# Patient Record
Sex: FEMALE | Race: WHITE
[De-identification: names, ages, dates, MRNs, and addresses within clinical notes are randomized per-mention and may not be internally consistent; named-entity substitution may affect disease eponyms.]

---

## 2018-05-26 ENCOUNTER — HOSPITAL ENCOUNTER (OUTPATIENT)
Dept: HOSPITAL 75 - ER | Age: 53
Discharge: HOME | End: 2018-05-26
Attending: SURGERY
Payer: SELF-PAY

## 2018-05-26 VITALS — WEIGHT: 120 LBS | HEIGHT: 61 IN | BODY MASS INDEX: 22.66 KG/M2

## 2018-05-26 VITALS — SYSTOLIC BLOOD PRESSURE: 111 MMHG | DIASTOLIC BLOOD PRESSURE: 74 MMHG

## 2018-05-26 VITALS — DIASTOLIC BLOOD PRESSURE: 74 MMHG | SYSTOLIC BLOOD PRESSURE: 111 MMHG

## 2018-05-26 DIAGNOSIS — K81.0: Primary | ICD-10-CM

## 2018-05-26 DIAGNOSIS — E87.2: ICD-10-CM

## 2018-05-26 DIAGNOSIS — E11.65: ICD-10-CM

## 2018-05-26 DIAGNOSIS — R19.7: ICD-10-CM

## 2018-05-26 DIAGNOSIS — Z79.899: ICD-10-CM

## 2018-05-26 DIAGNOSIS — Z79.84: ICD-10-CM

## 2018-05-26 LAB
ALBUMIN SERPL-MCNC: 4.7 GM/DL (ref 3.2–4.5)
ALP SERPL-CCNC: 88 U/L (ref 40–136)
ALT SERPL-CCNC: 33 U/L (ref 0–55)
APTT PPP: YELLOW S
BACTERIA #/AREA URNS HPF: (no result) /HPF
BARBITURATES UR QL: NEGATIVE
BASOPHILS # BLD AUTO: 0 10^3/UL (ref 0–0.1)
BASOPHILS NFR BLD AUTO: 0 % (ref 0–10)
BASOPHILS NFR BLD MANUAL: 0 %
BENZODIAZ UR QL SCN: POSITIVE
BILIRUB SERPL-MCNC: 1.2 MG/DL (ref 0.1–1)
BILIRUB UR QL STRIP: NEGATIVE
BUN/CREAT SERPL: 20
BUN/CREAT SERPL: 20
CALCIUM SERPL-MCNC: 8.2 MG/DL (ref 8.5–10.1)
CALCIUM SERPL-MCNC: 9.8 MG/DL (ref 8.5–10.1)
CHLORIDE SERPL-SCNC: 102 MMOL/L (ref 98–107)
CHLORIDE SERPL-SCNC: 107 MMOL/L (ref 98–107)
CO2 SERPL-SCNC: 18 MMOL/L (ref 21–32)
CO2 SERPL-SCNC: 18 MMOL/L (ref 21–32)
COCAINE UR QL: NEGATIVE
CREAT SERPL-MCNC: 0.71 MG/DL (ref 0.6–1.3)
CREAT SERPL-MCNC: 0.82 MG/DL (ref 0.6–1.3)
EOSINOPHIL # BLD AUTO: 0 10^3/UL (ref 0–0.3)
EOSINOPHIL NFR BLD AUTO: 0 % (ref 0–10)
EOSINOPHIL NFR BLD MANUAL: 0 %
ERYTHROCYTE [DISTWIDTH] IN BLOOD BY AUTOMATED COUNT: 13.4 % (ref 10–14.5)
FIBRINOGEN PPP-MCNC: CLEAR MG/DL
GFR SERPLBLD BASED ON 1.73 SQ M-ARVRAT: > 60 ML/MIN
GFR SERPLBLD BASED ON 1.73 SQ M-ARVRAT: > 60 ML/MIN
GLUCOSE SERPL-MCNC: 275 MG/DL (ref 70–105)
GLUCOSE SERPL-MCNC: 325 MG/DL (ref 70–105)
GLUCOSE UR STRIP-MCNC: (no result) MG/DL
HCT VFR BLD CALC: 40 % (ref 35–52)
HGB BLD-MCNC: 14.2 G/DL (ref 11.5–16)
KETONES UR QL STRIP: (no result)
LEUKOCYTE ESTERASE UR QL STRIP: NEGATIVE
LIPASE SERPL-CCNC: 61 U/L (ref 8–78)
LYMPHOCYTES # BLD AUTO: 0.4 X 10^3 (ref 1–4)
LYMPHOCYTES NFR BLD AUTO: 7 % (ref 12–44)
MAGNESIUM SERPL-MCNC: 1.9 MG/DL (ref 1.8–2.4)
MANUAL DIFFERENTIAL PERFORMED BLD QL: YES
MCH RBC QN AUTO: 29 PG (ref 25–34)
MCHC RBC AUTO-ENTMCNC: 35 G/DL (ref 32–36)
MCV RBC AUTO: 82 FL (ref 80–99)
METHADONE UR QL SCN: NEGATIVE
METHAMPHETAMINE SCREEN URINE S: NEGATIVE
MONOCYTES # BLD AUTO: 0.2 X 10^3 (ref 0–1)
MONOCYTES NFR BLD AUTO: 3 % (ref 0–12)
MONOCYTES NFR BLD: 2 %
NEUTROPHILS # BLD AUTO: 4.6 X 10^3 (ref 1.8–7.8)
NEUTROPHILS NFR BLD AUTO: 90 % (ref 42–75)
NEUTS BAND NFR BLD MANUAL: 80 %
NEUTS BAND NFR BLD: 12 %
NITRITE UR QL STRIP: NEGATIVE
OPIATES UR QL SCN: NEGATIVE
OXYCODONE UR QL: NEGATIVE
PH UR STRIP: 6 [PH] (ref 5–9)
PLATELET # BLD: 131 10^3/UL (ref 130–400)
PMV BLD AUTO: 10.1 FL (ref 7.4–10.4)
POTASSIUM SERPL-SCNC: 3.7 MMOL/L (ref 3.6–5)
POTASSIUM SERPL-SCNC: 4 MMOL/L (ref 3.6–5)
PROPOXYPH UR QL: NEGATIVE
PROT SERPL-MCNC: 8.6 GM/DL (ref 6.4–8.2)
PROT UR QL STRIP: (no result)
RBC # BLD AUTO: 4.93 10^6/UL (ref 4.35–5.85)
RBC #/AREA URNS HPF: (no result) /HPF
RBC MORPH BLD: NORMAL
SODIUM SERPL-SCNC: 136 MMOL/L (ref 135–145)
SODIUM SERPL-SCNC: 138 MMOL/L (ref 135–145)
SP GR UR STRIP: 1.01 (ref 1.02–1.02)
SQUAMOUS #/AREA URNS HPF: (no result) /HPF
TRICYCLICS UR QL SCN: NEGATIVE
TRIGL SERPL-MCNC: 315 MG/DL (ref ?–150)
UROBILINOGEN UR-MCNC: NORMAL MG/DL
VARIANT LYMPHS NFR BLD MANUAL: 6 %
WBC # BLD AUTO: 5.1 10^3/UL (ref 4.3–11)
WBC #/AREA URNS HPF: (no result) /HPF

## 2018-05-26 PROCEDURE — 96375 TX/PRO/DX INJ NEW DRUG ADDON: CPT

## 2018-05-26 PROCEDURE — 86850 RBC ANTIBODY SCREEN: CPT

## 2018-05-26 PROCEDURE — 85007 BL SMEAR W/DIFF WBC COUNT: CPT

## 2018-05-26 PROCEDURE — 87081 CULTURE SCREEN ONLY: CPT

## 2018-05-26 PROCEDURE — 80048 BASIC METABOLIC PNL TOTAL CA: CPT

## 2018-05-26 PROCEDURE — 84478 ASSAY OF TRIGLYCERIDES: CPT

## 2018-05-26 PROCEDURE — 83690 ASSAY OF LIPASE: CPT

## 2018-05-26 PROCEDURE — 85027 COMPLETE CBC AUTOMATED: CPT

## 2018-05-26 PROCEDURE — 96361 HYDRATE IV INFUSION ADD-ON: CPT

## 2018-05-26 PROCEDURE — 86920 COMPATIBILITY TEST SPIN: CPT

## 2018-05-26 PROCEDURE — 84703 CHORIONIC GONADOTROPIN ASSAY: CPT

## 2018-05-26 PROCEDURE — 88304 TISSUE EXAM BY PATHOLOGIST: CPT

## 2018-05-26 PROCEDURE — 36415 COLL VENOUS BLD VENIPUNCTURE: CPT

## 2018-05-26 PROCEDURE — 83735 ASSAY OF MAGNESIUM: CPT

## 2018-05-26 PROCEDURE — 82962 GLUCOSE BLOOD TEST: CPT

## 2018-05-26 PROCEDURE — 76705 ECHO EXAM OF ABDOMEN: CPT

## 2018-05-26 PROCEDURE — 80306 DRUG TEST PRSMV INSTRMNT: CPT

## 2018-05-26 PROCEDURE — 74019 RADEX ABDOMEN 2 VIEWS: CPT

## 2018-05-26 PROCEDURE — 86901 BLOOD TYPING SEROLOGIC RH(D): CPT

## 2018-05-26 PROCEDURE — 86900 BLOOD TYPING SEROLOGIC ABO: CPT

## 2018-05-26 PROCEDURE — 96376 TX/PRO/DX INJ SAME DRUG ADON: CPT

## 2018-05-26 PROCEDURE — 96374 THER/PROPH/DIAG INJ IV PUSH: CPT

## 2018-05-26 PROCEDURE — 86141 C-REACTIVE PROTEIN HS: CPT

## 2018-05-26 PROCEDURE — 80053 COMPREHEN METABOLIC PANEL: CPT

## 2018-05-26 PROCEDURE — 81000 URINALYSIS NONAUTO W/SCOPE: CPT

## 2018-05-26 RX ADMIN — SODIUM CHLORIDE SCH MLS/HR: 900 INJECTION, SOLUTION INTRAVENOUS at 05:39

## 2018-05-26 RX ADMIN — SODIUM CHLORIDE SCH MLS/HR: 900 INJECTION, SOLUTION INTRAVENOUS at 04:38

## 2018-05-26 RX ADMIN — MORPHINE SULFATE PRN MG: 10 INJECTION, SOLUTION INTRAMUSCULAR; INTRAVENOUS at 12:40

## 2018-05-26 RX ADMIN — SODIUM CHLORIDE, SODIUM LACTATE, POTASSIUM CHLORIDE, AND CALCIUM CHLORIDE PRN MLS/HR: 600; 310; 30; 20 INJECTION, SOLUTION INTRAVENOUS at 11:35

## 2018-05-26 RX ADMIN — SODIUM CHLORIDE, SODIUM LACTATE, POTASSIUM CHLORIDE, AND CALCIUM CHLORIDE PRN MLS/HR: 600; 310; 30; 20 INJECTION, SOLUTION INTRAVENOUS at 10:43

## 2018-05-26 RX ADMIN — MORPHINE SULFATE PRN MG: 10 INJECTION, SOLUTION INTRAMUSCULAR; INTRAVENOUS at 12:45

## 2018-05-26 NOTE — PROGRESS NOTE-POST OPERATIVE
Post-Operative Progess Note


Surgeon (s)/Assistant (s)


Surgeon


REYNOLD MCNALLY DO


Assistant:  none





Pre-Operative Diagnosis


Acute Rodríguez without stones, Hyperglycemia, Intractable nausea and vomiting





Post-Operative Diagnosis





Same





Procedure & Operative Findings


Date of Procedure


5/26/18


Procedure Performed/Findings


Lap rodríguez with IOC


Anesthesia Type


GET





Estimated Blood Loss


Estimated blood loss (mL):  150ml





Specimens/Packing


Specimens Removed


GB and contents











REYNOLD MCNALLY DO May 26, 2018 12:13

## 2018-05-26 NOTE — DIAGNOSTIC IMAGING REPORT
PROCEDURE: US Gallbladder.



TECHNIQUE: Multiple real-time grayscale images were obtained over

the right upper quadrant in various projections.



INDICATION: Abdominal pain, pancreatitis.



COMPARISON: There are no prior studies available for comparison.



FINDINGS:  

There is no evidence for cholelithiasis but the gallbladder wall

is markedly thickened and irregular. The wall measures

approximately 10 MM (normal 3 mm or less).  



There may be a trace amount of pericholecystic fluid present, as

well. These findings do suggest acute cholecystitis. If further

imaging is desired, then a nuclear medicine hepatobiliary scan

would be recommended.



The common bile duct was not visualized due to bowel gas. The

pancreas was also obscured by bowel gas.



The liver does not appear enlarged. There is no focal mass

involving the liver and the biliary tree is not abnormally

distended. 



The right kidney is unremarkable. 



The aorta was not well visualized. 



IMPRESSION:

1. The gallbladder wall is thickened and has a lobulated

appearance. There is also a trace amount of pericholecystic fluid

present. There is no evidence for cholelithiasis but the

appearance of the gallbladder does suggest acute cholecystitis.

Recommendations as above.



2. There is no acute abnormality of the right upper quadrant

noted otherwise.



Dictated by: 



  Dictated on workstation # [id number]

## 2018-05-26 NOTE — CONSULTATION
History of Present Illness


History of Present Illness


Patient Consulted On(lawanda/time)


18


 09:32


Time Seen by Provider:  09:06


History of Present Illness


Surgery asked to consult regarding Acute Cholecystitis.





HPI per ED:  The patient presents to the ER by private conveyance with a chief 

complaint she is having epigastric abdominal pain consistent with her history 

of pancreatitis. Her epigastric pain radiates to her back. It's sharp, 

constant. She has poorly controlled diabetes. She does not take her blood 

sugars anymore because stresses are out. She had an A1c about for 5 months ago 

of 10. She has a history of hypertriglyceridemia above 1200 several years ago 

when she had a pancreatitis attack. She does not take fish oil her statins due 

to adverse effects. She denies alcohol intake. She does not use insulin for her 

diabetes just Amaryl, metformin. She says she's been taking her medications as 

prescribed. She denies any trauma, dysuria, chills but she thinks she might of 

been having some fever the last several days. The pain started last night about 

2130 after eating dinner. She says the last time she had pancreatitis she had 

to be put in the hospital for 8 days on Dilaudid.








When I saw pt she still was having nausea, minimal abdominal pain (maybe 2-3 

out of 10) with no real radiation of the pain.  Pt does not remember why she 

had pancreatitis in , denies gallstones or alcohol intake.  Nothing is 

helping the vomiting.





Allergies and Home Medications


Allergies


Coded Allergies:  


     sulfamethoxazole (Verified  Allergy, Intermediate, HIVES, 16)


     trimethoprim (Verified  Allergy, Intermediate, HIVES, 16)





Home Medications


Prednisone 10 Mg Tab, 10 MG PO BID


   Prescribed by: JOSE ALFARO on 16 0149





Patient Home Medication List


Home Medication List Reviewed:  Yes





Past Medical-Social-Family Hx


Patient Social History


Alcohol Use:  Denies Use


Recreational Drug Use:  No


Smoking Status:  Never a Smoker


2nd Hand Smoke Exposure:  No


Recent Foreign Travel:  No


Contact w/Someone Who Travel:  No


Recent Infectious Disease Expo:  No


Recent Hopitalizations:  No





Seasonal Allergies


Seasonal Allergies:  No





Surgeries


History of Surgeries:  Yes


Surgeries:  Appendectomy





Respiratory


History of Respiratory Disorde:  No





Cardiovascular


History of Cardiac Disorders:  No





Neurological


History of Neurological Disord:  No





Reproductive System


Pregnant:  No


Hx Reproductive Disorders:  No





Genitourinary


History of Genitourinary Disor:  No





Gastrointestinal


History of Gastrointestinal Di:  Yes


Gastrointestinal Disorders:  Pancreatitis





Musculoskeletal


History of Musculoskeletal Dis:  No





Endocrine


History of Endocrine Disorders:  Yes


Endocrine Disorders:  Diabetes, Non-Insulin dep





HEENT


History of HEENT Disorders:  No





Cancer


History of Cancer:  No





Psychosocial


History of Psychiatric Problem:  Yes


Behavioral Health Disorders:  Anxiety





Integumentary


History of Skin or Integumenta:  No





Blood Transfusions


History of Blood Disorders:  No





Family Medical History


Significant Family History:  Cancer (Grandfather  of stomach CA), Diabetes (

Mother, Father and Siblings), Hypertension (Parents and sister)





Review of Systems-General


Constitutional:  No chills, No diaphoresis; dizziness, malaise, weakness


EENTM:  No blurred vision, No double vision, No mouth pain, No mouth swelling, 

No nose congestion, No throat swelling


Respiratory:  No cough, No dyspnea on exertion, No hemoptysis


Cardiovascular:  No chest pain, No edema, No palpitations


Gastrointestinal:  abdominal pain; No jaundice; nausea, vomiting


Genitourinary:  No dysuria, No frequency, No hematuria


Musculoskeletal:  No back pain, No joint pain, No joint swelling, No muscle 

stiffness


Skin:  No change in color, No change in hair/nails, No rash


Psychiatric/Neurological:  Anxiety, Depressed; Denies Headache, Denies Seizure, 

Denies Tingling


Other


pt denies any abnormal bleeding or bruising.





Physical Exam-General Problems


Physical Exam


Vital Signs





Vital Signs - First Documented








 18





 04:20


 


Temp 97.7


 


Pulse 117


 


Resp 20


 


B/P (MAP) 149/100 (116)


 


Pulse Ox 98


 


O2 Delivery Room Air





Capillary Refill : Less Than 3 Seconds


General Appearance:  WD/WN, moderate distress


Eyes:  Bilateral Eye PERRL, Bilateral Eye EOMI


HEENT:  pharynx normal; No scleral icterus (R), No scleral icterus (L), No pale 

conjunctivae (R), No pale conjunctivae (L)


Neck:  non-tender, full range of motion, supple, normal inspection


Respiratory:  chest non-tender, lungs clear, normal breath sounds, no 

respiratory distress, no accessory muscle use


Cardiovascular:  regular rate, rhythm, no edema, systolic murmur


Gastrointestinal:  soft, no organomegaly, no pulsatile mass, tenderness (

epigastric and RUQ)


Rectal:  deferred


Back:  no CVA tenderness, no vertebral tenderness


Extremities:  normal range of motion, non-tender, normal inspection, no pedal 

edema, no calf tenderness


Neurologic/Psychiatric:  CNs II-XII nml as tested, no motor/sensory deficits, 

alert, normal mood/affect, oriented x 3


Skin:  normal color, warm/dry


Lymphatic:  no adenopathy (neck, axilla or groin)





Data Review


Labs


Laboratory Tests


18 04:30: 


White Blood Count 5.1, Red Blood Count 4.93, Hemoglobin 14.2, Hematocrit 40, 

Mean Corpuscular Volume 82, Mean Corpuscular Hemoglobin 29, Mean Corpuscular 

Hemoglobin Concent 35, Red Cell Distribution Width 13.4, Platelet Count 131, 

Mean Platelet Volume 10.1, Neutrophils (%) (Auto) 90H, Lymphocytes (%) (Auto) 7L

, Monocytes (%) (Auto) 3, Eosinophils (%) (Auto) 0, Basophils (%) (Auto) 0, 

Neutrophils # (Auto) 4.6, Lymphocytes # (Auto) 0.4L, Monocytes # (Auto) 0.2, 

Eosinophils # (Auto) 0.0, Basophils # (Auto) 0.0, Neutrophils % (Manual) 80, 

Lymphocytes % (Manual) 6, Monocytes % (Manual) 2, Eosinophils % (Manual) 0, 

Basophils % (Manual) 0, Band Neutrophils 12, Blood Morphology Comment NORMAL, 

Sodium Level 136, Potassium Level 4.0, Chloride Level 102, Carbon Dioxide Level 

18L, Anion Gap 16H, Blood Urea Nitrogen 16, Creatinine 0.82, Estimat Glomerular 

Filtration Rate > 60, BUN/Creatinine Ratio 20, Glucose Level 325H, Calcium 

Level 9.8, Magnesium Level 1.9, Total Bilirubin 1.2H, Aspartate Amino Transf (

AST/SGOT) 25, Alanine Aminotransferase (ALT/SGPT) 33, Alkaline Phosphatase 88, C

-Reactive Protein High Sensitivity 1.45H, Total Protein 8.6H, Albumin 4.7H, 

Triglycerides Level 315H, Lipase 61


18 04:34: Glucometer 308H


18 05:10: 


Urine Color YELLOW, Urine Clarity CLEAR, Urine pH 6, Urine Specific Gravity 

1.010L, Urine Protein 1+H, Urine Glucose (UA) 4+H, Urine Ketones 3+H, Urine 

Nitrite NEGATIVE, Urine Bilirubin NEGATIVE, Urine Urobilinogen NORMAL, Urine 

Leukocyte Esterase NEGATIVE, Urine RBC (Auto) NEGATIVE, Urine RBC RARE, Urine 

WBC NONE, Urine Squamous Epithelial Cells 2-5, Urine Crystals NONE, Urine 

Bacteria TRACE, Urine Casts NONE, Urine Mucus NEGATIVE, Urine Culture Indicated 

NO, Urine Opiates Screen NEGATIVE, Urine Oxycodone Screen NEGATIVE, Urine 

Methadone Screen NEGATIVE, Urine Propoxyphene Screen NEGATIVE, Urine 

Barbiturates Screen NEGATIVE, Ur Tricyclic Antidepressants Screen NEGATIVE, 

Urine Phencyclidine Screen NEGATIVE, Urine Amphetamines Screen NEGATIVE, Urine 

Methamphetamines Screen NEGATIVE, Urine Benzodiazepines Screen POSITIVEH, Urine 

Cocaine Screen NEGATIVE, Urine Cannabinoids Screen POSITIVEH


18 06:27: Glucometer 276H


18 06:38: 


Sodium Level 138, Potassium Level 3.7, Chloride Level 107, Carbon Dioxide Level 

18L, Anion Gap 13, Blood Urea Nitrogen 14, Creatinine 0.71, Estimat Glomerular 

Filtration Rate > 60, BUN/Creatinine Ratio 20, Glucose Level 275H, Calcium 

Level 8.2L





Assessment/Plan


Intractable Nausea and Vomiting


Acute Cholecystitis without Cholelithiasis


Hyperglycemia


DM II





Pt will be taken to the OR for laparoscopic cholecystectomy, possible 

cholangiogram, possible open.  She has been given IVF and her glucose is 

trending down.  US was 


read as acute cholecystitis; wall 1cm thick with pericholecystic fluid.  Pt WBC 

in normal.  Discussed risks and complications not limited to pain, bleeding, 

infection, scar


and damage to bowel or bile ducts with need for further procedure.  I also 

discussed the fact that she may not have resolution of her symptoms; however, 

the gallbladder


appears to be the most likely cause of her problems.  She does not appear to 

have pancreatitis at this time, although could develop after surgery.  All 

questions answered


to her and her 's satisfaction.











REYNOLD MCNALLY DO May 26, 2018 09:33

## 2018-05-26 NOTE — OPERATIVE REPORT
DATE OF SERVICE:  05/26/2018



PREOPERATIVE DIAGNOSES:

1.  Acute cholecystitis without stones.

2.  Hyperglycemia.

3.  Intractable nausea and vomiting.



POSTOPERATIVE DIAGNOSES:

1.  Acute cholecystitis without stones.

2.  Hyperglycemia.

3.  Intractable nausea and vomiting.



PROCEDURE:

Laparoscopic cholecystectomy, intraoperative cholangiogram.



SURGEON:

Adarsh Smith DO.



FIRST ASSIST:

None.



ANESTHESIA:

General endotracheal tube.



SPECIMEN:

Gallbladder and contents.



BLOOD LOSS:

Approximately 150 mL.



FLUIDS:

Per anesthesia.



POSTOPERATIVE CONDITION:

Stable.



INDICATION FOR PROCEDURE:

The patient is a 52-year-old female who came in with intractable nausea and

vomiting, found to have acute cholecystitis.  No stones seen diagnosed with

ultrasound, thickened gallbladder wall and pericholecystic fluid.



FINDINGS:

The patient had some pericholecystic fluid and edema, but there was some

thickened fat around the gallbladder and is very vascular had a lot of large

veins coming from the liver into the gallbladder and there was an intrahepatic

gallbladder.



PROCEDURE NOTE:

After informed consent was obtained, the patient was brought to the operating

room, placed on the operating table in supine position.  She was sterilely

prepped and draped in normal fashion.  Local lidocaine was used to infiltrate

the skin above the umbilicus.  Made incision with #11 blade, carried down to

skin into subcutaneous tissue, then deepened down to subcutaneous tissue by

electrocautery down to the fascia.  Fascia was then incised with electrocautery

and bluntly entered the abdomen, swept a finger around, placed 0 Vicryl

figure-of-eight suture, then placed 11 mm trocar port under direct

visualization.  Created pneumoperitoneum and placed 3 more ports in normal

fashion using local lidocaine, 11 blade for stab incision and the VersaStep

system, all done under direct visualization, one subxiphoid and 2 in the right

upper quadrant.  The patient then placed slightly reverse Trendelenburg and

rotated left.  Able to visualize the gallbladder looked thickened and slightly

edematous.  There is also very intrahepatic and there was lot of vasculature to

it.  Able to grasp the gallbladder at the fundus and taken in superior direction

and then pushed away the duodenum which was slightly adhered down to the distal

portion of the gallbladder with some blunt dissection also use a little bit of a

Bovie electrocautery.  Once this was pushed away then able to grasp down

Marvel's pouch and pulled in the inferolateral direction and start dissecting

out cystic duct and cystic artery.  I was able to get around the cystic duct and

placed 1 clip distally and then placed one distally two proximally on the cystic

artery.  Cut the cystic duct FDC through Metzenbaum scissors and shot a

cholangiogram.  Good spillage of dye down the cystic duct into the common bile

duct and down into the small intestine, appeared to be and possibly some stones

during this cholangiogram, but then they kind of went away, so I believe this

may have just been some air bubbles.  The contrast also went up into common

hepatic and then right and left hepatics did not delineate the pancreatic duct. 

Removed the cholangiogram catheter, placed 2 clips proximally on the cystic duct

then cut the cystic duct and cystic artery with Metzenbaum scissors.  Started

removing the gallbladder from bed of liver with L-hook cautery it was very

intrahepatic and there was lot of big vasculature veins to it, kept bleeding

because of vessels feeding the gallbladder even from the top.  I ran into a

couple large veins that bled a little bit controlled these with clips as well as

Bovie electrocautery.  It was very thickened tissue fat surrounding the

gallbladder.  This was very difficult to get down.  Finally, able to get the

gallbladder completely off the bed of liver placed a bag in the abdomen, placed

the gallbladder in the bag and then removed this through a supraumbilical

incision.  Placed the port back in the abdomen, copiously irrigated with normal

saline, suctioned this out.  Hemostasis was obtained in the bed of liver with

the Bovie electrocautery.  There was no bleeding at the end of the case. 

Pictures taken to confirm this suctioned out all the fluid looked around, no

other obvious pathology placed the patient supine and suctioned out the fluid

and then the pneumoperitoneum, removed all ports under direct visualization and

then closed the supraumbilical incision, closing the fascia with 0 Vicryl suture

previously placed.  Copiously irrigated all incisions with normal saline,

closing 3 small 5 mm incision with a single interrupted 4-0 undyed Monocryl

subcuticular stitch, closed supraumbilical incision with 3 interrupted 4-0

undyed Monocryl subcuticular stitches.  Area was cleaned and dried and Dermabond

placed as well as Band-Aids.  The patient then transferred to recovery room in

stable condition.  Sponge, instrument and needle count correct at the end of the

case.





Job ID: [id number]

DocumentID: [id number]

Dictated Date:  05/26/2018 15:21:37

Transcription Date: 05/26/2018 17:36:30

Dictated By: ADARSH SMITH DO

## 2018-05-26 NOTE — ANESTHESIA-GENERAL POST-OP
General


Patient Condition


Mental Status/LOC:  Same as Preop


Cardiovascular:  Satisfactory


Nausea/Vomiting:  Absent


Respiratory:  Satisfactory


Pain:  Controlled


Complications:  Absent





Post Op Complications


Complications


None





Follow Up Care/Instructions


Patient Instructions


None needed.





Anesthesia/Patient Condition


Patient Condition


Patient is doing well, no complaints, stable vital signs, no apparent adverse 

anesthesia problems.   


No complications reported per nursing.











FRIEDA RAMAN CRNA May 26, 2018 12:32

## 2018-05-26 NOTE — DIAGNOSTIC IMAGING REPORT
EXAM: FLUOROSCOPY



INDICATION: GB REMOVAL IN OR



COMPARISON: Gallbladder ultrasound 05/26/2018.



FINDINGS: 74 images were acquired during removal of the

gallbladder and intraoperative cholangiogram. Common bile duct is

normal in caliber with no filling defects identified. Contrast

reflux into the intrahepatic biliary ducts which appear

unremarkable.



IMPRESSION: Normal intraoperative cholangiogram after

cholecystectomy.



Fluoroscopy time 13.2 seconds. 7.74 mGy.



Dictated by: 



  Dictated on workstation # [id number]

## 2018-05-26 NOTE — DISCHARGE INST-SURGICAL
Discharge Inst-Surgical


Depart Medication/Instructions


New, Converted or Re-Newed RX:  RX Given to Pt/Family


Patient Instructions


Follow up Appt:


Make appointment for 1-2 weeks.





Instructions:


No lifting greater than 10 pounds.


No strenuous activity. 


May shower in 24 hours, no tub bath or soaking.


Use incentive spirometer at home as directed.


No Smoking





Skin/Wound Care:


You need to leave the Dermabond on over incision it will fall off on its own. 





Symptoms to Report:


Appetite Changes, Extremity Discoloration, Numbness/Tingling, Swelling Increased

, Bleeding Excessive, Eyesight Changes, Pain Increased, Urine Color Change, 

Constipation(Persistent), Fever over 101 degree F, Pain/Pressure in chest, 

Urinating Difficulty, Cough Up/Vomit Blood, Heart Beat Irreg/Pounding, Pain/

Pressure in jaw, Vaginal Bleeding Increase, Cramps in feet or legs, 

Lightheadedness, Pain/Pressure in shoulder, Diarrhea(Persistent), Memory 

Changes Suddenly, Questions/Concerns, Weight gain consecutive days, Dizziness/

Fainting, Nausea/Vomiting, Shortness of Breath, Weight gain over 2 pounds.





If eyes or skin turn yellow notify physician.








If questions or concerns contact your physician 


Or seek help at emergency department.





Activity


Activity Instructions:  Avoid Pulling & Pushing, Avoid Stress to Incision


Driving Instructions:  No Driving/Refer to 





Diet


Discharge Diet:  Avoid Fatty Foods, Low Fat/Low Cholesterol


Diet After 24 Hours:  Clear Liquid if Nauseous


If Any Problems/Questions/Issu:  Contact Your Physician, Go to Emergency Room, 

Go to Quick Care





Skin/Wound Care


Infection Signs and Symptoms:  Increased Redness, Foul Odor of Wound, Increased 

Drainage, Skin Itchy or Has a Rash, Increased Swelling, Temperature Above 101  F


Wound Care Comment:  


heating pad to neck and shoulder tonight for pain


Bathing Instructions:  Shower


Stitches/Staples/Dermabond Dis:  Dermabond


Ice Pack:  Ice On and Off Site











REYNOLD MCNALLY DO May 26, 2018 12:16

## 2019-01-30 ENCOUNTER — HOSPITAL ENCOUNTER (EMERGENCY)
Dept: HOSPITAL 75 - ER | Age: 54
Discharge: HOME | End: 2019-01-30
Payer: SELF-PAY

## 2019-01-30 VITALS — WEIGHT: 135 LBS | HEIGHT: 62 IN | BODY MASS INDEX: 24.84 KG/M2

## 2019-01-30 VITALS — DIASTOLIC BLOOD PRESSURE: 83 MMHG | SYSTOLIC BLOOD PRESSURE: 118 MMHG

## 2019-01-30 DIAGNOSIS — Z87.19: ICD-10-CM

## 2019-01-30 DIAGNOSIS — Z88.2: ICD-10-CM

## 2019-01-30 DIAGNOSIS — Z79.84: ICD-10-CM

## 2019-01-30 DIAGNOSIS — Z82.49: ICD-10-CM

## 2019-01-30 DIAGNOSIS — E11.9: ICD-10-CM

## 2019-01-30 DIAGNOSIS — F12.10: ICD-10-CM

## 2019-01-30 DIAGNOSIS — F41.9: ICD-10-CM

## 2019-01-30 DIAGNOSIS — Z90.49: ICD-10-CM

## 2019-01-30 DIAGNOSIS — Z79.52: ICD-10-CM

## 2019-01-30 DIAGNOSIS — N39.0: Primary | ICD-10-CM

## 2019-01-30 DIAGNOSIS — Z88.8: ICD-10-CM

## 2019-01-30 LAB
ALBUMIN SERPL-MCNC: 5.1 GM/DL (ref 3.2–4.5)
ALP SERPL-CCNC: 100 U/L (ref 40–136)
ALT SERPL-CCNC: 31 U/L (ref 0–55)
APTT PPP: YELLOW S
BACTERIA #/AREA URNS HPF: (no result) /HPF
BARBITURATES UR QL: NEGATIVE
BASOPHILS # BLD AUTO: 0 10^3/UL (ref 0–0.1)
BASOPHILS NFR BLD AUTO: 0 % (ref 0–10)
BASOPHILS NFR BLD MANUAL: 1 %
BENZODIAZ UR QL SCN: NEGATIVE
BILIRUB SERPL-MCNC: 0.8 MG/DL (ref 0.1–1)
BILIRUB UR QL STRIP: NEGATIVE
BUN/CREAT SERPL: 24
CALCIUM SERPL-MCNC: 9.8 MG/DL (ref 8.5–10.1)
CHLORIDE SERPL-SCNC: 99 MMOL/L (ref 98–107)
CO2 SERPL-SCNC: 21 MMOL/L (ref 21–32)
COCAINE UR QL: NEGATIVE
CREAT SERPL-MCNC: 0.85 MG/DL (ref 0.6–1.3)
EOSINOPHIL # BLD AUTO: 0 10^3/UL (ref 0–0.3)
EOSINOPHIL NFR BLD AUTO: 0 % (ref 0–10)
EOSINOPHIL NFR BLD MANUAL: 0 %
ERYTHROCYTE [DISTWIDTH] IN BLOOD BY AUTOMATED COUNT: 13.7 % (ref 10–14.5)
FIBRINOGEN PPP-MCNC: CLEAR MG/DL
GFR SERPLBLD BASED ON 1.73 SQ M-ARVRAT: > 60 ML/MIN
GLUCOSE SERPL-MCNC: 226 MG/DL (ref 70–105)
GLUCOSE UR STRIP-MCNC: (no result) MG/DL
HCT VFR BLD CALC: 41 % (ref 35–52)
HGB BLD-MCNC: 13.9 G/DL (ref 11.5–16)
KETONES UR QL STRIP: (no result)
LEUKOCYTE ESTERASE UR QL STRIP: (no result)
LIPASE SERPL-CCNC: 38 U/L (ref 8–78)
LYMPHOCYTES # BLD AUTO: 0.2 X 10^3 (ref 1–4)
LYMPHOCYTES NFR BLD AUTO: 3 % (ref 12–44)
MANUAL DIFFERENTIAL PERFORMED BLD QL: YES
MCH RBC QN AUTO: 28 PG (ref 25–34)
MCHC RBC AUTO-ENTMCNC: 34 G/DL (ref 32–36)
MCV RBC AUTO: 83 FL (ref 80–99)
METHADONE UR QL SCN: NEGATIVE
METHAMPHETAMINE SCREEN URINE S: NEGATIVE
MONOCYTES # BLD AUTO: 0.2 X 10^3 (ref 0–1)
MONOCYTES NFR BLD AUTO: 3 % (ref 0–12)
MONOCYTES NFR BLD: 0 %
NEUTROPHILS # BLD AUTO: 6 X 10^3 (ref 1.8–7.8)
NEUTROPHILS NFR BLD AUTO: 94 % (ref 42–75)
NEUTS BAND NFR BLD MANUAL: 78 %
NEUTS BAND NFR BLD: 17 %
NITRITE UR QL STRIP: NEGATIVE
OPIATES UR QL SCN: NEGATIVE
OXYCODONE UR QL: NEGATIVE
PH UR STRIP: 5 [PH] (ref 5–9)
PLATELET # BLD: 115 10^3/UL (ref 130–400)
PMV BLD AUTO: 10.2 FL (ref 7.4–10.4)
POTASSIUM SERPL-SCNC: 3.6 MMOL/L (ref 3.6–5)
PROPOXYPH UR QL: NEGATIVE
PROT SERPL-MCNC: 9.2 GM/DL (ref 6.4–8.2)
PROT UR QL STRIP: (no result)
RBC #/AREA URNS HPF: (no result) /HPF
RBC MORPH BLD: NORMAL
SODIUM SERPL-SCNC: 135 MMOL/L (ref 135–145)
SP GR UR STRIP: 1.02 (ref 1.02–1.02)
TRICYCLICS UR QL SCN: NEGATIVE
UROBILINOGEN UR-MCNC: NORMAL MG/DL
VARIANT LYMPHS NFR BLD MANUAL: 4 %
WBC # BLD AUTO: 6.4 10^3/UL (ref 4.3–11)
WBC #/AREA URNS HPF: (no result) /HPF

## 2019-01-30 PROCEDURE — 36415 COLL VENOUS BLD VENIPUNCTURE: CPT

## 2019-01-30 PROCEDURE — 87088 URINE BACTERIA CULTURE: CPT

## 2019-01-30 PROCEDURE — 82010 KETONE BODYS QUAN: CPT

## 2019-01-30 PROCEDURE — 82962 GLUCOSE BLOOD TEST: CPT

## 2019-01-30 PROCEDURE — 81000 URINALYSIS NONAUTO W/SCOPE: CPT

## 2019-01-30 PROCEDURE — 80053 COMPREHEN METABOLIC PANEL: CPT

## 2019-01-30 PROCEDURE — 80306 DRUG TEST PRSMV INSTRMNT: CPT

## 2019-01-30 PROCEDURE — 74177 CT ABD & PELVIS W/CONTRAST: CPT

## 2019-01-30 PROCEDURE — 83690 ASSAY OF LIPASE: CPT

## 2019-01-30 PROCEDURE — 85007 BL SMEAR W/DIFF WBC COUNT: CPT

## 2019-01-30 PROCEDURE — 85027 COMPLETE CBC AUTOMATED: CPT

## 2019-01-30 PROCEDURE — 87077 CULTURE AEROBIC IDENTIFY: CPT

## 2019-01-30 NOTE — XMS REPORT
[facility]

 Created on: 2018



GalvezLibra levy

External Reference #: [id number]

: 1965

Sex: Female



Demographics







 Address  [address]  [address]

 

 Preferred Language  Unknown

 

 Marital Status  Unknown

 

 Islam Affiliation  Unknown

 

 Race  Unknown

 

 Ethnic Group  Unknown





Author







 Author  ANIKET MAYFIELD

 

 [facility]

 

 Address  [address] [place]  [address]



 

 Phone  [phone]







Care Team Providers







 Care Team Member Name  Role  Phone

 

 ANIKET MAYFIELD  Unavailable  [phone]







PROBLEMS







 Type  Condition  ICD9-CM Code  JBH94-OU Code  Onset Dates  Condition Status  
SNOMED Code

 

 Problem  Hypertriglyceridemia     E78.1     Active  988942948

 

 Problem  Status post cholecystectomy     Z90.49     Active  730383484

 

 Problem  Anxiety disorder, unspecified     F41.9     Active  804329731

 

 Problem  Acquired hypothyroidism     E03.9     Active  898129725

 

 Problem  Diabetes     E11.9     Active  [id number]







ALLERGIES







 Substance  Reaction  Event Type  Date  Status

 

 Sulfamethoxazole-Trimethoprim  anaphylaxis  Drug Allergy  10 Andrei, 2018  Active







ENCOUNTERS







 Encounter  Location  Date  Diagnosis

 

 [place] [id number] [place] [address] 41477-
9805     

 

 [facility]  [id number] [place] [address] 02279-
3117     

 

 [place] [id number] [place] [address] 11514-
1583    Ketoacidosis E87.2 ; Status post cholecystectomy Z90.49 ; 
Diabetes E11.9 ; Acquired hypothyroidism E03.9 ; Hypertriglyceridemia E78.1 and 
Vaginal yeast infection B37.3

 

 [facility]  [id number] [place] [address] 74275-
5215  15 May, 2018   

 

 [facility]  [id number] [place] [address] 94482-
1953     

 

 [facility]  [id number] [place] [address] 72453-
3220  20 Mar, 2018   

 

 [facility]  [id number] [place] [address] 07418-
7025     

 

 [place] [id number] [place] [address] 13419-
4581     

 

 [facility]  [id number] [place] [address] 18820-
8853  10 Andrei, 2018  Diabetes E11.9 and Drug-induced acute pancreatitis with 
uninfected necrosis K85.31

 

 [facility]  [id number] [place] [address] 65728-
0510  27 Dec, 2017   

 

 [facility]  [address] [address]-
0581     

 

 [facility]  [id number] [place] [address] [phone]
[phone]  10 Nov, 2017  Crushing injury of right foot, initial encounter S97.81XA

 

 [facility]  [id number] [place] [address] 64604-
2923  27 Sep, 2017   

 

 [facility]  [address] [address]-
5116  21 Sep, 2017   

 

 [facility]  [id number] [place] [address] [phone]
[phone]  19 Sep, 2017  Acute non-recurrent pansinusitis J01.40

 

 [facility]  [id number] [place] [address] 05275-
6834  19 Sep, 2017   

 

 [facility]  [id number] [place] [address] 77901-
7437  04 Aug, 2017   

 

 [facility]  [id number] [place] [address] 21892-
4947     

 

 [facility]  [id number] [place] [address] 86997-
6204  26 May, 2017   

 

 [facility]  [id number] [place] [address] 20854-
8469  08 May, 2017  Diabetes E11.9 ; Anxiety disorder, unspecified F41.9 and 
Acquired hypothyroidism E03.9

 

 [facility]  [id number] [place] [address] 52667-
3916  01 May, 2017   

 

 [facility]  [address], [place] [address]-
4328     

 

 [facility]  [address] [place] [address] 20629-
3176     

 

 [facility]  [address] [place] [address] 50383-
9436  06 Mar, 2017   

 

 [facility]  [id number] [place] [address] 62354-
7918     

 

 [facility]  [address] [place] [address] 01480-
1198     

 

 [facility]  [id number] [place] [address] 45387-
4902     

 

 [facility]  [id number] [place] [address] 35202-
4098     

 

 [facility]  [id number] [place] [address] 74559-
8254    Acute non-recurrent maxillary sinusitis J01.00

 

 [facility]  [address] [place] [address] 65422-
1625     

 

 [facility]  [address] [address]753-
6944     

 

 [facility]  [id number] [place] [address] 45707-
2243  12 Dec, 2016   

 

 [facility]  [address] [place] [address] 46500-
4473  15 Nov, 2016   

 

 [facility]  [id number] [place] [address] 356239-
4731  12 Oct, 2016  Diabetes E11.9

 

 [facility]  [address], [place] [address]-
0579  28 Sep, 2016  Pelvic pain R10.2 ; Leukocytosis, unspecified D72.829 ; 
Constipation, unspecified constipation type K59.00 and History of pancreatitis 
Z87.19

 

 [facility]  [address], [place] [address]-
2126  22 Sep, 2016   

 

 [facility]  [address] [place] [address] 34892-
4166  14 Sep, 2016  Diabetes E11.9

 

 [facility]  [address] [place] [address] 21603
2546  13 Sep, 2016   

 

 [facility]  [id number] [place] [address] 17684
2541  09 Sep, 2016  Vaginal yeast infection B37.3

 

 [facility]  [id number] [place] [address] 99035-
0556  08 Sep, 2016   

 

 [facility]  [id number] [place] [address] 65732-
0116  30 Aug, 2016  Diabetes E11.9

 

 [facility]  [id number] [place] [address] 24617-
6185  25 Aug, 2016  Anxiety disorder, unspecified F41.9

 

 [facility]  [address] [place] [address] 37662-
5897  17 Aug, 2016  Vaginal yeast infection B37.3

 

 [facility]  [address], [place] [address]-
7852  17 Aug, 2016   

 

 [facility]  [address], [place] [address]-
7608    Anxiety disorder, unspecified F41.9

 

 [facility]  [address], [place] [address]-
1385    Vaginal yeast infection B37.3

 

 [facility]  [address], [place] [address]-
7016    Anxiety disorder, unspecified F41.9

 

 [facility]  [address], [place] [address]-
4806    Anxiety disorder, unspecified F41.9

 

 [facility]  [address], [place] [address]-
1700  06 May, 2016  Anxiety disorder, unspecified F41.9

 

 [facility]  [address] [address]-
2845  04 May, 2016  Diabetes E11.9

 

 [facility]  [address] [address]-
1385    Anxiety disorder, unspecified F41.9

 

 [facility]  [address], [place] [address]-
1872     

 

 [facility]  [id number] [place] [address] 78154-
6843  25 Mar, 2016  Vaginal yeast infection B37.3

 

 [facility]  [address], [place] [address]-
7368  15 Mar, 2016   

 

 [facility]  [id number] [place] [address] 19701-
5546     

 

 [facility]  [address] [place] [address] 48754-
8702     

 

 [facility]  [address], [place] [address]-
6901     

 

 [facility]  [address], [place] [address]-
6411  15 Andrei, 2016   

 

 [facility]  [address], [place] [address]-
4950  22 Dec, 2015   

 

 [facility]  [address], [place] [address]-
2938    Diabetes E11.9 ; Acquired hypothyroidism E03.9 ; 
Hyperlipidemia, unspecified hyperlipidemia E78.5 and Anxiety F41.9

 

 [facility]  [address], [place] [address]-
6137     

 

 [facility]  [address] [place] [address] 30911-
7089  27 Oct, 2015   

 

 [facility]  [address], [place] [address]
2542  29 Sep, 2015   

 

 [facility]  [address], [place] [address]-
2546  01 Sep, 2015   

 

 [facility]  [address], [place] [address]-
2546  04 Aug, 2015   

 

 [facility]  [id number] [place] [address] 90556-
2546  15 Jul, 2015   

 

 [facility]  [address], [place] [address]-
2254    DUB (dysfunctional uterine bleeding) 626.8 and Pap test, as 
part of routine gynecological examination V76.2

 

 [facility]  [address], [place] [address]-
1456     

 

 [facility]  [address] [place] [address] 01604-
2546     

 

 [facility]  [address], [place] [address]-
3643     

 

 [facility]  [address], [place] [address]-
7006     

 

 [facility]  [address], [place] [address]-
2986  15 Eagle, 2015  Diabetes 250.00

 

 [facility]  [address], [place] [address]-
2546     

 

 [facility]  [id number] [address]
2543  19 May, 2015   

 

 [facility]  [address] [place] [address] 88913
2546  13 May, 2015  Diabetes 250.00

 

 [facility]  [id number], [place] [address]-
2546  12 May, 2015   

 

 [facility]  [address], [place] [address]-
1546  07 May, 2015   

 

 [facility] [place] [facility]  [address] [id number] [place] [address]-
9885  07 May, 2015   

 

 [facility] [place] [facility]  [address] [id number] [place] [address]-
9637  05 May, 2015   

 

 [facility] [place] [facility]  [address] [id number] [place] [address]-
2868  01 May, 2015   

 

 [facility] [place] [facility]  [address] [id number] [place] [address]-
1405     

 

 [facility] [place] [facility]  [address] [id number] [place] [address]-
1073     

 

 [facility] [place] [facility]  [address] [id number] [place] [address]-
8946     

 

 [facility] [place] [facility]  [address] [id number] [place] [address]-
8055  17 Mar, 2015   

 

 [facility] [place] [facility]  [address] [id number] [place] [address]-
8212  17 Mar, 2015   

 

 [facility] [place] [facility]  [address] [id number] [place] [address]-
9035  17 Mar, 2015   

 

 [facility] [place] [facility]  [address] [id number] [place] [address]-
6814  17 Mar, 2015   

 

 [facility] [place] [facility]  [address] [id number] [place] [address]-
8219  09 Mar, 2015   

 

 [facility] [place] [facility]  [address] [id number] [place] [address]-
8897  09 Mar, 2015   

 

 [facility] [place] [facility]  [address] [id number] [place] [address]-
1371  06 Mar, 2015   

 

 [facility] [place] [facility]  [address] [id number] [place] [address]-
8135  03 Mar, 2015   

 

 [facility] [place] [facility]  [address] [id number] [place] [address]-
0728  03 Mar, 2015   

 

 [facility] [place] [facility]  [address] [id number] [place] [address]-
8141  ,    

 

 [facility] [place] [facility]  [address] [id number] [place] [address]-
2485  ,    

 

 [facility] [place] [facility]  [address] [id number] [place] [address]-
2546  ,    

 

 [facility] [place] [facility]  [address] [id number] [place] [address]-
5976  ,    

 

 [facility] [place] [facility]  [address] [id number] [place] [address]-
0823  ,    

 

 [facility] [place] [facility]  [address] [id number] [place] [address]-
7543  ,    

 

 [facility] [place] [facility]  [address] [id number] [place] [address]-
3887  ,    

 

 [facility] [place] [facility]  [address] [id number] [place] [address]-
7031     

 

 [facility] [place] [facility]  [address] [id number] [place] [address]-
8493     

 

 [facility] [place] [facility]  [address] [id number] [place] [address]-
7064     

 

 [facility] [place] [facility]  [address] [id number] [place] [address]-
1885     

 

 [facility] [place] [facility]  [address] [id number] [place] [address]-
5393     

 

 [facility] [place] [facility]  [address] [id number] [place] [address]-
5562     

 

 [facility] [place] [facility]  [address] [id number] [place] [address]-
2022     

 

 [facility] [place] [facility]  [address] [id number] [place] [address]-
6821     

 

 [facility] [place] [facility]  [address] [id number] [address]-
8234     

 

 [facility] [place] [facility]  [address] [id number] [place] [address]-
7261     

 

 [facility] [place] [facility]  [address] [id number] [place]-
4199     

 

 [facility] [place] [facility]  [address] [id number] [place] [address]-
3327     

 

 [facility] [place] [facility]  [address] [id number] [place] [address]-
3886     

 

 [facility] [place] [facility]  [address] [id number] [place] [address]-
6849  23 Dec, 2014   

 

 [facility] [place] [facility]  [address] [id number] [place] [address]-
1795  23 Dec, 2014   

 

 [facility] [place] [facility]  [address] [id number] [place] [address]-
6253  22 Dec, 2014   

 

 [facility] [place] [facility]  [address] [id number] [place] [address]-
6640  22 Dec, 2014   

 

 [facility] [place] [facility]  [address] [id number] [place] [address]-
7658  17 Dec, 2014   

 

 [facility] [place] [facility]  [address] [id number] [place] [address]-
0372  17 Dec, 2014   

 

 [facility] [place] [facility]  [address] [id number] [place] [address]-
2926  15 Dec, 2014   

 

 [facility] [place] [facility]  [address] [id number] [place] [address]-
2087  15 Dec, 2014   

 

 [facility] [place] [facility]  [address] [id number] [place] [address]-
0196  12 Dec, 2014   

 

 [facility] [place] [facility]  [address] [id number] [place] [address]-
9538  08 Dec, 2014   

 

 [facility] [place] [facility]  [address] [id number] [place] [address]-
2173  08 Dec, 2014   

 

 [facility] [place] [facility]  [address] [id number] [place] [address]-
7454  08 Dec, 2014   

 

 [facility] [place] [facility]  [address] [id number] [place] [address]-
0221  08 Dec, 2014   

 

 [facility] [place] [facility]  [address] [id number] [place] [address]-
5735     

 

 [facility] [place] [facility]  [address] [id number] [place] [address]-
5743     

 

 [facility] [place] [facility]  [address] [id number] [place] [address]-
1390     

 

 [facility] [place] [facility]  [address] [id number] [place] [address]-
7178     

 

 [facility] [place] [facility]  [address] [id number] [place] [address]-
3867  10 Nov, 2014   

 

 [facility] [place] [facility]  [address] [id number] [place] [address]-
7749  10 Nov, 2014   

 

 [facility] [place] [facility]  [address] [id number] [place] [address]-
2988  07 Oct, 2014   

 

 [facility] [place] [facility]  [address] [id number] [place] [address]-
1825  07 Oct, 2014   

 

 [facility] [place] [facility]  [address] [id number] [place] [address]-
0288  01 Oct, 2014   

 

 [facility] [place] [facility]  [address] [id number] [place] [address]-
6181  01 Oct, 2014   

 

 [facility] [place] [facility]  [address] [id number] [place] [address]-
9663  24 Sep, 2014   

 

 [facility] [place] [facility]  [address] [id number] [place] [address]-
4629  24 Sep, 2014   

 

 [facility] [place] [facility]  [address] [id number] [place] [address]-
6506  23 Sep, 2014   

 

 [facility] [place] [facility]  [address] [id number] [place] [address]-
8507  23 Sep, 2014   

 

 [facility] [place] [facility]  [address] [id number] [place] [address]-
5918  22 Sep, 2014   

 

 [facility] [place] [facility]  [address] [id number] [place] [address]-
3947  22 Sep, 2014   

 

 [facility] [place] [facility]  [address] [id number] [place] [address]-
8043  19 Aug, 2014   

 

 [facility] [place] [facility]  [address] [id number] [place] [address]-
7001  19 Aug, 2014   

 

 [facility] [place] [facility]  [address] [id number] [place] [address]-
0348     

 

 [facility] [place] [facility]  [address] [id number] [place] [address]-
9513     

 

 [facility] [place] [facility]  [address] [id number] [place] [address]-
6287  10 Eagle, 2014   

 

 [facility] [place] [facility]  [address] [id number] [place] [address]-
9821     

 

 [facility] [place] [facility]  [address] [id number] [place] [address]-
9765     

 

 [facility] [place] [facility]  [address] [id number] [place] [address]-
8548  07 May, 2014   

 

 [facility] [place] [facility]  [address] [id number] [place] [address]-
8282  07 May, 2014   

 

 [facility] [place] [facility]  [address] [id number] [place] [address]-
8027     

 

 [facility] [place] [facility]  [address] [id number] [place] [address]-
7242     

 

 [facility] [place] [facility]  [address] [id number] [place] [address]-
8504     

 

 [facility] [place] [facility]  [address] [id number] [place] [address]-
9986     

 

 [facility] [place] [facility]  [address] [id number] [place] [address]-
9036     

 

 [facility] [place] [facility]  [address] [id number] [place] [address]-
8803     

 

 [facility] [place] [facility]  [address] [id number] [place] [address]-
4319     

 

 [facility] [place] [facility]  [address] [id number] [place] [address]-
3596     

 

 [facility] [place] [facility]  [address] [id number] [place] [address]-
5485     

 

 [facility] [place] [facility]  [address] [id number] [place] [address]-
3405     

 

 [facility] [place] [facility]  [address] [id number] [place] [address]-
1928  17 Mar, 2014   

 

 [facility] [place] [facility]  [address] [id number] [place] [address]-
5697  17 Mar, 2014   

 

 [facility] [place] [facility]  [address] [id number] [place] [address]-
3724     

 

 [facility] [place] [facility]  [address] [id number] [place] [address]-
7381     

 

 [facility] [place] [facility]  [address] [id number] [place] [address]-
4859     

 

 [facility] [place] [facility]  [address] [id number] [place] [address]-
1204     

 

 [facility] [place] [facility]  [address] [id number] [place] [address]-
1287  25 Oct, 2013   

 

 [facility] [place] [facility]  [address] [id number] [place] [address]-
0323  25 Oct, 2013   

 

 [facility] [place] [facility]  [address] [id number] [place] [address]-
9964  23 Sep, 2013   

 

 [facility] [place] [facility]  [address] [id number] [place] [address]-
6749  06 Aug, 2013   

 

 [facility] [place] [facility]  [address] [id number] [place] [address]-
7563  05 Aug, 2013   

 

 [facility] [place] [facility]  [address] [id number] [place] [address]-
3582     

 

 [facility] [place] [facility]  [address] [id number] [place] [address]-
3806     

 

 [facility] [place] [facility]  [address] [id number] [place] [address]-
8944     

 

 [facility]  [address] [id number] [place] [address]-
8337  28 Dec, 2012   

 

 [facility] [place] [facility]  [address] [id number] [place] [address]-
9829  19 Dec, 2012   

 

 [facility]  [address] [id number] [place] [address]-
1670  19 Dec, 2012   

 

 [facility]  [address] [id number] [place] [address]-
4065  13 Dec, 2012   

 

 [facility] [place] [facility]  [address] [id number] [place] [address]-
0158  13 Dec, 2012   

 

 [facility] [place] [facility]  [address] [id number] [place] [address]-
3439     

 

 [facility] [place] [facility]  [address] [id number] [place] [address]-
3406     

 

 [facility] [place] [facility]  [address] [id number] [place] [address]-
6424  19 Oct, 2012   

 

 [facility] [place] [facility]  [address] [id number] [place] [address]-
7456  18 Oct, 2012   

 

 [facility] [place] [facility]  [address] [id number] [place] [address]-
1091  17 Oct, 2012   

 

 [facility] [place] [facility]  [address] [id number] [place] [address]-
9423  17 Oct, 2012   

 

 [facility] [place] [facility]  [address] [id number] [place] [address]-
3891  16 Oct, 2012   

 

 [facility] [place] [facility]  [address] [id number] [place] [address]-
1669  16 Oct, 2012   

 

 [facility] [place] [facility]  [address] [id number] [place] [address]-
1785  15 Oct, 2012   

 

 [facility] [place] [facility]  [address] [id number] [place] [address]-
7933  27 Sep, 2012   

 

 [facility] [place] [facility]  [address] [id number] [place] [address]-
2829  26 Sep, 2012   

 

 [facility] [place] [facility]  [address] [id number] [place] [address]-
4400     

 

 [facility] [place] [facility]  [address] [id number] [place] [address]-
5834     

 

 [facility] [place] [facility]  [address] [id number] [place] [address]-
2223     

 

 [facility] [place] [facility]  [address] [id number] [place] [address]-
0306     

 

 [facility] [place] [facility]  [address] [id number] [place] [address]-
9162     

 

 [facility] [place] [facility]  [address] [id number] [place] [address]-
0568     

 

 [facility] [place] [facility]  [address] [id number][place] [address]-
0709     

 

 [facility] [place] [facility]  [address] [id number], [place] [address]-
6741     

 

 [facility] [place] [facility]  [address] [id number] [place] [address]-
7114  01 Mar, 2012   

 

 [facility] [place] [facility]  [address] [id number] [place] [address]-
9827     

 

 [facility] [place] [facility]  [address] [id number] [place] [address]-
0383     

 

 [facility] [place] [facility]  [address] [id number] [place] [address]-
8906  11 2012   

 

 [facility] [facility]  [address] [id number] [place] [address]
8384  10 Andrei, 2012   

 

 [facility] [place] [facility]  [address] [id number] [place] [address]-
5396     

 

 [facility]  [address] [id number] [place] [address]-
3245  20 Dec, 2011   

 

 [facility] [place] [facility]  [address] [id number] [place] [address]-
3279  20 Dec, 2011   

 

 [facility]  [address] [id number] [place] [address]-
5506  13 Dec, 2011   

 

 [facility]  [address] [id number] [place] [address]-
6628  08 Dec, 2011   

 

 [facility]  [address] [id number] [place] [address]-
3816  08 Dec, 2011   

 

 [facility]  [address] [id number] [place] [address]-
9708  06 Dec, 2011   

 

 [facility]  [address] [id number] [place] [address]-
2251  10 Oct, 2011   

 

 [facility]  [address] [id number] [place] [address]-
2303  10 Oct, 2011   

 

 [facility]  [address] [id number] [place] [address]-
5670     

 

 [facility]  [address] [id number] [place] [address]-
6283  20 Dec, 2010   

 

 [facility] [place] [facility]  [address] [id number] [place] [address]-
6386  20 Dec, 2010   

 

 [facility] [place] [facility]  [address] [id number] [place] [address]-
2546     

 

 [facility] [place] [facility]  [address] [id number] [place] [address]-
1034     

 

 [facility]  [address] [facility] [id number], [place] [address]-
2538  15 Oct, 2009   

 

 [facility]  [address] [facility] [id number], [place] [address]-
3365  15 Oct, 2009   

 

 [facility]  [address] [facility] [id number], [place] [address]-
3539     







IMMUNIZATIONS

No Known Immunizations



SOCIAL HISTORY

Never Assessed



REASON FOR VISIT

DM, pt is wanting to talk about carbon monoxide. Gopi



PLAN OF CARE







 Activity  Details









  









 Follow Up  3 Months Reason:DM and fasting labs







VITAL SIGNS







 Height  62 in  2018-01-10

 

 Weight  144.6 lbs  2018-01-10

 

 Temperature  98.8 degrees Fahrenheit  2018-01-10

 

 Heart Rate  92 bpm  2018-01-10

 

 Respiratory Rate  20   2018-01-10

 

 BMI  26.44 kg/m2  2018-01-10

 

 Blood pressure systolic  128 mmHg  2018-01-10

 

 Blood pressure diastolic  76 mmHg  2018-01-10







MEDICATIONS







 Medication  Instructions  Dosage  Frequency  Start Date  End Date  Duration  
Status

 

 Ibuprofen 800 MG  Orally Once a day  1 tablet  24h           Not-Taking

 

 Dae Contour Test -  ANF47-M38.9 2 times a day- 3 times weekly.  test blood 
sugar             Not-Taking

 

 Glucocard Expression Monitor w/Device     as directed     08 May, 2017        
Active

 

 Potassium 99 MG  Orally Once a day  1 tablet  24h           Active

 

 Amaryl 2 MG     TAKE TWO TABLETS BY MOUTH TWICE DAILY           90 days  Active

 

 MetFORMIN HCl  mg     TAKE TWO TABLETS BY MOUTH TWICE DAILY WITH MEALS.  
MUST BE TEVA BRAND           90 days  Active

 

 Cinnamon 500 MG  Orally 2 times a day  2 capsules  12h           Active

 

 Diflucan 150 MG  Orally one time.  May repeat in 3 days if symptoms persist.  
1 tablet           4 days  Not-Taking

 

 Xanax 1 MG  Orally Once a day  1 tablet  24h       28 days  Active







RESULTS







 Name  Result  Date  Reference Range

 

 A1C (IN HOUSE)     2018-01-10   

 

 A1C IN HOUSE  10.3     4.3 - 5.6 %

 

 Previous A1c  9.6      

 

 Lot   [id number]      

 

 Exp date  10/2019      

 

 MICROALBUMIN, URINE (IN HOUSE)     2018-01-10   

 

 MICROALBUMIN  Abnormal      

 

 Lot #  [id number]      

 

 Exp date  2018      

 

 Clarity  clear      

 

 Color  yellow      

 

 ALB  10      

 

 CRE  50      

 

 A:C (IN HOUSE)        

 

 Control  +      

 

 Control         

 

 Lot #         

 

 Exp date         







PROCEDURES







 Procedure  Date Ordered  Result  Body Site

 

 MICROALBUMIN, SEMIQUANT  Andrei 10, 2018      

 

 GLYCATED HEMOGLOBIN TEST  Andrei 10, 2018      







INSTRUCTIONS





MEDICATIONS ADMINISTERED

No Known Medications



MEDICAL (GENERAL) HISTORY







 Type  Description  Date

 

 Medical History  hypothyroidism   

 

 Medical History  type II diabetes   

 

 Medical History  back pain   

 

 Medical History  hyperlipidemia   

 

 Medical History  anxiety   

 

 Medical History  mood disorder   

 

 Medical History  heart murmur   

 

 Medical History  chronic bronchitis   

 

 Medical History  [place]   

 

 Surgical History  appendectomy   

 

 Surgical History  gallbladder removal   

 

 Hospitalization History  childbirth   

 

 Hospitalization History  pancreatitis  

 

 Hospitalization History  appendectomy   

 

 Hospitalization History  Anaphylaxis to Bactrim  2016

## 2019-01-30 NOTE — XMS REPORT
[facility]

 Created on: 2017



Libra Galvez

External Reference #: [id number]

: 1965

Sex: Female



Demographics







 Address  [address]  [address]

 

 Preferred Language  Unknown

 

 Marital Status  Unknown

 

 Restoration Affiliation  Unknown

 

 Race  Unknown

 

 Ethnic Group  Unknown





Author







 Author  BULMARO OVALLE

 

 [facility]

 

 Address  [address] [place]  [address]



 

 Phone  [phone]







Care Team Providers







 Care Team Member Name  Role  Phone

 

 BULMARO OVALLE  Unavailable  [phone]







PROBLEMS







 Type  Condition  ICD9-CM Code  QYH85-BF Code  Onset Dates  Condition Status  
SNOMED Code

 

 Problem  Acquired hypothyroidism     E03.9     Active  993993576

 

 Problem  Diabetes     E11.9     Active  635275453

 

 Problem  Anxiety disorder, unspecified     F41.9     Active  [id number]







ALLERGIES

No Information



SOCIAL HISTORY

Never Assessed



PLAN OF CARE





VITAL SIGNS





MEDICATIONS







 Medication  Instructions  Dosage  Frequency  Start Date  End Date  Duration  
Status

 

 Diflucan 150 MG     1 tablet     23 Feb, 2017  5 Mar, 2017  10 day(s)  Active







RESULTS

No Results



PROCEDURES

No Known procedures



IMMUNIZATIONS

No Known Immunizations



MEDICAL (GENERAL) HISTORY







 Type  Description  Date

 

 Medical History  hypothyroidism   

 

 Medical History  type II diabetes   

 

 Medical History  back pain   

 

 Medical History  hyperlipidemia   

 

 Medical History  anxiety   

 

 Medical History  mood disorder   

 

 Medical History  heart murmur   

 

 Medical History  chronic bronchitis   

 

 Medical History  [place]   

 

 Surgical History  appendectomy   

 

 Hospitalization History  childbirth   

 

 Hospitalization History  pancreatitis  2005

 

 Hospitalization History  appendectomy   

 

 Hospitalization History  Anaphylaxis to Bactrim  2016

## 2019-01-30 NOTE — XMS REPORT
[facility]

 Created on: 2016



Libra Galvez

External Reference #: [id number]

: 1965

Sex: Female



Demographics







 Address  [address]  [address]

 

 Home Phone  [phone]

 

 Preferred Language  Unknown

 

 Marital Status  Unknown

 

 Taoist Affiliation  Unknown

 

 Race  White

 

 Ethnic Group  Not  or 





Author







 Author  ANIKET MAYFIELD

 

 Organization  [employer]

 

 Address  Unknown

 

 Phone  Unavailable







Care Team Providers







 Care Team Member Name  Role  Phone

 

 ANIKET MAYFIELD  CP  Unavailable



                                                                



Allergies

          No Known Allergies                                                   
                                     



Problems

          





 Problem Type  Condition  Code  Onset Dates  Condition Status

 

 Problem  Pure hyperglyceridemia  272.1     Active

 

 Problem  Dysuria  788.1     Active

 

 Problem  Other bursitis disorders  727.3     Active

 

 Problem  Diabetes  250.00     Active

 

 Problem  Unspecified episodic mood disorder  296.90     Active

 

 Problem  Anxiety state, unspecified  300.00     Active

 

 Problem  Lumbago  724.2     Active

 

 Problem  Other chronic nonalcoholic liver disease  571.8     Active



                                                                               
                                                                               



Medications

          





 Medication  Code System  Code  Instructions  Start Date  End Date  Status  
Dosage

 

 Amaryl  NDC  [address]-37  2 MG Orally 2 times a day PT CAN ONLY USE TIVIA 
MANUFACTURE           1 tablets in the morning and 1 in the evening



                                                                              



Results

          No Known Results                                                     
               



Summary Purpose

          [employer] Submission

## 2019-01-30 NOTE — XMS REPORT
[facility]

 Created on: 2017



Libra Galvez

External Reference #: [id number]

: 1965

Sex: Female



Demographics







 Address  [address]  [address]

 

 Preferred Language  Unknown

 

 Marital Status  Unknown

 

 Yazidi Affiliation  Unknown

 

 Race  Unknown

 

 Ethnic Group  Unknown





Author







 Author  ANIKET MAYFIELD

 

 [facility]

 

 Address  [address] [place]  [address]



 

 Phone  [phone]







Care Team Providers







 Care Team Member Name  Role  Phone

 

 ANIKET MAYFIELD  Unavailable  [phone]







PROBLEMS







 Type  Condition  ICD9-CM Code  PUD39-BA Code  Onset Dates  Condition Status  
SNOMED Code

 

 Problem  Anxiety disorder, unspecified     F41.9     Active  [id number]







ALLERGIES

Unknown Allergies



SOCIAL HISTORY

No smoking Hx information available



PLAN OF CARE





VITAL SIGNS





MEDICATIONS

Unknown Medications



RESULTS

No Results



PROCEDURES

No Known procedures



IMMUNIZATIONS

No Known Immunizations

## 2019-01-30 NOTE — XMS REPORT
[facility]

 Created on: 2016



Libra Galvez

External Reference #: [id number]

: 1965

Sex: Female



Demographics







 Address  [address]  [address]

 

 Home Phone  [phone]

 

 Preferred Language  Unknown

 

 Marital Status  Unknown

 

 Church Affiliation  Unknown

 

 Race  White

 

 Ethnic Group  Not  or 





Author







 Author  ANIKET MAYFIELD

 

 [facility]  [employer]

 

 Address  Unknown

 

 Phone  Unavailable







Care Team Providers







 Care Team Member Name  Role  Phone

 

 ANIKET MAYFIELD  CP  Unavailable



                                                                



Allergies

          No Known Allergies                                                   
                                     



Problems

          





 Problem Type  Condition  Code  Onset Dates  Condition Status

 

 Problem  Pure hyperglyceridemia  272.1     Active

 

 Problem  Dysuria  788.1     Active

 

 Problem  Other bursitis disorders  727.3     Active

 

 Problem  Diabetes  250.00     Active

 

 Problem  Unspecified episodic mood disorder  296.90     Active

 

 Problem  Anxiety state, unspecified  300.00     Active

 

 Problem  Lumbago  724.2     Active

 

 Problem  Other chronic nonalcoholic liver disease  571.8     Active



                                                                               
                                                                               



Medications

          No Known Medications                                                 
                             



Results

          No Known Results                                                     
               



Summary Purpose

          [employer] Submission

## 2019-01-30 NOTE — XMS REPORT
[facility]

 Created on: 2018



Galvez Libra

External Reference #: [id number]

: 1965

Sex: Female



Demographics







 Address  [address] [place] [address]  [address]

 

 Preferred Language  Unknown

 

 Marital Status  Unknown

 

 Scientologist Affiliation  Unknown

 

 Race  Unknown

 

 Ethnic Group  Unknown





Author







 Author  ANIKET MAYFIELD

 

 [facility]

 

 Address  [address] [place]  [address]



 

 Phone  [phone]







Care Team Providers







 Care Team Member Name  Role  Phone

 

 ANIKET MAYFIELD  Unavailable  [phone]







PROBLEMS







 Type  Condition  ICD9-CM Code  QRC39-JY Code  Onset Dates  Condition Status  
SNOMED Code

 

 Problem  Anxiety disorder, unspecified     F41.9     Active  238869234

 

 Problem  Violation of controlled substance agreement     Z91.14     Active  
[id number]

 

 Problem  Intestinal malabsorption, unspecified     K90.9     Active  66390082

 

 Problem  Acquired hypothyroidism     E03.9     Active  113847799

 

 Problem  Diabetes     E11.9     Active  523854005

 

 Problem  Hypertriglyceridemia     E78.1     Active  463102527

 

 Problem  Status post cholecystectomy     Z90.49     Active  518906892







ALLERGIES

No Information



ENCOUNTERS







 Encounter  Location  Date  Diagnosis

 

 [facility]  [address] [address]-
[address]  01 Oct, 2018   

 

 [facility]  [address] [address]-
5708  29 Aug, 2018  Yeast infection B37.9

 

 [place] [address] [address]-
7009  21 Aug, 2018   

 

 [facility]  [address] [address]-
3350    Intestinal malabsorption, unspecified K90.9 ; Diarrhea, 
unspecified R19.7 ; Diabetes E11.9 and Marijuana smoker F12.90

 

 [facility]  [address] 
[id number]    Dental examination Z01.20

 

 [facility]  [id number] [place] [address] 
[id number]  10 Jul, 2018  Dental examination Z01.20 and Dental caries K02.9

 

 [facility]  [address] [address]-
4799    Benzodiazepine use agreement exists Z02.89 ; Therapeutic 
drug monitoring Z51.81 and Violation of controlled substance agreement Z91.14

 

 [place] [address] [address]-
1170  05 2018   

 

 [place] [address] [address]-
7309    Ketoacidosis E87.2 ; Status post cholecystectomy Z90.49 ; 
Diabetes E11.9 ; Acquired hypothyroidism E03.9 ; Hypertriglyceridemia E78.1 and 
Vaginal yeast infection B37.3

 

 [place] [address] [address]-
6269  15 May, 2018   

 

 [place] [address] [address]-
4161     

 

 [place] [address] [address]-
3229  20 Mar, 2018   

 

 [facility]  [address] [address]-
8286     

 

 [place] [address] [address]-
4802     

 

 [place] [address] [address]-
7106  10 Andrei, 2018  Diabetes E11.9 and Drug-induced acute pancreatitis with 
uninfected necrosis K85.31

 

 [place] [address] [address]-
5355  27 Dec, 2017   

 

 [facility] [place]  [address] [address]-
[address]     

 

 [facility]  [address] [address]
-[address]  10 Nov, 2017  Crushing injury of right foot, initial encounter S97.81XA

 

 [place] [address] [address]-
2461  27 Sep, 2017   

 

 [facility]  [address], [place] [address]-
9669  21 Sep, 2017   

 

 [facility]  [address], [place] [address]  19 Sep, 2017  Acute non-recurrent pansinusitis J01.40

 

 [facility]  [address], [place] [address]-
6896  19 Sep, 2017   

 

 [facility]  [id number] [place] [address]-
9630  04 Aug, 2017   

 

 [facility]  [id number] [place] [address]-
2234     

 

 [facility] [place]  [id number] [place] [address]-
8414  26 May, 2017   

 

 [facility]  [id number] [place] [address]-
0917  08 May, 2017  Diabetes E11.9 ; Anxiety disorder, unspecified F41.9 and 
Acquired hypothyroidism E03.9

 

 [facility]  [address], [place] [address]-
1954  01 May, 2017   

 

 [facility]  [id number] [place] [address]-
3252     

 

 [facility]  [address], [place] [address]-
7948     

 

 [facility] [place]  [id number] [address]-
2642  06 Mar, 2017   

 

 [facility] [place]  [address], [place] [address]-
8023     

 

 [facility] [place]  [id number] [place] [address]-
5706     

 

 [facility]  [address]65[address], [place] [address]-
1421     

 

 [facility] [place]  [id number] [address]-
7828     

 

 [facility]  [id number] [place] [address]-
9242    Acute non-recurrent maxillary sinusitis J01.00

 

 [facility]  [address] [place] [address]-
1747     

 

 [facility]  [id number] [place] [address]-
1833     

 

 [facility]  [id number] [place] [address]-
3563  12 Dec, 2016   

 

 [facility] [place]  [address] [id number] [place] [address]-
6777  15 Nov, 2016   

 

 [facility]  [id number] [place] [address]-
7174  12 Oct, 2016  Diabetes E11.9

 

 [facility]  [id number] [place] [address]-
0624  28 Sep, 2016  Pelvic pain R10.2 ; Leukocytosis, unspecified D72.829 ; 
Constipation, unspecified constipation type K59.00 and History of pancreatitis 
Z87.19

 

 [facility]  [address], [place] [address]-
9141  22 Sep, 2016   

 

 [facility]  [id number] [place] [address]-
4928  14 Sep, 2016  Diabetes E11.9

 

 [facility]  [address], [place] [address]-
7943  13 Sep, 2016   

 

 [facility]  [id number] [place] [address]-
8749  09 Sep, 2016  Vaginal yeast infection B37.3

 

 [facility]  [address] [place] [address]38-
9694  08 Sep, 2016   

 

 [facility]  [id number] [place] [address]-
8323  30 Aug, 2016  Diabetes E11.9

 

 [facility]  [address], [place] [address]-
8963  25 Aug, 2016  Anxiety disorder, unspecified F41.9

 

 [facility]  [address] [place] [address] 624568-
7096  17 Aug, 2016  Vaginal yeast infection B37.3

 

 [facility]  [address] [place] [address]993-
7582  17 Aug, 2016   

 

 [facility]  [address] [place] [address]-
1021    Anxiety disorder, unspecified F41.9

 

 [facility]  [address] [id number] [place] [address]-
6543    Vaginal yeast infection B37.3

 

 [facility]  [address] [place] [address]90-
4524    Anxiety disorder, unspecified F41.9

 

 [facility]  [id number] [place] [address] 14625-
9451    Anxiety disorder, unspecified F41.9

 

 [facility]  [address] [place] [address]-
3124  06 May, 2016  Anxiety disorder, unspecified F41.9

 

 [facility]  [address], [place] [address]-
9575  04 May, 2016  Diabetes E11.9

 

 [facility]  [address] [place] [address]-
3848    Anxiety disorder, unspecified F41.9

 

 [facility]  [address] [place] [address]63-
9266     

 

 [facility]  [address] [place] [address] 042949-
1870  25 Mar, 2016  Vaginal yeast infection B37.3

 

 [facility]  [address], [place] [address]-
2876  15 Mar, 2016   

 

 [facility]  [address], [place] [address]-
9432     

 

 [facility]  [id number] [place] [address] 720245-
3358     

 

 [facility]  [id number] [place] [address] 8[address]-
3834     

 

 [facility]  [id number] [place] [address] 67[address]-
4124  15 Andrei, 2016   

 

 [facility]  [id number] [place] [address] 336458-
8835  22 Dec, 2015   

 

 [facility]  [id number] [place] [address] 785576-
6162    Diabetes E11.9 ; Acquired hypothyroidism E03.9 ; 
Hyperlipidemia, unspecified hyperlipidemia E78.5 and Anxiety F41.9

 

 [facility]  [id number] [place] [address]-
6291     

 

 [facility]  [id number] [place] [address] 149[address]-
7093  27 Oct, 2015   

 

 [facility]  [id number] [place] [address] 22339089-
4423  29 Sep, 2015   

 

 [facility]  [id number] [place] [address] 33444-
3218  01 Sep, 2015   

 

 [facility]  [id number] [place] [address] 77046-
5125  04 Aug, 2015   

 

 [facility]  [address] [place] [address] 9[address]31-
7098  15 Jul, 2015   

 

 [facility]  [id number] [place] [address]-
3509    DUB (dysfunctional uterine bleeding) 626.8 and Pap test, as 
part of routine gynecological examination V76.2

 

 [facility]  [id number] [place] [address]61-
8643     

 

 [facility]  [address] [id number] [place] [address]-
1417     

 

 [facility]  [address] [id number] [place] [address]-
3113     

 

 [facility]  [address] [id number] [place] [address]-
6165     

 

 [facility]  [address] [id number] [place] [address]-
1423  15 Eagle, 2015  Diabetes 250.00

 

 [facility]  [address] [id number] [place] [address]-
8121     

 

 [facility]  [address] [id number] [place] [address]-
6566  19 May, 2015   

 

 [facility]  [address] [id number] [place] [address]-
0505  13 May, 2015  Diabetes 250.00

 

 [facility]  [address] [id number] [place] [address]-
6299  12 May, 2015   

 

 [facility]  [address] [id number] [place] [address]-
6538  07 May, 2015   

 

 [facility]  [address] [id number] [place] [address]-
9857  07 May, 2015   

 

 [facility]  [address] [id number] [place] [address]-
6766  05 May, 2015   

 

 [facility]  [address] [id number] [address]-
8456  01 May, 2015   

 

 [facility]  [address] [id number] [place] [address]-
1646     

 

 [facility]  [address] [id number] [place] [address]-
8852     

 

 [facility]  [address] [id number] [place] [address]-
8004     

 

 [facility]  [address] [id number] [place] [address]-
1301  17 Mar,    

 

 [facility] [place] [facility]  [address] [id number] [place] [address]-
8990  17 Mar,    

 

 [facility] [place] [facility]  [address] [id number] [place] [address]-
4666  17 Mar,    

 

 [facility] [place] [facility]  [address] [id number] [place] [address]-
9396  17 Mar,    

 

 [facility] [place] [facility]  [address] [id number] [place] [address]-
4930  09 Mar,    

 

 [facility] [place] [facility]  [address] [id number] [place] [address]-
2847  09 Mar,    

 

 [facility] [place] [facility]  [address] [id number] [place] [address]-
0886  06 Mar,    

 

 [facility] [place] [facility]  [address] [id number] [place] [address]-
2953  03 Mar,    

 

 [facility] [place] [facility]  [address] [id number] [place] [address]-
4137  03 Mar,    

 

 [facility] [place] [facility]  [address] [id number] [place] [address]-
1621  ,    

 

 [facility] [place] [facility]  [address] [facility] [id number] [place] [address]-
8762  ,    

 

 [facility] [place] [facility]  [address] [id number] [place] [address]-
6076  ,    

 

 [facility] [place] [facility]  [address] [facility] [id number] [place] [address]-
2546  ,    

 

 [facility] [place] [facility]  [address] [id number] [place] [address]-
5006  ,    

 

 [facility] [place] [facility]  [address] [facility] [id number] [place] [address]-
2546  ,    

 

 [facility] [place] [facility]  [address] [facility] [id number] [place] [address]-
2546     

 

 [facility] [place] [facility]  [address] [id number] [place] [address]-
5998     

 

 [facility] [place] [facility]  [address] [id number] [place] [address]-
4806     

 

 [facility] [place] [facility]  [address] [id number] [place] [address]-
0014     

 

 [facility] [place] [facility]  [address] [id number] [place] [address]-
6687     

 

 [facility] [place] [facility]  [address] [id number] [place] [address]-
1882     

 

 [facility] [place] [facility]  [address] [id number] [place] [address]-
5382     

 

 [facility] [place] [facility]  [address] [id number] [place] [address]-
5707     

 

 [facility] [place] [facility]  [address] [id number] [place] [address]-
0907     

 

 [facility] [place] [facility]  [address] [id number] [place] [address]-
8423     

 

 [facility] [place] [facility]  [address] [id number] [place] [address]-
5523     

 

 [facility] [place] [facility]  [address] [id number] [address]-
7810     

 

 [facility] [place] [facility]  [address] [id number], [place] [address]-
0720     

 

 [facility] [place] [facility]  [address] [id number] [place] [address]-
6916     

 

 [facility] [place] [facility]  [address] [id number] [place] [address]-
0076  23 Dec, 2014   

 

 [facility] [place] [facility]  [address] [id number] [place] [address]-
7450  23 Dec, 2014   

 

 [facility] [place] [facility]  [address] [id number] [place] [address]-
6360  22 Dec, 2014   

 

 [facility] [place] [facility]  [address] [id number] [place] [address]-
1609  22 Dec, 2014   

 

 [facility] [place] [facility]  [address] [id number] [place]
9936  17 Dec, 2014   

 

 [facility] [place] [facility]  [address] [id number] [place]
1626  17 Dec, 2014   

 

 [facility] [place] [facility]  [address] [id number] [place] [address]-
4681  15 Dec, 2014   

 

 [facility] [place] [facility]  [address] [id number] [place] [address]-
4226  15 Dec, 2014   

 

 [facility] [place] [facility]  [address] [id number] [place] [address]-
0133  12 Dec, 2014   

 

 [facility] [place] [facility]  [address] [id number] [place] [address]-
7779  08 Dec, 2014   

 

 [facility] [place] [facility]  [address] [id number] [place] [address]-
6449  08 Dec, 2014   

 

 [facility] [place] [facility]  [address] [id number] [place] [address]-
9226  08 Dec, 2014   

 

 [facility] [place] [facility]  [address] [facility] [id number] [place] [address]-
7596  08 Dec, 2014   

 

 [facility] [place] [facility]  [address] [id number] [place] [address]-
6293     

 

 [facility] [place] [facility]  [address] [id number] [place] [address]-
7587     

 

 [facility] [place] [facility]  [address] [id number] [place] [address]-
3509     

 

 [facility] [place] [facility]  [address] [id number] [place] [address]-
9963  13 2014   

 

 [facility] [place] [facility]  [address] [id number] [place]
5732  10 Nov, 2014   

 

 [facility] [place] [facility]  [address] [id number] [place] [address]-
3448  10 Nov, 2014   

 

 [facility] [place] [facility]  [address] [id number] [place] [address]-
6013  07 Oct, 2014   

 

 [facility] [place] [facility]  [address] [id number] [place]-
6379  07 Oct, 2014   

 

 [facility] [place] [facility]  [address] [id number] [place] [address]-
9605  01 Oct, 2014   

 

 [facility] [place] [facility]  [address] [id number] [place] [address]-
0503  01 Oct, 2014   

 

 [facility] [place] [facility]  [address] [id number] [place] [address]-
8872  24 Sep, 2014   

 

 [facility] [place] [facility]  [address] [id number] [place] [address]-
4687  24 Sep, 2014   

 

 [facility] [place] [facility]  [address] [id number] [place] [address]-
9852  23 Sep, 2014   

 

 [facility] [place] [facility]  [address] [id number] [place] [address]-
9572  23 Sep, 2014   

 

 [facility] [place] [facility]  [address] [id number] [place] [address]-
3870  22 Sep, 2014   

 

 [facility] [place] [facility]  [address] [id number] [place] [address]-
3084  22 Sep, 2014   

 

 [facility] [place] [facility]  [address] [id number] [place] [address]-
9191  19 Aug, 2014   

 

 [facility] [place] [facility]  [address] [id number] [place] [address]-
3003  19 Aug, 2014   

 

 [facility] [place] [facility]  [address] [id number] [place] [address]-
6004     

 

 [facility] [place] [facility]  [address] [id number] [place] [address]-
1337     

 

 [facility] [place] [facility]  [address] [id number] [place] [address]-
6898  10 Eagle, 2014   

 

 [facility] [place] [facility]  [address] [id number] [place] [address]-
0828     

 

 [facility] [place] [facility]  [address] [id number] [place]-
2455     

 

 [facility] [place] [facility]  [address] [id number] [place] [address]-
9561  07 May, 2014   

 

 [facility] [place] [facility]  [address] [id number] [place] [address]-
6934  07 May, 2014   

 

 [facility] [place] [facility]  [address] [id number] [place] [address]-
3076     

 

 [facility] [place] [facility]  [address] [id number] [place] [address]-
3091     

 

 [facility] [place] [facility]  [address] [id number] [place] [address]-
8557     

 

 [facility] [place] [facility]  [address] [id number] [place] [address]-
0081     

 

 [facility] [place] [facility]  [address] [id number] [place] [address]-
5683     

 

 [facility] [place] [facility]  [address] [id number] [place] [address]-
5608     

 

 [facility] [place] [facility]  [address] [id number] [place] [address]-
9013     

 

 [facility] [place] [facility]  [address] [id number] [place] [address]-
6839     

 

 [facility] [place] [facility]  [address] [id number] [place] [address]-
4395     

 

 [facility] [place] [facility]  [address] [id number] [place] [address]-
2048     

 

 [facility] [place] [facility]  [address] [id number] [place] [address]-
1461  17 Mar, 2014   

 

 [facility] [place] [facility]  [address] [id number] [place] [address]-
2546  17 Mar, 2014   

 

 [facility]  [address] [id number] [place] [address]-
6206     

 

 [facility] [place] [facility]  [address] [id number] [place] [address]-
2546     

 

 [facility]  [address] [id number] [place] [address]-
2546     

 

 [facility] [place] [facility]  [address] [id number] [place] [address]-
2546     

 

 [facility] [place] [facility]  [address] [id number] [place] [address]-
7546  25 Oct, 2013   

 

 [facility] [place] [facility]  [address] [id number] [place] [address]-
5256  25 Oct, 2013   

 

 [facility] [place] [facility]  [address] [id number] [place] [address]-
2546  23 Sep, 2013   

 

 [facility]  [address] [id number] [place] [address]-
2546  06 Aug, 2013   

 

 [facility] [place] [facility]  [address] [id number] [place] [address]-
2546  05 Aug, 2013   

 

 [facility]  [address] [id number] [place] [address]-
2546     

 

 [facility] [place] [facility]  [address] [id number] [place] [address]-
2546     

 

 [facility] [place] [facility]  [address] [id number] [place] [address]-
2546     

 

 [facility] [place] [facility]  [address] [id number] [place] [address]-
2546  28 Dec, 2012   

 

 [facility] [place] [facility]  [address] [id number] [place] [address]-
2546  19 Dec, 2012   

 

 [facility] [place] [facility]  [address] [id number] [place] [address]-
3335  19 Dec, 2012   

 

 [facility] [place] [facility]  [address] [id number] [place] [address]-
4012  13 Dec, 2012   

 

 [facility] [place] [facility]  [address] [id number] [place] [address]-
6689  13 Dec, 2012   

 

 [facility] [place] [facility]  [address] [id number] [place] [address]-
67[address]     

 

 [facility] [place] [facility]  [address] [id number] [place] [address]-
4200     

 

 [facility] [place] [facility]  [address] [id number] [place] [address]-
3222  19 Oct, 2012   

 

 [facility] [place] [facility]  [address] [id number] [place] [address]-
5152  18 Oct, 2012   

 

 [facility] [place] [facility]  [address] [id number] [place] [address]-
5752  17 Oct, 2012   

 

 [facility] [place] [facility]  [address] [id number] [place] [address]-
5956  17 Oct, 2012   

 

 [facility] [place] [facility]  [address] [id number] [place] [address]-
8540  16 Oct, 2012   

 

 [facility] [place] [facility]  [address] [facility] [id number][place] [address]-
4097  16 Oct, 2012   

 

 [facility] [place] [facility]  [address] [id number], [place] [address]-
8560  15 Oct, 2012   

 

 [facility] [place] [facility]  [address] [id number], [place] [address]-
4309  27 Sep, 2012   

 

 [facility] [place] [facility]  [address] [id number] [place] [address]
2960  26 Sep, 2012   

 

 [facility] [place] [facility]  [address] [id number][place] [address]-
7042     

 

 [facility] [place] [facility]  [address] [facility] [id number] [place] [address]-
2017     

 

 [facility] [place] [facility]  [address] [id number] [place] [address]-
0267     

 

 [facility] [place] [facility]  [address] [id number] [place] [address]-
3252     

 

 [facility] [place] [facility]  [address] [id number] [place] [address]-
1956     

 

 [facility] [place] [facility]  [address] [id number] [place] [address]-
8066     

 

 [facility] [place] [facility]  [address] [id number] [place] [address]-
7505     

 

 [facility] [place] [facility]  [address] [id number] [place] [address]-
7021     

 

 [facility] [place] [facility]  [address] [address] [id number] [place] [address]-
6956  01 Mar, 2012   

 

 [facility] [place] [facility]  [address] [id number] [place] [address]-
1187     

 

 [facility] [place] [facility]  [address] [id number] [place] [address]-
7967     

 

 [facility] [place] [facility]  [address] [id number] [place] [address]-
0482     

 

 [facility] [place] [facility]  [address] [id number] [place] [address]-
3531  10 Andrei, 2012   

 

 [facility] [place] [facility]  [address] [id number] [place] [address]-
6097     

 

 [facility] [place] [facility]  [address] [id number] [place] [address]-
7132  20 Dec, 2011   

 

 [facility] [place] [facility]  [address] [id number] [place] [address]-
1222  20 Dec, 2011   

 

 [facility] [place] [facility]  [address] [id number] [place] [address]-
5656  13 Dec, 2011   

 

 [facility] [place] [facility]  [address] [id number] [place] [address]-
7474  08 Dec, 2011   

 

 [facility]  [id number], [place] [address]-
5989  08 Dec, 2011   

 

 [facility]  [id number], [place] [address]-
6006  06 Dec, 2011   

 

 [facility]  [id number], [place] [address]-
8941  10 Oct, 2011   

 

 [facility]  [id number] [place] [address]-
1923  10 Oct, 2011   

 

 [facility]  [id number], [place] [address]-
9740     

 

 [facility]  [id number] [place] [address]-
5501  20 Dec, 2010   

 

 [facility]  [id number], [place] [address]-
4528  20 Dec, 2010   

 

 [facility]  [id number] [place] [address]-
8556     

 

 [facility]  [id number] [address]-
7456     

 

 [facility]  [id number], [place] [address]-
3320  15 Oct, 2009   

 

 [facility]  [id number], [place] [address]-
7711  15 Oct, 2009   

 

 [facility]  [id number], [place] [address]-
9934     







IMMUNIZATIONS

No Known Immunizations



SOCIAL HISTORY

Never Assessed



REASON FOR VISIT

Rx Request



PLAN OF CARE





VITAL SIGNS





MEDICATIONS







 Medication  Instructions  Dosage  Frequency  Start Date  End Date  Duration  
Status

 

 Diflucan 100 mg  Orally on day #1 and then 1 tab daily for 9 more days  2 
tablet     30 Aug, 2018  9 Sep, 2018  10 day(s)  Active







RESULTS

No Results



PROCEDURES

No Known procedures



INSTRUCTIONS





MEDICATIONS ADMINISTERED

No Known Medications



MEDICAL (GENERAL) HISTORY







 Type  Description  Date

 

 Medical History  hypothyroidism   

 

 Medical History  type II diabetes   

 

 Medical History  back pain   

 

 Medical History  hyperlipidemia   

 

 Medical History  anxiety   

 

 Medical History  mood disorder   

 

 Medical History  heart murmur   

 

 Medical History  chronic bronchitis   

 

 Medical History  [place]   

 

 Surgical History  appendectomy   

 

 Surgical History  gallbladder removal   

 

 Hospitalization History  childbirth   

 

 Hospitalization History  pancreatitis  2005

 

 Hospitalization History  appendectomy   

 

 Hospitalization History  Anaphylaxis to Bactrim  2016

## 2019-01-30 NOTE — XMS REPORT
[facility]

 Created on: 2017



Libra Galvez

External Reference #: [id number]

: 1965

Sex: Female



Demographics







 Address  [address]  [address]

 

 Preferred Language  Unknown

 

 Marital Status  Unknown

 

 [employer] Affiliation  Unknown

 

 Race  Unknown

 

 Ethnic Group  Unknown





Author







 Author  ANIKET MAYFIELD

 

 Organization  [facility]

 

 Address  [address] [place]  [address]



 

 Phone  [phone]







Care Team Providers







 Care Team Member Name  Role  Phone

 

 ANIKET MAYFIELD  Unavailable  [phone]







PROBLEMS







 Type  Condition  ICD9-CM Code  YUO43-IY Code  Onset Dates  Condition Status  
SNOMED Code

 

 Problem  Acquired hypothyroidism     E03.9     Active  [id number]

 

 Problem  Diabetes     E11.9     Active  [id number]

 

 Problem  Anxiety disorder, unspecified     F41.9     Active  [id number]







ALLERGIES

No Information



SOCIAL HISTORY

Never Assessed



PLAN OF CARE





VITAL SIGNS





MEDICATIONS







 Medication  Instructions  Dosage  Frequency  Start Date  End Date  Duration  
Status

 

 Diflucan 150 MG  Orally one time  1 tablet     23 Feb, 2017  3 Mar, 2017  03 
days  Active







RESULTS

No Results



PROCEDURES

No Known procedures



IMMUNIZATIONS

No Known Immunizations



MEDICAL (GENERAL) HISTORY







 Type  Description  Date

 

 Medical History  hypothyroidism   

 

 Medical History  type II diabetes   

 

 Medical History  back pain   

 

 Medical History  hyperlipidemia   

 

 Medical History  anxiety   

 

 Medical History  mood disorder   

 

 Medical History  heart murmur   

 

 Medical History  chronic bronchitis   

 

 Medical History  [place]   

 

 Surgical History  appendectomy   

 

 Hospitalization History  childbirth   

 

 Hospitalization History  pancreatitis  2005

 

 Hospitalization History  appendectomy   

 

 Hospitalization History  Anaphylaxis to Bactrim

## 2019-01-30 NOTE — XMS REPORT
[facility]

 Created on: 2018



Libra Galvez

External Reference #: [id number]

: 1965

Sex: Female



Demographics







 Address  [address]  [address]

 

 Preferred Language  Unknown

 

 Marital Status  Unknown

 

 Rastafarian Affiliation  Unknown

 

 Race  Unknown

 

 Ethnic Group  Unknown





Author







 Author  NATASHA GARCIA

 

 [facility] WALK IN [facility]

 

 Address  [address] [place]  [address]



 

 Phone  [phone]







Care Team Providers







 Care Team Member Name  Role  Phone

 

 NATASHA GARCIA  Unavailable  [phone]







PROBLEMS







 Type  Condition  ICD9-CM Code  GLO11-RD Code  Onset Dates  Condition Status  
SNOMED Code

 

 Problem  Acquired hypothyroidism     E03.9     Active  378863626

 

 Problem  Diabetes     E11.9     Active  236130881

 

 Problem  Anxiety disorder, unspecified     F41.9     Active  247509204







ALLERGIES







 Substance  Reaction  Event Type  Date  Status

 

 Sulfamethoxazole-Trimethoprim  anaphylaxis  Drug Allergy  19 Sep, 2017  Active







ENCOUNTERS







 Encounter  Location  Date  Diagnosis

 

 [facility]  [id number] [place] [address] 76367-
3390     

 

 [facility]  [id number] [place] [address] 40484-
8973  20 Mar, 2018   

 

 [facility]  [id number] [place] [address] 71652-
3918     

 

 [facility]  [id number] [place] [address] 94154-
0215     

 

 [facility]  [address] [place] [address] 40559-
1134  10 Andrei, 2018  Diabetes E11.9 and Drug-induced acute pancreatitis with 
uninfected necrosis K85.31

 

 [facility]  [id number] [place] [address] 25675-
6955  27 Dec, 2017   

 

 [facility]  [id number] [place] [address] 19516-
5118     

 

 [facility] WALK IN [facility]  [address] [place] [address] 24659
-0226  10 2017  Crushing injury of right foot, initial encounter S97.81XA

 

 [facility]  [address] [address]-
7996  27 Sep, 2017   

 

 [facility]  [address] [address]-
6403  21 Sep, 2017   

 

 [facility]  [address], [place] [address]
-2754  19 Sep, 2017  Acute non-recurrent pansinusitis J01.40

 

 [facility]  [id number] [place] [address] 68719-
5549  19 Sep, 2017   

 

 [facility]  [address] [place] [address] 95282-
3623  04 Aug, 2017   

 

 [facility]  [id number] [place] [address] 49039-
8731     

 

 [facility]  [id number] [place] [address] 30115-
7203  26 May, 2017   

 

 [facility]  [id number] [place] [address] 65709-
9788  08 May, 2017  Diabetes E11.9 ; Anxiety disorder, unspecified F41.9 and 
Acquired hypothyroidism E03.9

 

 [facility]  [address] [address]-
9402  01 May, 2017   

 

 [facility]  [address] [address]-
8797     

 

 [facility]  [address] [address]-
2654     

 

 [facility]  [address] [address]-
7287  06 Mar, 2017   

 

 [facility]  [id number] [place] [address] 97276-
9579     

 

 [facility]  [address], [place] [address]-
0281     

 

 [facility]  [id number] [place] [address] 01146-
4246     

 

 [facility]  [address], [place] [address]-
5666     

 

 [facility]  [id number] [place] [address] 08468-
5760    Acute non-recurrent maxillary sinusitis J01.00

 

 [facility]  [id number] [place] [address] 55168-
4418     

 

 [facility]  [id number] [place] [address] 70229-
9234     

 

 [facility]  [id number] [place] [address] 30959-
6539  12 Dec, 2016   

 

 [facility]  [id number] [place] [address] 98034-
3507  15 Nov, 2016   

 

 [facility]  [id number] [place] [address] 17547-
3523  12 Oct, 2016  Diabetes E11.9

 

 [facility]  [id number] [place] [address] 50351-
6277  28 Sep, 2016  Pelvic pain R10.2 ; Leukocytosis, unspecified D72.829 ; 
Constipation, unspecified constipation type K59.00 and History of pancreatitis 
Z87.19

 

 [facility]  [address], [place] [address]-
1312  22 Sep, 2016   

 

 [facility]  [id number] [place] [address] 17029-
9607  14 Sep, 2016  Diabetes E11.9

 

 [facility]  [address] [address]-
7081  13 Sep, 2016   

 

 [facility]  [id number] [place] [address] 39968-
6612  09 Sep, 2016  Vaginal yeast infection B37.3

 

 [facility]  [address] [address]-
1358  08 Sep, 2016   

 

 [facility]  [address] [address]-
9964  30 Aug, 2016  Diabetes E11.9

 

 [facility]  [address] [address]-
9932  25 Aug, 2016  Anxiety disorder, unspecified F41.9

 

 [facility]  [id number] [address]-
9517  17 Aug, 2016  Vaginal yeast infection B37.3

 

 [facility]  [address] [address]-
2377  17 Aug, 2016   

 

 [facility]  [id number] [address]-
4452    Anxiety disorder, unspecified F41.9

 

 [facility]  [address], [place] [address]-
6222    Vaginal yeast infection B37.3

 

 [facility]  [address] [place] [address] 00574-
9057    Anxiety disorder, unspecified F41.9

 

 [facility]  [address] [address]-
6283    Anxiety disorder, unspecified F41.9

 

 [facility]  [address] [address]-
3708  06 May, 2016  Anxiety disorder, unspecified F41.9

 

 [facility]  [address] [address]-
5705  04 May, 2016  Diabetes E11.9

 

 [facility]  [id number] [address]-
8532    Anxiety disorder, unspecified F41.9

 

 [facility]  [address] [address]-
1413     

 

 [facility]  [id number] [address]-
3184  25 Mar, 2016  Vaginal yeast infection B37.3

 

 [facility]  [address] [address]-
9618  15 Mar, 2016   

 

 [facility]  [id number] [place] [address] 94474-
2979     

 

 [facility]  [id number] [place] [address] 79831-
5798     

 

 [facility]  [id number] [place] [address] 14071-
3962     

 

 [facility]  [id number] [place] [address] 24137-
3949  15 Andrei, 2016   

 

 [facility]  [id number] [place] [address] 83396-
6385  22 Dec, 2015   

 

 [facility]  [id number] [place] [address] 82437-
3074    Diabetes E11.9 ; Acquired hypothyroidism E03.9 ; 
Hyperlipidemia, unspecified hyperlipidemia E78.5 and Anxiety F41.9

 

 [facility]  [id number] [place] [address] 89544-
5442     

 

 [facility]  [id number] [place] [address] 90700-
7652  27 Oct, 2015   

 

 [facility]  [id number] [place] [address] 21236-
1589  29 Sep, 2015   

 

 [facility]  [id number] [place] [address] 37016-
8003  01 Sep, 2015   

 

 [facility]  [id number] [place] [address] 18774-
3083  04 Aug, 2015   

 

 [facility]  [id number] [place] [address] 34019-
3466  15 Jul, 2015   

 

 [facility]  [id number] [place] [address] 404001-
5966    DUB (dysfunctional uterine bleeding) 626.8 and Pap test, as 
part of routine gynecological examination V76.2

 

 [facility]  [address] [address] [address]-
6277     

 

 [facility]  [address] [id number] [place] [address]-
2191     

 

 [facility]  [address] [id number] [place] [address]-
3996     

 

 [facility]  [address] [id number] [place] [address]-
2726     

 

 [facility]  [address] [id number] [place] [address]-
8571  15 Eagle, 2015  Diabetes 250.00

 

 [facility]  [address] [id number] [place] [address]-
2776     

 

 [facility]  [address] [facility] [id number] [place] [address]-
2972  19 May, 2015   

 

 [facility]  [address] [facility] [id number] [place] [address]-
7736  13 May, 2015  Diabetes 250.00

 

 [facility]  [address] [id number] [place] [address]-
7104  12 May, 2015   

 

 [facility]  [address] [facility] [id number] [place] [address]-
2688  07 May, 2015   

 

 [facility]  [address] [facility] [id number] [place] [address]-
8786  07 May, 2015   

 

 [facility]  [address] [facility] [id number] [place] [address]-
0346  05 May, 2015   

 

 [facility] beds [facility]  [address] [id number] [place] [address]-
8104  01 May, 2015   

 

 [facility] 234 beds at the [facility]  [address] [id number] [place] [address]-
8896     

 

 [facility] 234 beds at the [facility]  [address] [id number] [place] [address]-
4896  14 2015   

 

 [facility] 234 beds at the [facility]  [address] [id number] [place] [address]-
5993     

 

 [facility] [place] [facility]  [address] [id number] [place] [address]-
5809  17 Mar, 2015   

 

 [facility] [place] [facility]  [address] [id number] [place] [address]-
9176  17 Mar, 2015   

 

 [facility] [place] [facility]  [address] [facility] [id number] [place] [address]-
6907  17 Mar, 2015   

 

 [facility] [place] [facility]  [address] [id number] [place] [address]-
2368  17 Mar, 2015   

 

 [facility] [place] [facility]  [address] [id number] [place] [address]-
5088  09 Mar, 2015   

 

 [facility] [place] [facility]  [address] [id number] [place] [address]-
2196  09 Mar, 2015   

 

 [facility] [place] [facility]  [address] [facility] [id number] [place] [address]-
0504  06 Mar, 2015   

 

 [facility] [place] [facility]  [address] [id number] [place] [address]-
6907  03 Mar, 2015   

 

 [facility] [place] [facility]  [address] [facility] [id number] [place] [address]-
1688  03 Mar, 2015   

 

 [facility] [place] [facility]  [address] [facility] [id number] [place] [address]-
3235  ,    

 

 [facility] [place] [facility]  [address] [facility] [id number] [address]-
9984  ,    

 

 [facility] [place] [facility]  [address] [id number] [address]-
0498  ,    

 

 [facility] [place] [facility]  [address] [id number] [place] [address]-
0271  ,    

 

 [facility] [place] [facility]  [address] [facility] [id number] [place] [address]-
8581  ,    

 

 [facility] [place] [facility]  [address] [facility] [id number] [place] [address]-
2014  ,    

 

 [facility] [place] [facility]  [address] [id number] [place] [address]-
3257     

 

 [facility] [place] [facility]  [address] [id number] [place] [address]-
0801     

 

 [facility] [place] [facility]  [address] [id number] [place] [address]-
1908     

 

 [facility] [place] [facility]  [address] [id number] [place] [address]-
0245     

 

 [facility] [place] [facility]  [address] [id number] [place] [address]-
7348     

 

 [facility] [place] [facility]  [address] [id number] [place] [address]-
0911     

 

 [facility] [place] [facility]  [address] [id number] [place] [address]-
6002     

 

 [facility] [place] [facility]  [address] [id number] [place] [address]-
4385     

 

 [facility] [place] [facility]  [address] [id number] [place] [address]-
0422     

 

 [facility] [place] [facility]  [address] [id number] [place] [address]-
4205     

 

 [facility] [place] [facility]  [address] [id number] [place] [address]-
5199     

 

 [facility] [place] [facility]  [address] [id number] [place] [address]-
1461     

 

 [facility] [place] [facility]  [address] [id number] [place] [address]-
3411     

 

 [facility] [place] [facility]  [address] [id number] [place] [address]-
3873     

 

 [facility] [place] [facility]  [address] [id number] [place] [address]-
0406  23 Dec, 2014   

 

 [facility] [place] [facility]  [address] [id number] [place] [address]-
0681  23 Dec, 2014   

 

 [facility] [place] [facility]  [address] [id number] [place]
3295  22 Dec, 2014   

 

 [facility] [place] [facility]  [address] [id number] [place] [address]-
5596  22 Dec, 2014   

 

 [facility] [place] [facility]  [address] [id number] [place] [address]-
7626  17 Dec, 2014   

 

 [facility] [place] [facility]  [address] [id number] [place] [address]-
4925  17 Dec, 2014   

 

 [facility] [place] [facility]  [address] [id number] [place] [address]-
4644  15 Dec, 2014   

 

 [facility] [place] [facility]  [address] [id number] [place] [address]-
2007  15 Dec, 2014   

 

 [facility] [place] [facility]  [address] [id number] [place] [address]-
1701  12 Dec, 2014   

 

 [facility] [place] [facility]  [address] [id number] [place] [address]-
7787  08 Dec, 2014   

 

 [facility] [place] [facility]  [address] [id number] [place] [address]-
1100  08 Dec, 2014   

 

 [facility] [place] [facility]  [address] [id number] [place] [address]-
7382  08 Dec, 2014   

 

 [facility] [place] [facility]  [address] [id number] [place] [address]-
1974  08 Dec, 2014   

 

 [facility] [place] [facility]  [address] [id number][place] [address]-
3689     

 

 [facility] [place] [facility]  [address] [id number] [place] [address]-
9845     

 

 [facility] [place] [facility]  [address] [id number] [place] [address]-
4718     

 

 [facility] [place] [facility]  [address] [id number] [place] [address]-
8693     

 

 [facility] [place] [facility]  [address] [id number] [place] [address]-
3265  10 Nov, 2014   

 

 [facility] [place] [facility]  [address] [id number] [place] [address]-
6718  10 Nov, 2014   

 

 [facility] [place] [facility]  [address] [id number] [place] [address]-
8819  07 Oct, 2014   

 

 [facility] [place] [facility]  [address] [id number] [place] [address]-
2034  07 Oct, 2014   

 

 [facility] [place] [facility]  [address] [id number] [place] [address]-
1356  01 Oct, 2014   

 

 [facility] [place] [facility]  [address] [id number] [place] [address]-
3992  01 Oct, 2014   

 

 [facility] [place] [facility]  [address] [id number] [place] [address]-
6176  24 Sep, 2014   

 

 [facility] [place] [facility]  [address] [id number] [place] [address]-
9925  24 Sep, 2014   

 

 [facility] [place] [facility]  [address] [id number] [place] [address]-
0848  23 Sep, 2014   

 

 [facility] [place] [facility]  [address] [id number] [place] [address]-
0000  23 Sep, 2014   

 

 [facility] [place] [facility]  [address] [id number] [place] [address]-
0574  22 Sep, 2014   

 

 [facility] [place] [facility]  [address] [id number] [place] [address]-
3002  22 Sep, 2014   

 

 [facility] [place] [facility]  [address] [id number] [place] [address]-
8991  19 Aug, 2014   

 

 [facility] [place] [facility]  [address] [id number] [place] [address]-
9158  19 Aug, 2014   

 

 [facility] [place] [facility]  [address] [id number] [place] [address]-
2356     

 

 [facility] [place] [facility]  [address] [id number] [place] [address]-
4360     

 

 [facility] [place] [facility]  [address] [id number] [place] [address]-
0725  10 Eagle, 2014   

 

 [facility] [place] [facility]  [address] [id number] [place] [address]-
2419     

 

 [facility] [place] [facility]  [address] [id number] [place] [address]-
0477     

 

 [facility] [place] [facility]  [address] [id number] [place] [address]-
3923  07 May, 2014   

 

 [facility] [place] [facility]  [address] [id number] [place] [address]-
1343  07 May, 2014   

 

 [facility] [place] [facility]  [address] [id number] [place] [address]-
8662     

 

 [facility] [place] [facility]  [address] [id number] [place] [address]-
9230     

 

 [facility] [place] [facility]  [address] [id number] [place] [address]-
3644     

 

 [facility] [place] [facility]  [address] [id number] [place] [address]-
0150     

 

 [facility] [place] [facility]  [address] [id number] [place] [address]-
7012     

 

 [facility] [place] [facility]  [address] [id number] [place] [address]-
7747     

 

 [facility] [place] [facility]  [address] [id number] [place] [address]-
9101     

 

 [facility] [place] [facility]  [address] [id number] [place] [address]-
8647     

 

 [facility] [place] [facility]  [address] [id number] [place] [address]-
8338     

 

 [facility] [place] [facility]  [address] [id number] [place] [address]-
5765     

 

 [facility] [place] [facility]  [address] [id number] [place] [address]-
0420  17 Mar, 2014   

 

 [facility]  [address] [id number] [place] [address]-
6847  17 Mar, 2014   

 

 [facility] [place] [facility]  [address] [id number] [place] [address]-
1731     

 

 [facility] [place] [facility]  [address] [id number] [place] [address]-
1569     

 

 [facility] [place] [facility]  [address] [id number] [place] [address]-
4756     

 

 [facility] [place] [facility]  [address] [id number] [place] [address]-
1615     

 

 [facility] [place] [facility]  [address] [id number] [place]
9436  25 Oct, 2013   

 

 [facility] [place] [facility]  [address] [id number] [place] [address]-
9901  25 Oct, 2013   

 

 [facility] [place] [facility]  [address] [id number] [place] [address]-
0071  23 Sep, 2013   

 

 [facility] [place] [facility]  [address] [facility] [id number] [place] [address]-
8969  06 Aug, 2013   

 

 [facility] [place] [facility]  [address] [facility] [id number] [place] [address]-
7797  05 Aug, 2013   

 

 [facility] [place] [facility]  [address] [id number] [place] [address]-
4199     

 

 [facility] [place] [facility]  [address] [id number] [place] [address]
2547     

 

 [facility] [place] [facility]  [address] [id number] [place] [address]-
0816     

 

 [facility] [place] [facility]  [address] [id number] [place] [address]-
7079  28 Dec, 2012   

 

 [facility] [place] [facility]  [address] [id number] [place] [address]-
8695  19 Dec, 2012   

 

 [facility] [place] [facility]  [address] [id number] [place] [address]-
1432  19 Dec, 2012   

 

 [facility] [place] [facility]  [address] [id number] [place] [address]-
2300  13 Dec, 2012   

 

 [facility] [place] [facility]  [address] [id number] [place] [address]-
2755  13 Dec, 2012   

 

 [facility] [place] [facility]  [address] [id number] [place] [address]-
8415     

 

 [facility] [place] [facility]  [address] [id number] [place] [address]-
5198     

 

 [facility] [place] [facility]  [address] [id number] [place] [address]
3134  19 Oct, 2012   

 

 [facility] [place] [facility]  [address] [id number] [place] [address]-
0252  18 Oct, 2012   

 

 [facility] [place] [facility]  [address] [id number] [place] [address]-
2300  17 Oct, 2012   

 

 [facility] [place] [facility]  [address] [id number] [place] [address]-
1358  17 Oct, 2012   

 

 [facility] [place] [facility]  [address] [id number] [place] [address]-
4187  16 Oct, 2012   

 

 [facility] [place] [facility]  [address] [id number] [place] [address]
2132  16 Oct, 2012   

 

 [facility] [place] [facility]  [address] [id number] [place] [address]-
9492  15 Oct, 2012   

 

 [facility] [place] [facility]  [address] [id number] [place] [address]
4050  27 Sep, 2012   

 

 [facility] [place] [facility]  [address] [id number] [place] [address]-
2836  26 Sep, 2012   

 

 [facility] [place] [facility]  [address] [id number] [place] [address]-
2546     

 

 [facility] [place] [facility]  [address] [id number] [place] [address]-
6188     

 

 [facility]  [address] [id number] [place] [address]-
2829     

 

 [facility] [place] [facility]  [address] [id number] [place] [address]-
1306     

 

 [facility] [place] [facility]  [address] [facility] [id number] [place] [address]-
6116     

 

 [facility] [place] [facility]  [address] [id number] [place] [address]-
8455     

 

 [facility] [place] [facility]  [address] [id number] [place] [address]-
4238     

 

 [facility] [place] [facility]  [address] [id number] [place] [address]-
3046     

 

 [facility] [place] [facility]  [address] [facility] [id number] [place] [address]-
3206  01 Mar, 2012   

 

 [facility] [place] [facility]  [address] [id number] [place] [address]-
5865     

 

 [facility] [place] [facility]  [address] [id number] [place] [address]-
7773     

 

 [facility] [place] [facility]  [address] [id number] [place] [address]-
6470     

 

 [facility] [place] [facility]  [address] [id number] [address]-
1998  10 Andrei, 2012   

 

 [facility] [place] [facility]  [address] [id number][place] [address]-
1804     

 

 [facility] [place] [facility]  [address] [id number] [place] [address]-
0501  20 Dec, 2011   

 

 [facility] [place] [facility]  [address] [id number] [place] [address]-
9111  20 Dec, 2011   

 

 [facility] [place] [facility]  [address] [facility] [id number] [place] [address]-
5759  13 Dec, 2011   

 

 [facility] [place] [facility]  [id number] [address]
9410  08 Dec, 2011   

 

 [facility]  [id number], [place] [address]
1804  08 Dec, 2011   

 

 [facility]  [id number], [place] [address]
8536  06 Dec, 2011   

 

 [facility] [facility]  [id number] [address]
3182  10 Oct, 2011   

 

 [facility]  [id number], [place] [address]-
7138  10 Oct, 2011   

 

 [facility]  [id number] [place] [id number]
[id number]     

 

 [facility]  [id number], [place] [address]-
8169  20 Dec, 2010   

 

 [facility]  [id number] [address]
2901  20 Dec, 2010   

 

 [facility]  [id number] [address]-
[address]     

 

 [facility]  [id number] [address]-
2708     

 

 [facility]  [id number] [address]-
9708  15 Oct, 2009   

 

 [facility]  [id number] [address]-
8851  15 Oct, 2009   

 

 [facility]  [id number] [address]-
9786     







IMMUNIZATIONS

No Known Immunizations



SOCIAL HISTORY

Never Assessed



REASON FOR VISIT

Sinus Drainage and cough started Sat [facility]



PLAN OF CARE







 Activity  Details









  









 Follow Up  prn Reason:







VITAL SIGNS







 Height  62 in  2017

 

 Weight  154.6 lbs  2017

 

 Temperature  97.9 degrees Fahrenheit  2017

 

 Heart Rate  84 bpm  2017

 

 Respiratory Rate  20   2017

 

 BMI  28.27 kg/m2  2017

 

 Blood pressure systolic  110 mmHg  2017

 

 Blood pressure diastolic  74 mmHg  2017







MEDICATIONS







 Medication  Instructions  Dosage  Frequency  Start Date  End Date  Duration  
Status

 

 Dae Contour Test -  DBA43-E15.9 2 times a day- 3 times weekly.  test blood 
sugar             Active

 

 Glucocard Expression Monitor w/Device     as directed     08 May, 2017        
Active

 

 Cinnamon 500 MG  Orally 2 times a day  2 capsules  12h           Active

 

 Amaryl 2 MG     TAKE TWO TABLETS BY MOUTH TWICE DAILY           90  Active

 

 Ibuprofen 800 MG  Orally Once a day  1 tablet  24h           Active

 

 MetFORMIN HCl  MG     TAKE TWO TABLETS BY MOUTH TWICE DAILY WITH MEALS.  
MUST BE [employer] BRAND           30  Active

 

 Potassium 99 MG  Orally Once a day  1 tablet  24h           Active

 

 Xanax 1 MG  Orally Once a day  1 tablet  24h       28 days  Active

 

 Augmentin 875-125 MG  Orally every 12 hrs  1 tablet  12h  19 Sep, 2017  29 Sep
, 2017  10 day(s)  Active







RESULTS

No Results



PROCEDURES

No Known procedures



INSTRUCTIONS





MEDICATIONS ADMINISTERED

No Known Medications



MEDICAL (GENERAL) HISTORY







 Type  Description  Date

 

 Medical History  hypothyroidism   

 

 Medical History  type II diabetes   

 

 Medical History  back pain   

 

 Medical History  hyperlipidemia   

 

 Medical History  anxiety   

 

 Medical History  mood disorder   

 

 Medical History  heart murmur   

 

 Medical History  chronic bronchitis   

 

 Medical History  [place]   

 

 Surgical History  appendectomy   

 

 Hospitalization History  childbirth   

 

 Hospitalization History  pancreatitis  

 

 Hospitalization History  appendectomy   

 

 Hospitalization History  Anaphylaxis to Bactrim

## 2019-01-30 NOTE — XMS REPORT
[facility]

 Created on: 2018



GalvezLibra levy

External Reference #: [id number]

: 1965

Sex: Female



Demographics







 Address  [address]  [address]

 

 Preferred Language  Unknown

 

 Marital Status  Unknown

 

 Anabaptism Affiliation  Unknown

 

 Race  Unknown

 

 Ethnic Group  Unknown





Author







 Author  ANIKET MAYFIELD

 

 [facility]

 

 Address  [address] [place]  [address]



 

 Phone  [phone]







Care Team Providers







 Care Team Member Name  Role  Phone

 

 ANIKET MAYFIELD  Unavailable  [phone]







PROBLEMS







 Type  Condition  ICD9-CM Code  RRB62-NF Code  Onset Dates  Condition Status  
SNOMED Code

 

 Problem  Hypertriglyceridemia     E78.1     Active  697006721

 

 Problem  Status post cholecystectomy     Z90.49     Active  494099221

 

 Problem  Anxiety disorder, unspecified     F41.9     Active  883555121

 

 Problem  Acquired hypothyroidism     E03.9     Active  626991017

 

 Problem  Diabetes     E11.9     Active  659288735







ALLERGIES

No Information



ENCOUNTERS







 Encounter  Location  Date  Diagnosis

 

 [place] [id number] [address]-
5951     

 

 [facility]  [id number] [place] [address] 11306-
6643     

 

 [place] [address] [address]-
1495    Ketoacidosis E87.2 ; Status post cholecystectomy Z90.49 ; 
Diabetes E11.9 ; Acquired hypothyroidism E03.9 ; Hypertriglyceridemia E78.1 and 
Vaginal yeast infection B37.3

 

 [place] [id number] [place] [address] 35896-
4739  15 May, 2018   

 

 [place] [id number] [place] [address] 02284-
8884     

 

 [facility]  [address] [address]-
5314  20 Mar, 2018   

 

 [facility]  [id number] [place] [address] 45643-
6717     

 

 [place] [address] [address]-
3753     

 

 [facility]  [id number] [place] [address] 56204-
8283  10 Andrei, 2018  Diabetes E11.9 and Drug-induced acute pancreatitis with 
uninfected necrosis K85.31

 

 [facility]  [id number] [place] [address] 21525-
2895  27 Dec, 2017   

 

 [facility]  [id number] [place] [address] 57332-
8708     

 

 [facility]  [id number] [place] [address] [phone]
-[phone]  10 Nov, 2017  Crushing injury of right foot, initial encounter S97.81XA

 

 [facility]  [id number] [place] [address] 57774-
7039  27 Sep, 2017   

 

 [facility]  [id number] [place] [address] 31364-
1062  21 Sep, 2017   

 

 [facility]  [id number] [place] [address] [phone]
-[phone]  19 Sep, 2017  Acute non-recurrent pansinusitis J01.40

 

 [facility]  [id number] [place] [address] 31975-
7322  19 Sep, 2017   

 

 [facility]  [id number] [place] [address] 38641-
8880  04 Aug, 2017   

 

 [facility]  [id number] [place] [address] 58705-
1510     

 

 [facility]  [id number] [place] [address] 53166-
7579  26 May, 2017   

 

 [facility]  [address] [address]-
1158  08 May, 2017  Diabetes E11.9 ; Anxiety disorder, unspecified F41.9 and 
Acquired hypothyroidism E03.9

 

 [facility]  [id number] [place] [address] 87783-
0693  01 May, 2017   

 

 [facility]  [address], [place] [address]-
4494     

 

 [facility]  [id number] [place] [address] 74238-
4738     

 

 [facility]  [id number] [place] [address] 48294-
2170  06 Mar, 2017   

 

 [facility]  [id number] [place] [address] 32578-
0646     

 

 [facility]  [id number] [place] [address] 10090-
5443     

 

 [facility]  [id number] [place] [address] 83664-
6877     

 

 [facility]  [id number] [place] [address] 60856-
9754     

 

 [facility]  [id number] [place] [address] 94569-
7904    Acute non-recurrent maxillary sinusitis J01.00

 

 [facility]  [address] [place] [address] 57126-
1590     

 

 [facility]  [address]65100[place], [place] [address]-
4014     

 

 [facility]  [address]65100[place], [place] [address]-
5383  12 Dec, 2016   

 

 [facility]  [id number] [place] [address] 37407-
9822  15 Nov, 2016   

 

 [facility]  [address] [place] [address] 53945-
6525  12 Oct, 2016  Diabetes E11.9

 

 [facility]  [address] [place] [address] 38620-
3631  28 Sep, 2016  Pelvic pain R10.2 ; Leukocytosis, unspecified D72.829 ; 
Constipation, unspecified constipation type K59.00 and History of pancreatitis 
Z87.19

 

 [facility]  [address] [facility] [id number] [address]-
6907  22 Sep, 2016   

 

 [facility]  [address] [facility] [id number] [address]-
9016  14 Sep, 2016  Diabetes E11.9

 

 [facility]  [address] [facility] [id number] [address]
2546  13 Sep, 2016   

 

 [facility]  [address] [facility] [id number][place] [address]
2545  09 Sep, 2016  Vaginal yeast infection B37.3

 

 [facility]  [address] [facility] [id number] [place] [address]-
6046  08 Sep, 2016   

 

 [facility]  [id number] [address]-
7655  30 Aug, 2016  Diabetes E11.9

 

 [facility]  [id number], [place] [address]-
1756  25 Aug, 2016  Anxiety disorder, unspecified F41.9

 

 [facility]  [address] [facility] [id number], [place] [address]-
7491  17 Aug, 2016  Vaginal yeast infection B37.3

 

 [facility]  [address] [facility] [id number][place] [address]-
5402  17 Aug, 2016   

 

 [facility]  [id number] [address]-
7431    Anxiety disorder, unspecified F41.9

 

 [facility]  [id number] [address]-
5516    Vaginal yeast infection B37.3

 

 [facility]  [address] [facility] [id number], [place] [address]-
7248    Anxiety disorder, unspecified F41.9

 

 [facility]  [address] [facility] [id number][place] [address]-
5547    Anxiety disorder, unspecified F41.9

 

 [facility]  [id number] [place] [address] 34702-
3879  06 May, 2016  Anxiety disorder, unspecified F41.9

 

 [facility]  [address] [place] [address] 84978-
9054  04 May, 2016  Diabetes E11.9

 

 [facility]  [id number] [place] [address] 92099-
4991    Anxiety disorder, unspecified F41.9

 

 [facility]  [id number] [place] [address] 24547-
0586     

 

 [facility]  [id number] [place] [address] 35870-
9483  25 Mar, 2016  Vaginal yeast infection B37.3

 

 [facility]  [id number] [place] [address] 46684-
4038  15 Mar, 2016   

 

 [facility]  [id number] [place] [address] 04037-
4860     

 

 [facility]  [id number] [place] [address] 02413-
8753     

 

 [facility]  [id number] [place] [address] 5[address]24-
1965     

 

 [facility]  [id number] [place] [address] 26973-
4294  15 Andrei, 2016   

 

 [facility]  [address] [place] [address] 72679-
1699  22 Dec, 2015   

 

 [facility]  [id number] [place] [address] 31405-
7266    Diabetes E11.9 ; Acquired hypothyroidism E03.9 ; 
Hyperlipidemia, unspecified hyperlipidemia E78.5 and Anxiety F41.9

 

 [facility]  [address] [place] [address] 79214-
7215     

 

 [facility]  [address] [place] [address] 84318-
4993  27 Oct, 2015   

 

 [facility]  [id number] [place] [address] [place] [address]-
2546  29 Sep, 2015   

 

 [facility]  [address]65100[place] [address]-
2546  01 Sep, 2015   

 

 [facility]  [address]00565100[place], [place] [address]-
2546  04 Aug, 2015   

 

 [facility]  [id number] [place] [address] 51479-
2546  15 Jul, 2015   

 

 [facility]  [id number] [place] [address] 30847-
2546    DUB (dysfunctional uterine bleeding) 626.8 and Pap test, as 
part of routine gynecological examination V76.2

 

 [facility]  [address] [place] [address] 29702-
8486     

 

 [facility]  [id number] [place] [address] 01050-
3586     

 

 [facility]  [address] [place] [address] 87585-
7926     

 

 [facility]  [address]65100[place], [place] [address]-
1436     

 

 [facility]  [id number] [address]-
5506  15 Eagle, 2015  Diabetes 250.00

 

 [facility]  [address]65100[place], [place] [address]
2546     

 

 [facility]  [address]65100[place], [place] [address]-
2546  19 May, 2015   

 

 [facility]  [address]00565100[place], [place] [address]
2546  13 May, 2015  Diabetes 250.00

 

 [facility]  [address]65100[place], [place] [address]
2546  12 May, 2015   

 

 [facility]  [address]65100[place], [place] [address]-
2546  07 May, 2015   

 

 [facility] [place] [facility]  [address] [id number] [place] [address]
2546  07 May, 2015   

 

 [facility] [place] [facility]  [address] [id number] [place] [address]-
9217  05 May, 2015   

 

 [facility] [place] [facility]  [address] [id number] [place] [address]-
2546  01 May, 2015   

 

 [facility] [place] [facility]  [address] [id number] [place] [address]-
3602     

 

 [facility] [place] [facility]  [address] [id number] [place] [address]-
6539     

 

 [facility] [place] [facility]  [address] [id number] [place] [address]-
8770     

 

 [facility] [place] [facility]  [address] [id number] [place] [address]-
8232  17 Mar, 2015   

 

 [facility] [place] [facility]  [address] [id number] [place] [address]-
9898  17 Mar, 2015   

 

 [facility] [place] [facility]  [address] [id number] [place] [address]-
0197  17 Mar, 2015   

 

 [facility] [place] [facility]  [address] [id number] [place] [address]-
7748  17 Mar, 2015   

 

 [facility] [place] [facility]  [address] [id number] [place] [address]-
2696  09 Mar, 2015   

 

 [facility] [place] [facility]  [address] [id number] [place] [address]-
7416  09 Mar, 2015   

 

 [facility] [place] [facility]  [address] [id number] [place] [address]-
254  06 Mar, 2015   

 

 [facility] [place] [facility]  [address] [id number] [place] [address]-
2546  03 Mar, 2015   

 

 [facility] [place] [facility]  [address] [id number] [place] [address]-
2546  03 Mar, 2015   

 

 [facility] [place] [facility]  [address] [id number] [place] [address]-
0244  ,    

 

 [facility] [place] [facility]  [address] [id number] [place] [address]-
2297  ,    

 

 [facility] [place] [facility]  [address] [id number] [place] [address]-
2836  ,    

 

 [facility] [place] [facility]  [address] [facility] [id number] [place] [address]-
8106  ,    

 

 [facility] [place] [facility]  [address] [id number] [place] [address]-
7472  ,    

 

 [facility] [place] [facility]  [address] [id number] [place] [address]-
7865  ,    

 

 [facility] [place] [facility]  [address] [facility] [id number] [place] [address]-
1009     

 

 [facility] [place] [facility]  [address] [facility] [id number] [place] [address]-
1454     

 

 [facility] [place] [facility]  [address] [facility] [id number] [place] [address]-
6909     

 

 [facility] [place] [facility]  [address] [facility] [id number] [place] [address]-
3382     

 

 [facility] [place] [facility]  [address] [facility] [id number] [place] [address]-
2806     

 

 [facility] [place] [facility]  [address] [facility] [id number] [place] [address]-
8931     

 

 [facility] [place] [facility]  [address] [facility] [id number][place] [address]-
9695     

 

 [facility] [place] [facility]  [address] [id number][place] [address]-
5816     

 

 [facility] [place] [facility]  [address] [facility] [id number][place] [address]-
9170     

 

 [facility] [place] [facility]  [address] [facility] [id number][place] [address]-
5024     

 

 [facility] [place] [facility]  [address] [id number] [place] [address]-
5876     

 

 [facility] [place] [facility]  [address] [id number] [place] [address]-
9751     

 

 [facility] [place] [facility]  [address] [id number] [place] [address]-
5623     

 

 [facility] [place] [facility]  [address] [id number] [place] [address]-
7789     

 

 [facility] [place] [facility]  [address] [id number] [place] [address]-
3430  23 Dec, 2014   

 

 [facility] [place] [facility]  [address] [id number] [place] [address]-
5632  23 Dec, 2014   

 

 [facility] [place] [facility]  [address] [id number] [place] [address]-
6107  22 Dec, 2014   

 

 [facility] [place] [facility]  [address] [id number] [place] [address]-
7167  22 Dec, 2014   

 

 [facility] [place] [facility]  [address] [id number] [place] [address]-
8229  17 Dec, 2014   

 

 [facility] [place] [facility]  [address] [id number] [place] [address]-
8645  17 Dec, 2014   

 

 [facility] [place] [facility]  [address] [id number] [place] [address]-
1032  15 Dec, 2014   

 

 [facility] [place] [facility]  [address] [id number] [place] [address]-
0847  15 Dec, 2014   

 

 [facility] [place] [facility]  [address] [id number] [place] [address]-
4116  12 Dec, 2014   

 

 [facility] [place] [facility]  [address] [id number] [place] [address]-
0729  08 Dec, 2014   

 

 [facility] [place] [facility]  [address] [id number] [place] [address]-
0109  08 Dec, 2014   

 

 [facility] [place] [facility]  [address] [id number] [place] [address]-
2972  08 Dec, 2014   

 

 [facility] [place] [facility]  [address] [id number] [place] [address]-
2117  08 Dec, 2014   

 

 [facility] [place] [facility]  [address] [id number] [place] [address]-
2229     

 

 [facility] [place] [facility]  [address] [facility] [id number] [place] [address]-
6475     

 

 [facility] [place] [facility]  [address] [id number] [place] [address]-
0711     

 

 [facility] [place] [facility]  [address] [id number] [place] [address]-
1019     

 

 [facility] [place] [facility]  [address] [id number] [place] [address]-
1886  10 Nov, 2014   

 

 [facility] [place] [facility]  [address] [id number] [place] [address]-
9720  10 Nov, 2014   

 

 [facility] [place] [facility]  [address] [id number][place] [address]-
8651  07 Oct, 2014   

 

 [facility] [place] [facility]  [address] [id number][place] [address]-
0818  07 Oct, 2014   

 

 [facility] [place] [facility]  [address] [id number] [address]-
4921  01 Oct, 2014   

 

 [facility] [place] [facility]  [address] [id number][place] [address]-
1149  01 Oct, 2014   

 

 [facility] [place] [facility]  [address] [id number], [place] [address]-
6436  24 Sep, 2014   

 

 [facility] [place] [facility]  [address] [id number] [place] [address]-
2645  24 Sep, 2014   

 

 [facility] [place] [facility]  [address] [id number][place] [address]-
8772  23 Sep, 2014   

 

 [facility] [place] [facility]  [address] [id number][place] [address]-
9519  23 Sep, 2014   

 

 [facility] [place] [facility]  [address] [id number] [place] [address]-
4462  22 Sep, 2014   

 

 [facility] [place] [facility]  [address] [id number] [place] [address]-
0982  22 Sep, 2014   

 

 [facility] [place] [facility]  [address] [id number] [place] [address]-
2098  19 Aug, 2014   

 

 [facility] [place] [facility]  [address] [id number] [place] [address]-
3934  19 Aug, 2014   

 

 [facility] [place] [facility]  [address] [id number] [place] [address]-
9332     

 

 [facility] [place] [facility]  [address] [id number] [place] [address]-
8078     

 

 [facility] [place] [facility]  [address] [id number] [place] [address]-
5108  10 Eagle, 2014   

 

 [facility] [place] [facility]  [address] [id number] [place] [address]-
4800     

 

 [facility] [place] [facility]  [address] [id number] [place] [address]-
3111     

 

 [facility] [place] [facility]  [address] [id number] [place] [address]-
9253  07 May, 2014   

 

 [facility] [place] [facility]  [address] [id number] [place] [address]-
1543  07 May, 2014   

 

 [facility] [place] [facility]  [address] [id number] [place] [address]-
9297     

 

 [facility] [place] [facility]  [address] [id number] [place] [address]-
3027     

 

 [facility] [place] [facility]  [address] [id number] [place] [address]-
5651     

 

 [facility] [place] [facility]  [address] [id number] [place] [address]-
3049     

 

 [facility] [place] [facility]  [address] [id number] [place] [address]-
0594     

 

 [facility] [place] [facility]  [address] [id number] [place] [address]-
9911     

 

 [facility] [place] [facility]  [address] [id number] [place] [address]-
9949     

 

 [facility] [place] [facility]  [address] [id number] [place] [address]-
9796     

 

 [facility] [place] [facility]  [address] [id number] [place] [address]-
1862     

 

 [facility] [place] [facility]  [address] [id number] [place] [address]-
1175     

 

 [facility] [place] [facility]  [address] [id number] [place] [address]-
7806  17 Mar, 2014   

 

 [facility] [place] [facility]  [address] [id number] [place] [address]-
7976  17 Mar, 2014   

 

 [facility] [place] [facility]  [address] [id number] [place] [address]-
1589     

 

 [facility] [place] [facility]  [address] [id number] [place] [address]-
2197     

 

 [facility] [place] [facility]  [address] [id number] [place] [address]-
6654     

 

 [facility] [place] [facility]  [address] [id number] [place] [address]-
8381     

 

 [facility] [place] [facility]  [address] [id number][place] [address]-
5253  25 Oct, 2013   

 

 [facility] [place] [facility]  [address] [id number] [place] [address]-
7750  25 Oct, 2013   

 

 [facility] [place] [facility]  [address] [id number] [place] [address]-
7966  23 Sep, 2013   

 

 [facility] [place] [facility]  [address] [id number] [place] [address]-
0140  06 Aug, 2013   

 

 [facility] [place] [facility]  [address] [id number][place] [address]-
6956  05 Aug, 2013   

 

 [facility] [place] [facility]  [address] [id number] [place] [address]-
4733     

 

 [facility] [place] [facility]  [address] [id number] [place] [address]-
2435     

 

 [facility] [place] [facility]  [address] [facility] [id number] [place] [address]-
6893     

 

 [facility] [place] [facility]  [address] [id number] [place] [address]-
9597  28 Dec, 2012   

 

 [facility] [place] [facility]  [address] [id number] [place] [address], [place] [address]-
3530  19 Dec, 2012   

 

 [facility] [place] [facility]  [address] [id number] [place] [address]-
3454  19 Dec, 2012   

 

 [facility]  [address] [facility] [id number] [place] [address], [place] [address]-
8546  13 Dec, 2012   

 

 [facility] [place] [facility]  [address] [facility] [id number] [place] [address]-
2891  13 Dec, 2012   

 

 [facility] [place] [facility]  [address] [facility] [id number] [place] [address]-
4148     

 

 [facility] [place] [facility]  [address] [facility] [id number] [place] [address]-
1451     

 

 [facility] [place] [facility]  [address] [facility] [id number], [place] [address]-
3945  19 Oct, 2012   

 

 [facility] [place] [facility]  [address] [id number][place] [address]-
2612  18 Oct, 2012   

 

 [facility] [place] [facility]  [address] [id number] [place] [address]-
3759  17 Oct, 2012   

 

 [facility] [place] [facility]  [address] [facility] [id number][place] [address]-
4526  17 Oct, 2012   

 

 [facility] [place] [facility]  [address] [facility] [id number], [place] [address]-
4085  16 Oct, 2012   

 

 [facility] [place] [facility]  [address] [id number] [place] [address]-
1560  16 Oct, 2012   

 

 [facility] [place] [facility]  [address] [id number] [place] [address]-
7230  15 Oct, 2012   

 

 [facility] [place] [facility]  [address] [id number] [place] [address]-
4356  27 Sep, 2012   

 

 [facility] [place] [facility]  [address] [id number] [place] [address]-
2406  26 Sep, 2012   

 

 [facility] [place] [facility]  [address] [id number] [place] [address]-
9729     

 

 [facility] [place] [facility]  [address] [id number] [place] [address]-
2337     

 

 [facility] [place] [facility]  [address] [id number] [place] [address]-
7230     

 

 [facility] [place] [facility]  [address] [id number] [place] [address]-
8683     

 

 [facility] [place] [facility]  [address] [id number] [place] [address]-
1936     

 

 [facility] [place] [facility]  [address] [id number] [place] [address]-
0478     

 

 [facility] [place] [facility]  [address] [id number] [place] [address]-
7994     

 

 [facility] [place] [facility]  [address] [id number] [place] [address]-
8114     

 

 [facility] [place] [facility]  [address] [id number] [place] [address]-
0977  01 Mar, 2012   

 

 [facility] [place] [facility]  [address] [id number] [place] [address]-
2726     

 

 [facility] [place] [facility]  [address] [id number] [place] [address]-
8676     

 

 [facility] [place] [facility]  [address] [id number] [place] [address]-
7116     

 

 [facility]  [address] [id number] [place] [address]-
3548  10 Andrei, 2012   

 

 [facility] [place] [facility]  [address] [id number] [place] [address]-
7406     

 

 [facility] [place] [facility]  [address] [id number] [place] [address]-
5105  20 Dec, 2011   

 

 [facility] [place] [facility]  [address] [id number] [place] [address]-
8967  20 Dec, 2011   

 

 [facility] [place] [facility]  [address] [id number] [place] [address]-
1673  13 Dec, 2011   

 

 [facility] [place] [facility]  [address] [id number] [place] [address]-
0026  08 Dec, 2011   

 

 [facility] [place] [facility]  [address] [id number] [place] [address]-
7656  08 Dec, 2011   

 

 [facility] [place] [facility]  [address] [id number] [place] [address]-
9617  06 Dec, 2011   

 

 [facility] [place] [facility]  [address] [id number] [place] [address]-
7305  10 Oct, 2011   

 

 [facility] [place] [facility]  [address] [id number] [place] [address]-
8687  10 Oct, 2011   

 

 [facility] [place] [facility]  [address] [id number][place] [address]-
5193     

 

 [facility] [place] [facility]  [address] [id number][place] [address]-
4208  20 Dec, 2010   

 

 [facility] [place] [facility]  [address] [id number] [place] [address]-
7256  20 Dec, 2010   

 

 [facility] [place] [facility]  [address] [id number] [place] [address]-
9846     

 

 [facility] [place] [facility]  [address] [id number] [place] [address]-
2546     

 

 [facility] [place] [facility]  [address] [facility] [id number] [place] [address]-
9009  15 Oct, 2009   

 

 [facility]  [address] [facility] [id number] [place] [address]-
7100  15 Oct, 2009   

 

 [facility]  [address] [facility] [id number] [place] [address]-
9238     







IMMUNIZATIONS

No Known Immunizations



SOCIAL HISTORY

Never Assessed



REASON FOR VISIT

Controlled Med Refill 18



PLAN OF CARE





VITAL SIGNS





MEDICATIONS







 Medication  Instructions  Dosage  Frequency  Start Date  End Date  Duration  
Status

 

 Xanax 1 MG  Orally Once a day  1 tablet  24h       28 days  Active







RESULTS

No Results



PROCEDURES

No Known procedures



INSTRUCTIONS





MEDICATIONS ADMINISTERED

No Known Medications



MEDICAL (GENERAL) HISTORY







 Type  Description  Date

 

 Medical History  hypothyroidism   

 

 Medical History  type II diabetes   

 

 Medical History  back pain   

 

 Medical History  hyperlipidemia   

 

 Medical History  anxiety   

 

 Medical History  mood disorder   

 

 Medical History  heart murmur   

 

 Medical History  chronic bronchitis   

 

 Medical History  [place]   

 

 Surgical History  appendectomy   

 

 Surgical History  gallbladder removal   

 

 Hospitalization History  childbirth   

 

 Hospitalization History  pancreatitis  

 

 Hospitalization History  appendectomy   

 

 Hospitalization History  Anaphylaxis to Bactrim

## 2019-01-30 NOTE — XMS REPORT
[facility]

 Created on: 2015



Libra Galvez

External Reference #: [id number]

: 1965

Sex: Female



Demographics







 Address  [address]  [address]

 

 Home Phone  [phone]

 

 Preferred Language  Unknown

 

 Marital Status  Unknown

 

 Restorationist Affiliation  Unknown

 

 Race  White

 

 Ethnic Group  Not  or 





Author







 Author  ANIKET MAYFIELD

 

 Organization  [employer]

 

 Address  Unknown

 

 Phone  Unavailable







Care Team Providers







 Care Team Member Name  Role  Phone

 

 ANIKET MAYFIELD  CP  Unavailable



                                                                



Allergies

          No Known Allergies                                                   
                                     



Problems

          





 Problem Type  Condition  Code  Onset Dates  Condition Status

 

 Problem  Pure hyperglyceridemia  272.1     Active

 

 Problem  Dysuria  788.1     Active

 

 Problem  Other bursitis disorders  727.3     Active

 

 Problem  Diabetes  250.00     Active

 

 Problem  Unspecified episodic mood disorder  296.90     Active

 

 Problem  Anxiety state, unspecified  300.00     Active

 

 Problem  Lumbago  724.2     Active

 

 Problem  Other chronic nonalcoholic liver disease  571.8     Active



                                                                               
                                                                               



Medications

          





 Medication  Code System  Code  Instructions  Start Date  End Date  Status  
Dosage

 

 Xanax  NDC  [address]-47  1 MG Orally Once a day  May 12, 2015        1 tablet



                                                                              



Results

          No Known Results                                                     
               



Summary Purpose

          [employer] Submission

## 2019-01-30 NOTE — DIAGNOSTIC IMAGING REPORT
PROCEDURE: CT abdomen and pelvis with contrast.



TECHNIQUE: Multiple contiguous axial images were obtained through

the abdomen and pelvis after administration of intravenous

contrast. 



INDICATION:  Nausea and vomiting. Upper abdominal pain. 



COMPARISON: Right upper quadrant ultrasound 05/26/2018.



FINDINGS: Lung bases are clear. Cholecystectomy. Reported

appendectomy. Indeterminate enhancing lesion in the posterior

right hepatic lobe near the dome measuring up 1.4 x 1.3 cm. There

is a similar lesion in the inferior right hepatic lobe measuring

0.9 x 1.0 cm. The pancreas, spleen, adrenals, kidneys, collecting

systems and unopacified bladder are negative. Low-attenuation

region in the left ovary measures up 1.7 cm and may represent

ovarian cyst. There are inflammatory changes in the mesentery and

retroperitoneum extending from the duodenum into the right colic

gutter about the cecum. No fluid collections. No free

intraperitoneal air. No lymphadenopathy. Mild fluid-filled

distention of most of the small bowel. No evidence of bowel

obstruction. No bowel wall thickening.



IMPRESSION:



1. Mild fluid-filled distention of most of the small bowel. No

evidence of bowel obstruction. There are also mild inflammatory

changes in the mesentery extending from region of the duodenum to

the right colic gutter. No fluid collections or evidence of bowel

obstruction. Findings most likely represent a nonspecific

enteritis versus mesenteric panniculitis.



2. There are two indeterminate enhancing lesions in the right

hepatic lobe. The appearance is most compatible with benign

hemangiomas but is indeterminate on this single phase exam.

Additionally, these lesions were not specifically identified on

the prior ultrasound. These could be better evaluated with

nonemergent dedicated multiphase CT.



Dictated by: 



  Dictated on workstation # [id number]

## 2019-01-30 NOTE — XMS REPORT
[facility]

 Created on: 2018



GalvezLibra levy

External Reference #: [id number]

: 1965

Sex: Female



Demographics







 Address  [address]  [address]

 

 Preferred Language  Unknown

 

 Marital Status  Unknown

 

 Restorationist Affiliation  Unknown

 

 Race  Unknown

 

 Ethnic Group  Unknown





Author







 Author  ANIKET MAYFIELD

 

 Organization  [facility]

 

 Address  [address] [place]  [address]



 

 Phone  [phone]







Care Team Providers







 Care Team Member Name  Role  Phone

 

 ANIKET MAYFIELD  Unavailable  [phone]







PROBLEMS







 Type  Condition  ICD9-CM Code  FAJ94-XC Code  Onset Dates  Condition Status  
SNOMED Code

 

 Problem  Violation of controlled substance agreement     Z91.14     Active  
[id number]

 

 Problem  Status post cholecystectomy     Z90.49     Active  891834372

 

 Problem  Diabetes     E11.9     Active  010309005

 

 Problem  Anxiety disorder, unspecified     F41.9     Active  471917401

 

 Problem  Hypertriglyceridemia     E78.1     Active  907905302

 

 Problem  Acquired hypothyroidism     E03.9     Active  723190489







ALLERGIES

No Information



ENCOUNTERS







 Encounter  Location  Date  Diagnosis

 

 [place] [address] [address]-
8548     

 

 [facility]  [address] [place] [address] 
[id number]    Dental examination Z01.20

 

 [facility]  [address] 
[id number]  10 Jul, 2018  Dental examination Z01.20 and Dental caries K02.9

 

 [place] [address] [address]-
9056    Benzodiazepine use agreement exists Z02.89 ; Therapeutic 
drug monitoring Z51.81 and Violation of controlled substance agreement Z91.14

 

 [place] [address] [place] [id number] [place] [address] 30053-
1669     

 

 [place] [address] [address]-
5344    Ketoacidosis E87.2 ; Status post cholecystectomy Z90.49 ; 
Diabetes E11.9 ; Acquired hypothyroidism E03.9 ; Hypertriglyceridemia E78.1 and 
Vaginal yeast infection B37.3

 

 [facility]  [address] [place] [id number] [place] [address]-
1859  15 May, 2018   

 

 [facility]  [id number] [place] [address] 99150-
0376     

 

 [facility]  [id number] [place] [address] 98975-
3875  20 Mar, 2018   

 

 [facility]  [address] [address]-
8322     

 

 [facility]  [id number] [place] [address] 48081-
6967     

 

 [facility]  [address] [place] [id number] [place] [address] 45304-
7484  10 Andrei, 2018  Diabetes E11.9 and Drug-induced acute pancreatitis with 
uninfected necrosis K85.31

 

 [place] [address] [address]-
3072  27 Dec, 2017   

 

 [facility]  [id number] [place] [address] 19215-
6417     

 

 [facility]  [id number] [place] [address] 65748
-7460  10 Nov, 2017  Crushing injury of right foot, initial encounter S97.81XA

 

 [facility]  [id number] [place] [address] 75245-
2126  27 Sep, 2017   

 

 [facility]  [id number] [place] [address] 13200-
9035  21 Sep, 2017   

 

 [facility]  [id number] [place] [address] [phone]
-[phone]  19 Sep, 2017  Acute non-recurrent pansinusitis J01.40

 

 [facility]  [id number] [place] [address] 81132-
1998  19 Sep, 2017   

 

 [facility]  [id number] [place] [address] 73087-
9878  04 Aug, 2017   

 

 [facility]  [address] [address]-
5995     

 

 [facility]  [id number] [place] [address] 60246-
2153  26 May, 2017   

 

 [facility]  [id number] [place] [address] 77378-
3915  08 May, 2017  Diabetes E11.9 ; Anxiety disorder, unspecified F41.9 and 
Acquired hypothyroidism E03.9

 

 [facility]  [id number] [address]-
8765  01 May, 2017   

 

 [facility]  [id number] [place] [address] 37788-
2360     

 

 [facility]  [id number] [place] [address] 81236-
7918     

 

 [facility]  [id number] [place] [address] 80995-
3332  06 Mar, 2017   

 

 [facility]  [address] [place] [address] 13970-
3532     

 

 [facility]  [address] [place] [address] 86008-
0926     

 

 [facility]  [address] [address]-
5771     

 

 [facility]  [id number] [place] [address] 63920-
3641     

 

 [facility]  [id number] [address]-
8318    Acute non-recurrent maxillary sinusitis J01.00

 

 [facility]  [id number] [place] [address] 36460-
0284     

 

 [facility]  [address] [address]-
5607     

 

 [facility]  [id number] [place] [address] 21477-
1742  12 Dec, 2016   

 

 [facility]  [address] [address]-
5769  15 Nov, 2016   

 

 [facility]  [address] [place] [address] 16984-
2563  12 Oct, 2016  Diabetes E11.9

 

 [facility]  [address] [address]-
1682  28 Sep, 2016  Pelvic pain R10.2 ; Leukocytosis, unspecified D72.829 ; 
Constipation, unspecified constipation type K59.00 and History of pancreatitis 
Z87.19

 

 [facility]  [address] [place] [address] 95846-
1971  22 Sep, 2016   

 

 [facility]  [address] [place] [address] 68745-
8940  14 Sep, 2016  Diabetes E11.9

 

 [facility]  [id number] [place] [address] 95891-
9894  13 Sep, 2016   

 

 [facility]  [address] [place] [address] 94449-
3791  09 Sep, 2016  Vaginal yeast infection B37.3

 

 [facility]  [address] [place] [address] 49899-
1497  08 Sep, 2016   

 

 [facility]  [address] [address]-
2915  30 Aug, 2016  Diabetes E11.9

 

 [facility]  [address] [address]-
8033  25 Aug, 2016  Anxiety disorder, unspecified F41.9

 

 [facility]  [address] [address]-
1450  17 Aug, 2016  Vaginal yeast infection B37.3

 

 [facility]  [address] [address]-
3662  17 Aug, 2016   

 

 [facility]  [address] [place] [address] 59332-
7521    Anxiety disorder, unspecified F41.9

 

 [facility]  [address] [address]-
9627    Vaginal yeast infection B37.3

 

 [facility]  [address] [place] [address] 856426-
1525    Anxiety disorder, unspecified F41.9

 

 [facility]  [address] [place] [address] 472505-
8165    Anxiety disorder, unspecified F41.9

 

 [facility]  [address] [place] [address] 93860-
5778  06 May, 2016  Anxiety disorder, unspecified F41.9

 

 [facility]  [id number] [place] [address] 14218-
4186  04 May, 2016  Diabetes E11.9

 

 [facility]  [address] [place] [address] 55819-
9178    Anxiety disorder, unspecified F41.9

 

 [facility]  [address] [place] [address] 54072-
2800     

 

 [facility]  [id number] [place] [address] 98662-
0885  25 Mar, 2016  Vaginal yeast infection B37.3

 

 [facility]  [address] [address]-
2203  15 Mar, 2016   

 

 [facility]  [address] [place] [address] 91725-
6001     

 

 [facility]  [address], [place] [address]-
3628     

 

 [facility]  [id number] [place] [address] 95058-
1298     

 

 [facility]  [address]-
0246  15 Andrei, 2016   

 

 [facility]  [id number] [place] [address] 94240-
8377  22 Dec, 2015   

 

 [facility]  [id number] [place] [address] 77844-
4821    Diabetes E11.9 ; Acquired hypothyroidism E03.9 ; 
Hyperlipidemia, unspecified hyperlipidemia E78.5 and Anxiety F41.9

 

 [facility]  [id number] [place] [address] 645314-
7616     

 

 [facility]  [address] [address]-
5754  27 Oct, 2015   

 

 [facility]  [id number] [place] [address] 247281-
2926  29 Sep, 2015   

 

 [facility]  [address] 773975-
2457  01 Sep, 2015   

 

 [facility]  [id number] [place] [address] 57868-
6295  04 Aug, 2015   

 

 [facility]  [id number] [place] [address] 38906-
6979  15 Jul, 2015   

 

 [facility]  [id number] [place] [address] 77744-
9429    DUB (dysfunctional uterine bleeding) 626.8 and Pap test, as 
part of routine gynecological examination V76.2

 

 [facility]  [address] [place] [address] 56516-
0105     

 

 [facility]  [id number] [place] [address] 69087-
6403     

 

 [facility]  [id number] [place] [address] 617758-
5715     

 

 [facility]  [address] 850596-
1784     

 

 [facility]  [address] [place] [address] 73118-
6211  15 Eagle, 2015  Diabetes 250.00

 

 [facility]  [address] [address]-
3867     

 

 [facility]  [address] [id number] [place] [address]-
3721  19 May, 2015   

 

 [facility]  [address] [facility] [id number] [place] [address]-
9887  13 May, 2015  Diabetes 250.00

 

 [facility]  [address] [id number] [place] [address]-
9046  12 May, 2015   

 

 [facility]  [address] [id number] [place] [address]-
8238  07 May, 2015   

 

 [facility]  [address] [id number] [place] [address]-
7480  07 May, 2015   

 

 [facility]  [address] [id number] [place] [address]-
0921  05 May, 2015   

 

 [facility]  [address] [facility] [id number] [place] [address]-
5745  01 May, 2015   

 

 [facility]  [address] [id number] [place] [address]-
5390     

 

 [facility]  [address] [id number] [place] [address]-
7159     

 

 [facility]  [address] [facility] [id number] [place] [address]-
7962     

 

 [facility]  [address] [id number] [place] [address]-
6209  17 Mar, 2015   

 

 [facility]  [address] [id number] [place] [address]-
8736  17 Mar, 2015   

 

 [facility] [place] [facility]  [address] [id number] [place] [address]-
3198  17 Mar, 2015   

 

 [facility]  [address] [id number] [place] [address]-
6406  17 Mar, 2015   

 

 [facility]  [address] [id number] [place] [address]-
8717  09 Mar, 2015   

 

 [facility] [place] [facility]  [address] [id number] [place] [address]-
3557  09 Mar, 2015   

 

 [facility] [place] [facility]  [address] [id number] [place] [address]-
0481  06 Mar,    

 

 [facility] [place] [facility]  [address] [id number] [place] [address]-
5498  03 Mar, 2015   

 

 [facility] [place] [facility]  [address] [id number] [place] [address]-
3231  03 Mar, 2015   

 

 [facility] [place] [facility]  [address] [id number] [place] [address]-
9852  ,    

 

 [facility] [place] [facility]  [address] [id number] [place] [address]-
3056  ,    

 

 [facility] [place] [facility]  [address] [id number] [place] [address]-
5470  ,    

 

 [facility] [place] [facility]  [address] [id number] [place] [address]-
0914  ,    

 

 [facility] [place] [facility]  [address] [id number] [place] [address]-
8734     

 

 [facility] [place] [facility]  [address] [id number] [place] [address]-
6336  ,    

 

 [facility] [place] [facility]  [address] [id number] [place] [address]-
2582  ,    

 

 [facility] [place] [facility]  [address] [id number] [place] [address]-
1896     

 

 [facility] [place] [facility]  [address] [id number] [place] [address]-
9493     

 

 [facility] [place] [facility]  [address] [id number] [place] [address]-
1411     

 

 [facility] [place] [facility]  [address] [id number] [place] [address]-
3414     

 

 [facility] [place] [facility]  [address] [id number] [place] [address]-
4584     

 

 [facility] [place] [facility]  [address] [id number] [place] [address]-
6158     

 

 [facility] [place] [facility]  [address] [id number] [place] [address]-
3325     

 

 [facility] [place] [facility]  [address] [id number] [place] [address]-
9955     

 

 [facility] [place] [facility]  [address] [id number] [place] [address]-
8211     

 

 [facility] [place] [facility]  [address] [id number] [place] [address]-
8405     

 

 [facility] [place] [facility]  [address] [id number] [place] [address]-
6699     

 

 [facility] [place] [facility]  [address] [id number] [place] [address]-
9699     

 

 [facility] [place] [facility]  [address] [id number] [place] [address]-
3269     

 

 [facility] [place] [facility]  [address] [id number] [place] [address]-
3539  23 Dec, 2014   

 

 [facility] [place] [facility]  [address] [id number] [place] [address]-
6153  23 Dec, 2014   

 

 [facility] [place] [facility]  [address] [id number] [place] [address]-
4498  22 Dec, 2014   

 

 [facility] [place] [facility]  [address] [id number] [place] [address]-
1154  22 Dec, 2014   

 

 [facility] [place] [facility]  [address] [id number] [place] [address]-
0060  17 Dec, 2014   

 

 [facility] [place] [facility]  [address] [id number] [place]
6962  17 Dec, 2014   

 

 [facility] [place] [facility]  [address] [id number] [place] [address]-
5732  15 Dec, 2014   

 

 [facility] [place] [facility]  [address] [id number] [place] [address]-
1289  15 Dec, 2014   

 

 [facility] [place] [facility]  [address] [id number] [place] [address]-
0117  12 Dec, 2014   

 

 [facility] [place] [facility]  [address] [id number] [place]
2386  08 Dec, 2014   

 

 [facility] [place] [facility]  [address] [id number] [place] [address]-
5267  08 Dec, 2014   

 

 [facility] [place] [facility]  [address] [id number] [place] [address]-
9224  08 Dec, 2014   

 

 [facility] [place] [facility]  [address] [id number] [place] [address]-
0859  08 Dec, 2014   

 

 [facility] [place] [facility]  [address] [id number] [place] [address]-
1065     

 

 [facility] [place] [facility]  [address] [id number] [place] [address]-
7354     

 

 [facility] [place] [facility]  [address] [id number] [place] [address]-
7688     

 

 [facility] [place] [facility]  [address] [id number] [place] [address]-
2836     

 

 [facility] [place] [facility]  [address] [id number] [place] [address]-
4952  10 Nov, 2014   

 

 [facility] [place] [facility]  [address] [id number] [place] [address]-
2532  10 Nov, 2014   

 

 [facility] [place] [facility]  [address] [id number] [place] [address]-
2359  07 Oct, 2014   

 

 [facility] [place] [facility]  [address] [id number] [place] [address]-
9371  07 Oct, 2014   

 

 [facility] [place] [facility]  [address] [id number] [place] [address]-
2059  01 Oct, 2014   

 

 [facility] [place] [facility]  [address] [id number] [place] [address]-
8014  01 Oct, 2014   

 

 [facility] [place] [facility]  [address] [id number] [place] [address]-
6175  24 Sep, 2014   

 

 [facility] [place] [facility]  [address] [id number] [place] [address]-
5186  24 Sep, 2014   

 

 [facility] [place] [facility]  [address] [id number] [place] [address]-
4653  23 Sep, 2014   

 

 [facility] [place] [facility]  [address] [id number] [place] [address]-
9668  23 Sep, 2014   

 

 [facility] [place] [facility]  [address] [id number] [place] [address]-
3113  22 Sep, 2014   

 

 [facility] [place] [facility]  [address] [id number] [place] [address]-
0946  22 Sep, 2014   

 

 [facility] [place] [facility]  [address] [id number] [place] [address]-
2243  19 Aug, 2014   

 

 [facility] [place] [facility]  [address] [id number] [place] [address]-
1695  19 Aug, 2014   

 

 [facility] [place] [facility]  [address] [id number] [place] [address]-
7485     

 

 [facility] [place] [facility]  [address] [id number] [place] [address]-
9022     

 

 [facility] [place] [facility]  [address] [id number] [place] [address]-
8062  10 Eagle, 2014   

 

 [facility] [place] [facility]  [address] [id number] [place] [address]-
6233     

 

 [facility] [place] [facility]  [address] [id number] [place] [address]-
8646     

 

 [facility] [place] [facility]  [address] [id number] [place]
1990  07 May, 2014   

 

 [facility] [place] [facility]  [address] [id number] [place]
0019  07 May, 2014   

 

 [facility] [place] [facility]  [address] [id number] [place] [address]-
3932  16 2014   

 

 [facility]  [address] [id number] [place] [address]-
2432     

 

 [facility] [place] [facility]  [address] [id number] [place] [address]-
2522     

 

 [facility]  [address] [id number] [place] [address]-
1006     

 

 [facility] [place] [facility]  [address] [id number] [place] [address]-
7710     

 

 [facility]  [address] [id number] [place] [address]-
8955     

 

 [facility] [place] [facility]  [address] [id number] [place] [address]-
7455     

 

 [facility] [place] [facility]  [address] [id number] [place] [address]-
8870     

 

 [facility]  [address] [id number] [place] [address]-
9404     

 

 [facility] [place] [facility]  [address] [id number] [place] [address]-
2507     

 

 [facility]  [address] [id number] [place] [address]-
6150  17 Mar, 2014   

 

 [facility] [place] [facility]  [address] [id number] [place] [address]-
3615  17 Mar, 2014   

 

 [facility] [place] [facility]  [address] [id number] [place] [address]-
3818     

 

 [facility] [place] [facility]  [address] [id number] [place] [address]-
2499     

 

 [facility] [place] [facility]  [address] [id number] [place] [address]-
6496     

 

 [facility] [place] [facility]  [address] [id number] [place] [address]-
1439     

 

 [facility]  [address] [id number] [place] [address]-
3323  25 Oct, 2013   

 

 [facility] [place] [facility]  [address] [id number] [place] [address]-
1229  25 Oct, 2013   

 

 [facility] [place] [facility]  [address] [id number] [place] [address]-
8331  23 Sep, 2013   

 

 [facility] [place] [facility]  [address] [id number] [place] [address]-
6841  06 Aug, 2013   

 

 [facility] [place] [facility]  [address] [id number] [place] [address]-
9980  05 Aug, 2013   

 

 [facility] [place] [facility]  [address] [id number] [place] [address]-
7159     

 

 [facility] [place] [facility]  [address] [id number] [place] [address]-
4585     

 

 [facility] [place] [facility]  [address] [id number] [place] [address]-
6253     

 

 [facility] [place] [facility]  [address] [id number] [place] [address]-
4427  28 Dec, 2012   

 

 [facility] [place] [facility]  [address] [id number] [place] [address]-
0449  19 Dec, 2012   

 

 [facility] [place] [facility]  [address] [id number] [address]-
3492  19 Dec, 2012   

 

 [facility] [place] [facility]  [address] [id number][place] [address]-
0276  13 Dec, 2012   

 

 [facility] [place] [facility]  [address] [id number] [place] [address]-
0356  13 Dec, 2012   

 

 [facility] [place] [facility]  [address] [id number][place] [address]-
2904     

 

 [facility] [place] [facility]  [address] [id number] [place] [address]
5515     

 

 [facility] [place] [facility]  [address] [id number] [place] [address]-
7452  19 Oct, 2012   

 

 [facility] [place] [facility]  [address] [id number] [place] [address]-
0346  18 Oct, 2012   

 

 [facility] [place] [facility]  [address] [id number] [place]
6016  17 Oct, 2012   

 

 [facility] [place] [facility]  [address] [id number] [place] [address]-
8428  17 Oct, 2012   

 

 [facility] [place] [facility]  [address] [id number] [place] [address]-
9714  16 Oct, 2012   

 

 [facility] [place] [facility]  [address] [id number] [place] [address]
4194  16 Oct, 2012   

 

 [facility] [place] [facility]  [address] [id number] [place] [address]-
6582  15 Oct, 2012   

 

 [facility] [place] [facility]  [address] [id number] [place] [address]-
3601  27 Sep, 2012   

 

 [facility] [place] [facility]  [address] [id number] [place] [address]-
4841  26 Sep, 2012   

 

 [facility] [place] [facility]  [address] [id number] [place] [address]-
4562     

 

 [facility] [place] [facility]  [address] [facility] [id number] [place] [address]-
6649     

 

 [facility] [place] [facility]  [address] [id number] [place] [address]-
0440     

 

 [facility] [place] [facility]  [address] [id number] [place] [address]-
1284     

 

 [facility] [place] [facility]  [address] [id number] [place] [address]-
8261     

 

 [facility] [place] [facility]  [address] [id number] [place] [address]-
9837     

 

 [facility] [place] [facility]  [address] [id number] [place] [address]-
9288     

 

 [facility] [place] [facility]  [address] [id number] [place] [address]-
6615     

 

 [facility] [place] [facility]  [address] [id number] [place] [address]-
1976  01 Mar, 2012   

 

 [facility] [place] [facility]  [address] [id number] [place] [address]-
2546     

 

 [facility] [place] [facility]  [address] [id number] [place] [address]-
8166     

 

 [facility]  [address] [id number] [place] [address]-
9409     

 

 [facility] [place] [facility]  [address] [id number] [place] [address]-
2224  10 Andrei, 2012   

 

 [facility]  [address] [id number] [place] [address]-
2886     

 

 [facility]  [address] [id number] [place] [address]-
8747  20 Dec, 2011   

 

 [facility] [place] [facility]  [address] [id number] [place] [address]-
3208  20 Dec, 2011   

 

 [facility] [place] [facility]  [address] [id number] [place] [address]-
6585  13 Dec, 2011   

 

 [facility] [place] [facility]  [address] [id number] [place] [address]-
6570  08 Dec, 2011   

 

 [facility] [place] [facility]  [address] [id number] [place] [address]-
2246  08 Dec, 2011   

 

 [facility] [place] [facility]  [address] [id number] [place] [address]-
7161  06 Dec, 2011   

 

 [facility] [place] [facility]  [address] [id number] [place] [address]-
3352  10 Oct, 2011   

 

 [facility] [place] [facility]  [address] [id number] [place] [address]-
1298  10 Oct, 2011   

 

 [facility] [place] [facility]  [address] [id number] [address]-
0916     

 

 [facility]  [address] [id number] [address]-
2902  20 Dec, 2010   

 

 [facility]  [address] [id number] [address]-
6376  20 Dec, 2010   

 

 [facility]  [address] [id number] [address]-
3141     

 

 [facility]  [id number], [place] [address]-
1476     

 

 [facility]  [address] [id number] [address]-
2956  15 Oct, 2009   

 

 [facility]  [id number], [place] [address]-
3428  15 Oct, 2009   

 

 [facility]  [address] [id number], [place] [address]-
9525     







IMMUNIZATIONS

No Known Immunizations



SOCIAL HISTORY

Never Assessed



REASON FOR VISIT

Controlled Med Refill 18



PLAN OF CARE





VITAL SIGNS





MEDICATIONS







 Medication  Instructions  Dosage  Frequency  Start Date  End Date  Duration  
Status

 

 Xanax 1 MG  Orally Once a day  1 tablet  24h       28 days  Active







RESULTS

No Results



PROCEDURES

No Known procedures



INSTRUCTIONS





MEDICATIONS ADMINISTERED

No Known Medications



MEDICAL (GENERAL) HISTORY







 Type  Description  Date

 

 Medical History  hypothyroidism   

 

 Medical History  type II diabetes   

 

 Medical History  back pain   

 

 Medical History  hyperlipidemia   

 

 Medical History  anxiety   

 

 Medical History  mood disorder   

 

 Medical History  heart murmur   

 

 Medical History  chronic bronchitis   

 

 Medical History  [place]   

 

 Surgical History  appendectomy   

 

 Surgical History  gallbladder removal   

 

 Hospitalization History  childbirth   

 

 Hospitalization History  pancreatitis  2005

 

 Hospitalization History  appendectomy   

 

 Hospitalization History  Anaphylaxis to Bactrim  2016

## 2019-01-30 NOTE — XMS REPORT
[facility]

 Created on: 2018



Libra Galvez

External Reference #: [id number]

: 1965

Sex: Female



Demographics







 Address  [address] [place] [address]  [id number]

 

 Preferred Language  Unknown

 

 Marital Status  Unknown

 

 Congregation Affiliation  Unknown

 

 Race  Unknown

 

 Ethnic Group  Unknown





Author







 Author  ANIKET MAYFIELD

 

 Organization  [facility]

 

 Address  [address] [place]  [address]



 

 Phone  [phone]







Care Team Providers







 Care Team Member Name  Role  Phone

 

 ANIKET MAYFIELD  Unavailable  [phone]







PROBLEMS







 Type  Condition  ICD9-CM Code  QOQ24-OX Code  Onset Dates  Condition Status  
SNOMED Code

 

 Problem  Anxiety disorder, unspecified     F41.9     Active  962453375

 

 Problem  Violation of controlled substance agreement     Z91.14     Active  
[id number]

 

 Problem  Intestinal malabsorption, unspecified     K90.9     Active  00734886

 

 Problem  Acquired hypothyroidism     E03.9     Active  623832654

 

 Problem  Diabetes     E11.9     Active  615037005

 

 Problem  Hypertriglyceridemia     E78.1     Active  352460943

 

 Problem  Status post cholecystectomy     Z90.49     Active  517408138







ALLERGIES

No Information



ENCOUNTERS







 Encounter  Location  Date  Diagnosis

 

 [facility]  [id number] [place] [address]-
9454    Intestinal malabsorption, unspecified K90.9 ; Diarrhea, 
unspecified R19.7 ; Diabetes E11.9 and Marijuana smoker F12.90

 

 [facility]  [address] [place] [address] 
[id number]    Dental examination Z01.20

 

 [facility]  [id number] [place] [address] 
[id number]  10 Jul, 2018  Dental examination Z01.20 and Dental caries K02.9

 

 [facility]  [address] [place] [address] 22808-
0798    Benzodiazepine use agreement exists Z02.89 ; Therapeutic 
drug monitoring Z51.81 and Violation of controlled substance agreement Z91.14

 

 [facility]  [address] [place] [address] 61609-
3127     

 

 [facility]  [id number] [place] [address] 87293-
4589    Ketoacidosis E87.2 ; Status post cholecystectomy Z90.49 ; 
Diabetes E11.9 ; Acquired hypothyroidism E03.9 ; Hypertriglyceridemia E78.1 and 
Vaginal yeast infection B37.3

 

 [facility]  [id number] [place] [address] 69744-
6723  15 May, 2018   

 

 [address] [address]-
7699     

 

 [facility]  [address] [address]-
7172  20 Mar, 2018   

 

 [address] [address]-
4968     

 

 [address] [address] [address]-
6838     

 

 [address] [place] [address] [address]-
8048  10 Andrei, 2018  Diabetes E11.9 and Drug-induced acute pancreatitis with 
uninfected necrosis K85.31

 

 [place] [id number] [place] [address] 76425-
9067  27 Dec, 2017   

 

 [address] [address] [address]-
0743     

 

 [facility] [place] [id number] [place] [address] [phone]
-[phone]  10 Nov, 2017  Crushing injury of right foot, initial encounter S97.81XA

 

 [place] [id number] [place] [address] 15311-
6635  27 Sep, 2017   

 

 [place] [address] [address]-
3441  21 Sep, 2017   

 

 [facility]  [id number] [place] [address] [phone]
-[phone]  19 Sep, 2017  Acute non-recurrent pansinusitis J01.40

 

 [place] [address] [address]-
1621  19 Sep, 2017   

 

 [facility]  [id number] [place] [address] 79105-
8344  04 Aug, 2017   

 

 [facility]  [id number] [place] [address] 83441-
3425     

 

 [facility]  [id number] [place] [address] [phone]
2712  26 May, 2017   

 

 [facility]  [id number] [place] [address] [phone]
1982  08 May, 2017  Diabetes E11.9 ; Anxiety disorder, unspecified F41.9 and 
Acquired hypothyroidism E03.9

 

 [facility]  [id number] [place] [address] 97703-
9114  01 May, 2017   

 

 [facility]  [id number] [place] [address] 88263-
4206     

 

 [facility]  [id number] [place] [address] 09122-
5484     

 

 [facility]  [id number] [place] [address] 83028-
4139  06 Mar, 2017   

 

 [facility]  [id number] [place] [address] 54509-
9695     

 

 [facility]  [id number] [place] [address] 52853-
0352     

 

 [facility]  [id number] [place] [address] 02828-
4466     

 

 [facility]  [id number] [place] [address] 20880-
7707     

 

 [facility]  [id number] [place] [address] 87772-
1126    Acute non-recurrent maxillary sinusitis J01.00

 

 [facility]  [id number] [place] [address] 09035-
7448     

 

 [facility]  [address] [address]-
1136  09 2017   

 

 [facility]  [id number] [place] [address] 44793-
6477  12 Dec, 2016   

 

 [facility]  [id number] [place] [address] 86348-
5573  15 Nov, 2016   

 

 [facility]  [address] [place] [address] 45832-
3487  12 Oct, 2016  Diabetes E11.9

 

 [facility]  [id number] [place] [address] 05697-
9901  28 Sep, 2016  Pelvic pain R10.2 ; Leukocytosis, unspecified D72.829 ; 
Constipation, unspecified constipation type K59.00 and History of pancreatitis 
Z87.19

 

 [facility]  [id number] [place] [address] 27697-
5616  22 Sep, 2016   

 

 [facility]  [id number] [place] [address] 89663-
3175  14 Sep, 2016  Diabetes E11.9

 

 [facility]  [id number] [place] [address] 82347-
2306  13 Sep, 2016   

 

 [facility]  [id number] [place] [address] 70171-
8934  09 Sep, 2016  Vaginal yeast infection B37.3

 

 [facility]  [address] [place] [address] 18865-
7457  08 Sep, 2016   

 

 [facility]  [id number] [place] [address] 49309-
3433  30 Aug, 2016  Diabetes E11.9

 

 [facility]  [id number] [place] [address] 82255-
5430  25 Aug, 2016  Anxiety disorder, unspecified F41.9

 

 [facility]  [address] [place] [address] 59199-
5131  17 Aug, 2016  Vaginal yeast infection B37.3

 

 [facility]  [id number] [place] [address] 66972-
5929  17 Aug, 2016   

 

 [facility]  [address] [address]-
9106    Anxiety disorder, unspecified F41.9

 

 [facility]  [address] 795495-
8006    Vaginal yeast infection B37.3

 

 [facility]  [id number] [place] [address] 550390-
2756    Anxiety disorder, unspecified F41.9

 

 [facility]  [address] [address]-
9294    Anxiety disorder, unspecified F41.9

 

 [facility]  [id number] [place] [address] 088780-
4862  06 May, 2016  Anxiety disorder, unspecified F41.9

 

 [facility]  [id number] [place] [address] 63948-
5955  04 May, 2016  Diabetes E11.9

 

 [facility]  [address] [address]-
3728    Anxiety disorder, unspecified F41.9

 

 [facility]  [address] [address]-
0055     

 

 [facility]  [address] [address]-
8265  25 Mar, 2016  Vaginal yeast infection B37.3

 

 [facility]  [address] [address]-
3507  15 Mar, 2016   

 

 [facility]  [address] 221049-
5290     

 

 [facility]  [address] 799[place]-
1814     

 

 [facility]  [address] [address]-
7590     

 

 [facility]  [id number] [place] [address] 33824-
0974  15 Andrei, 2016   

 

 [facility]  [id number] [place] [address] 46650-
5024  22 Dec, 2015   

 

 [facility]  [id number] [place] [address] 10669-
4921    Diabetes E11.9 ; Acquired hypothyroidism E03.9 ; 
Hyperlipidemia, unspecified hyperlipidemia E78.5 and Anxiety F41.9

 

 [facility]  [id number] [place] [address] 46623-
2431     

 

 [facility]  [address] [place] [address] 55007-
7538  27 Oct, 2015   

 

 [facility]  [id number] [place] [address] 68809-
6586  29 Sep, 2015   

 

 [facility]  [address] [place] [address] 76349-
2970  01 Sep, 2015   

 

 [facility]  [address] [place] [address] 45032-
2962  04 Aug, 2015   

 

 [facility]  [address] [place] [address] 57700-
4556  15 Jul, 2015   

 

 [facility]  [address] [place] [address] 00449-
6499    DUB (dysfunctional uterine bleeding) 626.8 and Pap test, as 
part of routine gynecological examination V76.2

 

 [facility]  [id number] [place] [address] 72918-
6597     

 

 [facility]  [address] [place] [address] 96108-
7426     

 

 [facility]  [address] [place] [address] 75146-
3819     

 

 [facility]  [address] [place] [address] 67646-
3097     

 

 [facility]  [address] [facility] [id number] [place] [address]-
4717  15 Eagle, 2015  Diabetes 250.00

 

 [facility]  [address] [id number] [place] [address]-
5390     

 

 [facility]  [address] [id number] [place] [address]-
6112  19 May, 2015   

 

 [facility]  [address] [id number] [place] [address]-
5790  13 May, 2015  Diabetes 250.00

 

 [facility]  [address] [id number] [place] [address]-
8418  12 May, 2015   

 

 [facility]  [address] [id number] [place] [address]-
0916  07 May, 2015   

 

 [facility]  [address] [id number] [place] [address]-
3698  07 May, 2015   

 

 [facility]  [address] [id number] [place] [address]-
7439  05 May, 2015   

 

 [facility]  [address] [id number] [place] [address]-
8299  01 May, 2015   

 

 [facility]  [address] [id number] [place] [address]-
1387     

 

 [facility]  [address] [id number] [place] [address]-
7028     

 

 [facility]  [address] [id number] [address]-
5269     

 

 [facility]  [address] [id number] [place] [address]-
2675  17 Mar, 2015   

 

 [facility]  [address] [id number] [place] [address]-
3396  17 Mar, 2015   

 

 [facility]  [address] [id number] [place] [address]-
2005  17 Mar, 2015   

 

 [facility]  [address] [id number] [address]-
5881  17 Mar, 2015   

 

 [facility] [place] [facility]  [address] [id number] [place] [address]-
3143  09 Mar,    

 

 [facility] [place] [facility]  [address] [id number] [place] [address]-
7506  09 Mar,    

 

 [facility] [place] [facility]  [address] [facility] [id number] [place] [address]-
6991  06 Mar,    

 

 [facility] [place] [facility]  [address] [id number] [place] [address]-
9203  03 Mar,    

 

 [facility] [place] [facility]  [address] [id number] [place] [address]-
0553  03 Mar, 2015   

 

 [facility] [place] [facility]  [address] [id number] [place] [address]-
3749  ,    

 

 [facility] [place] [facility]  [address] [facility] [id number] [place] [address]-
6322  ,    

 

 [facility] [place] [facility]  [address] [facility] [id number] [place] [address]-
6918  ,    

 

 [facility] [place] [facility]  [address] [facility] [id number] [place] [address]-
5084  ,    

 

 [facility] [place] [facility]  [address] [facility] [id number] [place] [address]-
6401  ,    

 

 [facility] [place] [facility]  [address] [facility] [id number] [place] [address]-
5403  ,    

 

 [facility] [place] [facility]  [address] [facility] [id number] [place] [address]-
5947  ,    

 

 [facility] [place] [facility]  [address] [id number] [place] [address]-
0078  ,    

 

 [facility] [place] [facility]  [address] [facility] [id number] [place] [address]-
4783     

 

 [facility] [place] [facility]  [address] [facility] [id number] [place] [address]-
8180     

 

 [facility] [place] [facility]  [address] [id number] [place] [address]-
4884     

 

 [facility] [place] [facility]  [address] [id number] [place] [address]-
4058     

 

 [facility] [place] [facility]  [address] [id number] [place] [address]-
0732     

 

 [facility] [place] [facility]  [address] [id number] [place] [address]-
1103     

 

 [facility] [place] [facility]  [address] [id number] [place] [address]-
2258     

 

 [facility] [place] [facility]  [address] [id number] [place] [address]-
1798     

 

 [facility] [place] [facility]  [address] [id number] [place] [address]-
7513     

 

 [facility] [place] [facility]  [address] [id number] [place] [address]-
3039     

 

 [facility] [place] [facility]  [address] [id number] [place] [address]-
1404     

 

 [facility] [place] [facility]  [address] [id number] [place] [address]-
2320     

 

 [facility] [place] [facility]  [address] [id number] [place] [address]-
7637  23 Dec, 2014   

 

 [facility] [place] [facility]  [address] [id number] [place] [address]-
1068  23 Dec, 2014   

 

 [facility] [place] [facility]  [address] [id number] [place] [address]-
6251  22 Dec, 2014   

 

 [facility] [place] [facility]  [address] [id number] [place] [address]-
1411  22 Dec, 2014   

 

 [facility] [place] [facility]  [address] [id number] [place] [address]-
7007  17 Dec, 2014   

 

 [facility] [place] [facility]  [address] [id number][place] [address]-
0983  17 Dec, 2014   

 

 [facility] [place] [facility]  [address] [id number] [place] [address]-
4289  15 Dec, 2014   

 

 [facility] [place] [facility]  [address] [id number] [place]-
6271  15 Dec, 2014   

 

 [facility] [place] [facility]  [address] [id number] [place] [address]-
2546  12 Dec, 2014   

 

 [facility] [place] [facility]  [address] [id number] [place] [address]-
1764  08 Dec, 2014   

 

 [facility] [place] [facility]  [address] [id number] [place] [address]-
9871  08 Dec, 2014   

 

 [facility] [place] [facility]  [address] [id number] [place] [address]-
0983  08 Dec, 2014   

 

 [facility] [place] [facility]  [address] [facility] [id number] [place] [address]-
5697  08 Dec, 2014   

 

 [facility] [place] [facility]  [address] [id number] [place] [address]-
1560     

 

 [facility] [place] [facility]  [address] [id number] [place] [address]-
5061     

 

 [facility] [place] [facility]  [address] [id number][place] [address]-
4318     

 

 [facility] [place] [facility]  [address] [id number][place] [address]-
2074     

 

 [facility] [place] [facility]  [address] [id number] [address]-
7013  10 Nov, 2014   

 

 [facility] [place] [facility]  [address] [id number] [address]-
2197  10 Nov, 2014   

 

 [facility] [place] [facility]  [address] [id number][place] [address]-
7135  07 Oct, 2014   

 

 [facility] [place] [facility]  [address] [id number] [place] [address]-
7923  07 Oct, 2014   

 

 [facility] [place] [facility]  [address] [id number] [place] [address]-
4238  01 Oct, 2014   

 

 [facility] [place] [facility]  [address] [id number] [place] [address]-
6930  01 Oct, 2014   

 

 [facility] [place] [facility]  [address] [id number] [place] [address]-
2162  24 Sep, 2014   

 

 [facility] [place] [facility]  [address] [id number] [place] [address]-
7199  24 Sep, 2014   

 

 [facility] [place] [facility]  [address] [id number] [place] [address]-
1720  23 Sep, 2014   

 

 [facility] [place] [facility]  [address] [id number] [place] [address]-
8586  23 Sep, 2014   

 

 [facility] [place] [facility]  [address] [id number] [place] [address]-
2130  22 Sep, 2014   

 

 [facility] [place] [facility]  [address] [id number] [place] [address]-
3983  22 Sep, 2014   

 

 [facility] [place] [facility]  [address] [id number] [place] [address]-
1161  19 Aug, 2014   

 

 [facility] [place] [facility]  [address] [id number] [place] [address]-
3551  19 Aug, 2014   

 

 [facility] [place] [facility]  [address] [id number] [place] [address]-
5246     

 

 [facility] [place] [facility]  [address] [id number] [place] [address]-
7055     

 

 [facility] [place] [facility]  [address] [id number] [place] [address]-
2433  10 Eagle, 2014   

 

 [facility] [place] [facility]  [address] [id number] [place] [address]-
1249     

 

 [facility] [place] [facility]  [address] [id number] [place] [address]-
4696     

 

 [facility] [place] [facility]  [address] [id number] [place] [address]-
3262  07 May, 2014   

 

 [facility] [place] [facility]  [address] [id number] [place] [address]-
5032  07 May, 2014   

 

 [facility] [place] [facility]  [address] [id number] [place] [address]-
7335     

 

 [facility] [place] [facility]  [address] [id number] [place]
5142     

 

 [facility] [place] [facility]  [address] [id number] [place] [address]-
5037     

 

 [facility] [place] [facility]  [address] [id number] [place] [address]-
2056     

 

 [facility] [place] [facility]  [address] [id number] [place] [address]-
9808     

 

 [facility] [place] [facility]  [address] [id number] [place] [address]-
4199     

 

 [facility] [place] [facility]  [address] [id number] [place] [address]-
1513     

 

 [facility] [place] [facility]  [address] [id number] [place] [address]-
7303     

 

 [facility] [place] [facility]  [address] [id number] [place] [address]-
9669     

 

 [facility] [place] [facility]  [address] [id number] [place] [address]-
7144     

 

 [facility] [place] [facility]  [address] [id number], [place] [address]-
4844  17 Mar, 2014   

 

 [facility] [place] [facility]  [address] [id number] [place] [address]-
7968  17 Mar, 2014   

 

 [facility] [place] [facility]  [address] [id number] [place] [address]-
7673     

 

 [facility] [place] [facility]  [address] [id number] [place] [address]-
5352     

 

 [facility] [place] [facility]  [address] [id number] [place] [address]-
5511     

 

 [facility]  [address] [id number] [place] [address]-
3743     

 

 [facility] [place] [facility]  [address] [id number] [place]
7895  25 Oct, 2013   

 

 [facility]  [address] [id number] [place] [address]-
4665  25 Oct, 2013   

 

 [facility] [place] [facility]  [address] [id number] [place] [address]-
3823  23 Sep, 2013   

 

 [facility]  [address] [id number] [place] [address]-
1915  06 Aug, 2013   

 

 [facility] [place] [facility]  [address] [id number] [place] [address]-
5727  05 Aug, 2013   

 

 [facility]  [address] [id number] [place] [address]-
1041     

 

 [facility]  [address] [id number] [place] [address]-
8920     

 

 [facility] [place] [facility]  [address] [id number] [place] [address]-
7648     

 

 [facility]  [address] [id number] [place] [address]-
0839  28 Dec, 2012   

 

 [facility] [place] [facility]  [address] [id number] [place] [address]-
4296  19 Dec, 2012   

 

 [facility] [place] [facility]  [address] [id number] [place] [address]-
8402  19 Dec, 2012   

 

 [facility] [place] [facility]  [address] [id number] [place] [address]-
6008  13 Dec, 2012   

 

 [facility] [place] [facility]  [address] [id number] [place] [address]-
5939  13 Dec, 2012   

 

 [facility] [place] [facility]  [address] [id number] [place] [address]-
[address]6     

 

 [facility] [place] [facility]  [address] [id number] [place] [address]-
5718     

 

 [facility] [place] [facility]  [address] [id number] [place] [address]-
4292  19 Oct, 2012   

 

 [facility] [place] [facility]  [address] [id number] [place] [address]-
3335  18 Oct, 2012   

 

 [facility] [place] [facility]  [address] [id number] [place] [address]-
7805  17 Oct, 2012   

 

 [facility] [place] [facility]  [address] [id number] [place] [address]-
8317  17 Oct, 2012   

 

 [facility] [place] [facility]  [address] [id number] [place] [address]-
3376  16 Oct, 2012   

 

 [facility] [place] [facility]  [address] [id number] [place] [address]-
7106  16 Oct, 2012   

 

 [facility] [place] [facility]  [address] [id number] [place] [address]-
1896  15 Oct, 2012   

 

 [facility] [place] [facility]  [address] [id number] [place] [address]-
8880  27 Sep, 2012   

 

 [facility] [place] [facility]  [address] [id number] [place] [address]-
2124  26 Sep, 2012   

 

 [facility] [place] [facility]  [address] [id number] [place] [address]-
6710     

 

 [facility] [place] [facility]  [address] [address] [id number] [address]-
[address]     

 

 [facility] [place] [facility]  [address] [id number] [place] [address]-
8515     

 

 [facility] [place] [facility]  [address] [id number] [place] [address]-
6310     

 

 [facility] [place] [facility]  [address] [id number] [place] [address]-
5753     

 

 [facility] [place] [facility]  [address] [id number], [place] [address]-
2796     

 

 [facility]  [address] [id number] [place] [address]-
7035     

 

 [facility] [place] [facility]  [address] [id number] [place] [address]-
7626     

 

 [facility] [place] [facility]  [address] [id number] [place] [address]
2546  01 Mar, 2012   

 

 [facility] [place] [facility]  [address] [id number] [place] [address]-
5776     

 

 [facility] [place] [facility]  [address] [id number] [place] [address]-
0923     

 

 [facility] [place] [facility]  [address] [id number] [place] [address]-
0766     

 

 [facility]  [address] [id number] [place] [address]-
7551  10 Andrei, 2012   

 

 [facility] [place] [facility]  [address] [id number] [place] [address]-
2217     

 

 [facility]  [address] [id number] [place] [address]-
4688  20 Dec, 2011   

 

 [facility] [place] [facility]  [address] [id number] [place] [address]-
1478  20 Dec, 2011   

 

 [facility] [place] [facility]  [address] [id number], [place] [address]-
[address]  13 Dec, 2011   

 

 [facility] [place] [facility]  [address] [id number], [place] [address]-
4060  08 Dec, 2011   

 

 [facility] [place] [facility]  [address] [id number] [place] [address]-
4713  08 Dec, 2011   

 

 [facility] [place] [facility]  [address] [id number] [place] [address]-
9316  06 Dec, 2011   

 

 [facility] [place] [facility]  [address] [id number] [place] [address]-
5342  10 Oct, 2011   

 

 [facility] [place] [facility]  [address] [id number], [place] [address]-
1446  10 Oct, 2011   

 

 [facility]  [address] [id number], [place] [address]-
1536     

 

 [facility]  [id number], [place] [address]
7419  20 Dec, 2010   

 

 [facility]  [address] [id number], [place] [address]-
4052  20 Dec, 2010   

 

 [facility]  [id number], [place] [address]-
5300     

 

 [facility]  [id number], [place] [address]-
7979     

 

 [facility]  [id number], [place] [address]-
5816  15 Oct, 2009   

 

 [facility]  [id number], [place] [address]-
9249  15 Oct, 2009   

 

 [facility]  [address] [id number], [place] [address]-
6645     







IMMUNIZATIONS

No Known Immunizations



SOCIAL HISTORY

Never Assessed



REASON FOR VISIT

Controlled Med Refill 18



PLAN OF CARE





VITAL SIGNS





MEDICATIONS







 Medication  Instructions  Dosage  Frequency  Start Date  End Date  Duration  
Status

 

 Xanax 1 MG  Orally Once a day  1 tablet  24h       28 days  Active







RESULTS

No Results



PROCEDURES

No Known procedures



INSTRUCTIONS





MEDICATIONS ADMINISTERED

No Known Medications



MEDICAL (GENERAL) HISTORY







 Type  Description  Date

 

 Medical History  hypothyroidism   

 

 Medical History  type II diabetes   

 

 Medical History  back pain   

 

 Medical History  hyperlipidemia   

 

 Medical History  anxiety   

 

 Medical History  mood disorder   

 

 Medical History  heart murmur   

 

 Medical History  chronic bronchitis   

 

 Medical History  [place]   

 

 Surgical History  appendectomy   

 

 Surgical History  gallbladder removal   

 

 Hospitalization History  childbirth   

 

 Hospitalization History  pancreatitis  2005

 

 Hospitalization History  appendectomy   

 

 Hospitalization History  Anaphylaxis to Bactrim  2016

## 2019-01-30 NOTE — XMS REPORT
[facility]

 Created on: 2017



Libra Galvez

External Reference #: [id number]

: 1965

Sex: Female



Demographics







 Address  [address]  [address]

 

 Preferred Language  Unknown

 

 Marital Status  Unknown

 

 Congregational Affiliation  Unknown

 

 Race  Unknown

 

 Ethnic Group  Unknown





Author







 Author  BULMARO OVALLE

 

 Organization  [facility]

 

 Address  [address] [place]  [address]



 

 Phone  [phone]







Care Team Providers







 Care Team Member Name  Role  Phone

 

 BULMARO OVALLE  Unavailable  [phone]







PROBLEMS







 Type  Condition  ICD9-CM Code  LMF41-JW Code  Onset Dates  Condition Status  
SNOMED Code

 

 Problem  Acquired hypothyroidism     E03.9     Active  280753242

 

 Problem  Diabetes     E11.9     Active  807791631

 

 Problem  Anxiety disorder, unspecified     F41.9     Active  [id number]







ALLERGIES







 Substance  Reaction  Event Type  Date  Status

 

 Sulfamethoxazole-Trimethoprim  anaphylaxis  Drug Allergy    Active







SOCIAL HISTORY

Never Assessed



PLAN OF CARE







 Activity  Details









  









 Follow Up  if not improving with PCP or reg follow up Reason:







VITAL SIGNS







 Height  62 in  2017

 

 Weight  150 lbs  2017

 

 Temperature  98.3 degrees Fahrenheit  2017

 

 Heart Rate  88 bpm  2017

 

 Respiratory Rate  18   2017

 

 Oximetry  98 %  2017

 

 BMI  27.43 kg/m2  2017

 

 Blood pressure systolic  124 mmHg  2017

 

 Blood pressure diastolic  76 mmHg  2017







MEDICATIONS







 Medication  Instructions  Dosage  Frequency  Start Date  End Date  Duration  
Status

 

 Ibuprofen 800 MG  Orally Once a day  1 tablet  24h           Active

 

 Azithromycin 250 MG  Orally Once a day  2 tablets  on the first day, then 1 
tablet daily for 4 days  24h    5 day(s)  Active

 

 Amaryl 2 MG     TAKE TWO TABLETS BY MOUTH TWICE DAILY           30  Active

 

 Cinnamon 500 MG  Orally 2 times a day  2 capsules  12h           Active

 

 Potassium 99 MG  Orally Once a day  1 tablet  24h           Active

 

 MetFORMIN HCl  MG     TAKE TWO TABLETS BY MOUTH TWICE DAILY WITH MEALS.  
MUST BE TEVA BRAND           30  Active

 

 Xanax 1 MG  Orally Once a day  1 tablet  24h       28 days  Active







RESULTS

No Results



PROCEDURES







 Procedure  Date Ordered  Result  Body Site

 

 MEASURE BLOOD OXYGEN LEVEL  2017      

 

 THER/PROPH/DIAG INJ, SC/IM  2017      

 

 ROCEPHIN 1 GM (IM)  2017      







IMMUNIZATIONS







 Vaccine  Route  Administration Date  Status

 

 ROCEPHIN 1 GM (IM)  IM Intramuscular  2017  Administered







MEDICAL (GENERAL) HISTORY







 Type  Description  Date

 

 Medical History  hypothyroidism   

 

 Medical History  type II diabetes   

 

 Medical History  back pain   

 

 Medical History  hyperlipidemia   

 

 Medical History  anxiety   

 

 Medical History  mood disorder   

 

 Medical History  heart murmur   

 

 Medical History  chronic bronchitis   

 

 Medical History  [place]   

 

 Surgical History  appendectomy   

 

 Hospitalization History  childbirth   

 

 Hospitalization History  pancreatitis  2005

 

 Hospitalization History  appendectomy   

 

 Hospitalization History  Anaphylaxis to Bactrim  2016

## 2019-01-30 NOTE — XMS REPORT
[facility]

 Created on: 2018



Libra Galvez

External Reference #: [id number]

: 1965

Sex: Female



Demographics







 Address  [address]  [address]

 

 Preferred Language  Unknown

 

 Marital Status  Unknown

 

 Latter-day Affiliation  Unknown

 

 Race  Unknown

 

 Ethnic Group  Unknown





Author







 Author  ANIKET MAYFIELD

 

 [facility]

 

 Address  [address] [place]  [address]



 

 Phone  [phone]







Care Team Providers







 Care Team Member Name  Role  Phone

 

 ANIKET MAYFIELD  Unavailable  [phone]







PROBLEMS







 Type  Condition  ICD9-CM Code  LCV73-SD Code  Onset Dates  Condition Status  
SNOMED Code

 

 Problem  Acquired hypothyroidism     E03.9     Active  938537100

 

 Problem  Diabetes     E11.9     Active  503819878

 

 Problem  Anxiety disorder, unspecified     F41.9     Active  [id number]







ALLERGIES

No Information



ENCOUNTERS







 Encounter  Location  Date  Diagnosis

 

 [place] [address] [address]-
4930  20 Mar, 2018   

 

 [facility]  [address] [address]-
6284     

 

 [facility]  [address] [address]-
1803     

 

 [facility]  [address] [address]-
5269  10 Andrei, 2018  Diabetes E11.9 and Drug-induced acute pancreatitis with 
uninfected necrosis K85.31

 

 [facility]  [id number] [place] [address]-
7884  27 Dec, 2017   

 

 [facility]  [address] [address]-
3940     

 

 [facility]  [address] [address]
-[address]  10 Nov, 2017  Crushing injury of right foot, initial encounter S97.81XA

 

 [facility]  [id number] [place] [address]-
9526  27 Sep, 2017   

 

 [facility]  [address] [address]-
5737  21 Sep, 2017   

 

 [facility]  [id number] [address]
-[address]  19 Sep, 2017  Acute non-recurrent pansinusitis J01.40

 

 [facility]  [address]00565[place], [place] [address]-
0885  19 Sep, 2017   

 

 [facility]  [id number] [place] [address]-
6120  04 Aug, 2017   

 

 [facility]  [id number] [place] [address]-
1375     

 

 [facility]  [id number] [place] [address]-
4546  26 May, 2017   

 

 [facility]  [id number] [place] [address]84-
2364  08 May, 2017  Diabetes E11.9 ; Anxiety disorder, unspecified F41.9 and 
Acquired hypothyroidism E03.9

 

 [facility]  [address]00565100[place] [address]-
3303  01 May, 2017   

 

 [facility]  [id number] [place] [address]-
9494     

 

 [facility]  [address] [id number] [address]-
8594     

 

 [facility]  [address]65100[place] [address]-
2520  06 Mar, 2017   

 

 [facility]  [id number] [address]-
1675     

 

 [facility]  [address]65100[place], [place] [address]-
6414     

 

 [facility]  [address]00565100[place], [place] [address]-
3330     

 

 [facility]  [address]65100[place] [address]-
3695     

 

 [facility]  [id number] [place] [address]-
5111    Acute non-recurrent maxillary sinusitis J01.00

 

 [facility]  [id number] [place] [address] 79[address]-
2298     

 

 [facility]  [id number] [place] [address]-
3519     

 

 [facility]  [id number] [place] [address] 36847-
8664  12 Dec, 2016   

 

 [facility]  [id number] [place] [address] 19478-
4544  15 Nov, 2016   

 

 [facility]  [id number] [place] [address]-
9063  12 Oct, 2016  Diabetes E11.9

 

 [facility]  [id number] [place] [address]-
3042  28 Sep, 2016  Pelvic pain R10.2 ; Leukocytosis, unspecified D72.829 ; 
Constipation, unspecified constipation type K59.00 and History of pancreatitis 
Z87.19

 

 [facility]  [id number] [place] [address]-
7575  22 Sep, 2016   

 

 [facility]  [id number] [place] [address] 30958-
4652  14 Sep, 2016  Diabetes E11.9

 

 [facility]  [id number] [place] [address]-
8893  13 Sep, 2016   

 

 [facility]  [id number] [place] [address] 80270-
8656  09 Sep, 2016  Vaginal yeast infection B37.3

 

 [facility]  [id number] [place] [address] 28549-
9881  08 Sep, 2016   

 

 [facility]  [id number] [place] [address] 6[address]-
5421  30 Aug, 2016  Diabetes E11.9

 

 [facility]  [id number] [place] [address] 43854-
5842  25 Aug, 2016  Anxiety disorder, unspecified F41.9

 

 [facility]  [address]65100[place] [address]-
3771  17 Aug, 2016  Vaginal yeast infection B37.3

 

 [facility]  [address] [id number] [address]-
8296  17 Aug, 2016   

 

 [facility]  [id number] [place] [address] 40[address]-
0496    Anxiety disorder, unspecified F41.9

 

 [facility]  [id number] [place] [address] 28977-
6303    Vaginal yeast infection B37.3

 

 [facility]  [address] [place] [address] 528135-
0046    Anxiety disorder, unspecified F41.9

 

 [facility]  [id number] [place] [address] 85443-
1452    Anxiety disorder, unspecified F41.9

 

 [facility]  [address] [place] [address] 88028-
8420  06 May, 2016  Anxiety disorder, unspecified F41.9

 

 [facility]  [address] [place] [address] 7[address]-
8481  04 May, 2016  Diabetes E11.9

 

 [facility]  [address]00565100[place] [address]-
4848    Anxiety disorder, unspecified F41.9

 

 [facility]  [address]00565100[place], [place] [address]-
7822     

 

 [facility]  [address] [place] [address] 15[address]-
5052  25 Mar, 2016  Vaginal yeast infection B37.3

 

 [facility]  [address] [id number] [address]-
5475  15 Mar, 2016   

 

 [facility]  [address] [place] [address] 0[address]-
2955     

 

 [facility]  [address]00565100[place], [place] [address]-
2869     

 

 [facility]  [id number] [place] [address] 19103-
6054     

 

 [facility]  [id number] [place] [address] 354706-
7664  15 Andrei, 2016   

 

 [facility]  [id number] [place] [address] 24407-
4412  22 Dec, 2015   

 

 [facility]  [id number] [place] [address] 94552-
3803    Diabetes E11.9 ; Acquired hypothyroidism E03.9 ; 
Hyperlipidemia, unspecified hyperlipidemia E78.5 and Anxiety F41.9

 

 [facility]  [id number] [place] [address] 52841-
8089     

 

 [facility]  [id number] [place] [address] 60232-
9059  27 Oct, 2015   

 

 [facility]  [address] [place] [address] 58727-
1756  29 Sep, 2015   

 

 [facility]  [id number] [place] [address] 849535-
1382  01 Sep, 2015   

 

 [facility]  [address]65[address] [address]-
3434  04 Aug, 2015   

 

 [facility]  [id number] [place] [address] 36699-
4968  15 Jul, 2015   

 

 [facility]  [address] [place] [address] 6[address]-
9817    DUB (dysfunctional uterine bleeding) 626.8 and Pap test, as 
part of routine gynecological examination V76.2

 

 [facility]  [address]65100[place] [address]-
8266     

 

 [facility]  [id number] [place] [address] 12534-
4523     

 

 [facility]  [address] [id number] [place] [address]-
2116     

 

 [facility]  [address] [id number] [place] [address]-
7577     

 

 [facility]  [address] [facility] [id number] [place] [address]-
7373  15 Eagle, 2015  Diabetes 250.00

 

 [facility]  [address] [id number] [place] [address]-
6607     

 

 [facility]  [address] [id number] [place] [address]-
9643  19 May, 2015   

 

 [facility]  [address] [id number] [place] [address]-
7224  13 May, 2015  Diabetes 250.00

 

 [facility]  [address] [id number] [place] [address]-
6138  12 May, 2015   

 

 [facility]  [address] [id number] [place] [address]-
0047  07 May, 2015   

 

 [facility]  [address] [id number] [place] [address]-
5424  07 May, 2015   

 

 [facility]  [address] [facility] [id number] [place] [address]-
6043  05 May, 2015   

 

 [facility]  [address] [id number] [place] [address]-
2286  01 May, 2015   

 

 [facility]  [address] [id number] [place] [address]-
8034     

 

 [facility]  [address] [id number] [place] [address]-
5752     

 

 [facility]  [address] [id number] [place] [address]-
3923     

 

 [facility]  [address] [id number] [place] [address]-
8937  17 Mar, 2015   

 

 [facility] [place] [facility]  [address] [id number] [place] [address]-
3225  17 Mar,    

 

 [facility] [place] [facility]  [address] [id number] [place] [address]-
0534  17 Mar,    

 

 [facility] [place] [facility]  [address] [id number] [place] [address]-
0503  17 Mar,    

 

 [facility] [place] [facility]  [address] [id number] [place] [address]-
8975  09 Mar,    

 

 [facility] [place] [facility]  [address] [id number] [place] [address]-
6153  09 Mar,    

 

 [facility] [place] [facility]  [address] [id number] [place] [address]-
0536  06 Mar,    

 

 [facility] [place] [facility]  [address] [facility] [id number] [place] [address]-
7617  03 Mar,    

 

 [facility] [place] [facility]  [address] [id number] [place] [address]-
3890  03 Mar,    

 

 [facility] [place] [facility]  [address] [id number] [place] [address]-
4466  ,    

 

 [facility] [place] [facility]  [address] [facility] [id number] [place] [address]-
8770  ,    

 

 [facility] [place] [facility]  [address] [facility] [id number] [place] [address]-
8221  ,    

 

 [facility] [place] [facility]  [address] [facility] [id number] [address]-
7620  ,    

 

 [facility] [place] [facility]  [address] [facility] [id number] [place] [address]-
1364  ,    

 

 [facility] [place] [facility]  [address] [id number] [place] [address]-
9767  ,    

 

 [facility] [place] [facility]  [address] [facility] [id number] [place] [address]-
2822  ,    

 

 [facility] [place] [facility]  [address] [facility] [id number] [address]-
3865     

 

 [facility] [place] [facility]  [address] [id number] [place] [address]-
2321     

 

 [facility] [place] [facility]  [address] [id number] [place] [address]-
8270     

 

 [facility] [place] [facility]  [address] [id number] [place] [address]-
2261     

 

 [facility] [place] [facility]  [address] [id number] [place] [address]-
5348     

 

 [facility] [place] [facility]  [address] [id number] [place] [address]-
9402     

 

 [facility] [place] [facility]  [address] [id number] [place] [address]-
6092     

 

 [facility] [place] [facility]  [address] [id number] [place] [address]-
2084     

 

 [facility] [place] [facility]  [address] [id number] [place] [address]-
8292     

 

 [facility] [place] [facility]  [address] [id number] [place] [address]-
9837     

 

 [facility] [place] [facility]  [address] [id number] [place] [address]-
6746     

 

 [facility] [place] [facility]  [address] [id number] [place] [address]-
3496     

 

 [facility] [place] [facility]  [address] [id number] [place] [address]-
8344     

 

 [facility] [place] [facility]  [address] [id number] [place] [address]-
4329  23 Dec, 2014   

 

 [facility] [place] [facility]  [address] [id number] [place] [address]-
5491  23 Dec, 2014   

 

 [facility] [place] [facility]  [address] [id number] [place] [address]-
1609  22 Dec, 2014   

 

 [facility] [place] [facility]  [address] [id number] [place] [address]-
8152  22 Dec, 2014   

 

 [facility] [place] [facility]  [address] [id number] [place]
8406  17 Dec, 2014   

 

 [facility] [place] [facility]  [address] [id number] [place]
6376  17 Dec, 2014   

 

 [facility] [place] [facility]  [address] [id number] [place] [address]-
9616  15 Dec, 2014   

 

 [facility] [place] [facility]  [address] [id number] [place] [address]-
9629  15 Dec, 2014   

 

 [facility] [place] [facility]  [address] [id number] [place]
8657  12 Dec, 2014   

 

 [facility] [place] [facility]  [address] [id number] [place] [address]-
0693  08 Dec, 2014   

 

 [facility] [place] [facility]  [address] [id number] [place] [address]-
5868  08 Dec, 2014   

 

 [facility] [place] [facility]  [address] [id number] [place] [address]-
7722  08 Dec, 2014   

 

 [facility] [place] [facility]  [address] [id number] [place] [address]-
1710  08 Dec, 2014   

 

 [facility] [place] [facility]  [address] [id number] [place] [address]-
5162     

 

 [facility] [place] [facility]  [address] [id number] [place] [address]-
1404     

 

 [facility] [place] [facility]  [address] [id number] [place] [address]-
1408     

 

 [facility] [place] [facility]  [address] [id number] [place] [address]-
3300     

 

 [facility] [place] [facility]  [address] [id number] [place] [address]-
8301  10 Nov, 2014   

 

 [facility] [place] [facility]  [address] [id number] [place] [address]-
6237  10 Nov, 2014   

 

 [facility] [place] [facility]  [address] [id number] [place] [address]-
2047  07 Oct, 2014   

 

 [facility] [place] [facility]  [address] [id number] [place] [address]-
5959  07 Oct, 2014   

 

 [facility] [place] [facility]  [address] [id number] [place] [address]-
7159  01 Oct, 2014   

 

 [facility] [place] [facility]  [address] [id number] [place] [address]-
1337  01 Oct, 2014   

 

 [facility] [place] [facility]  [address] [id number] [place] [address]-
1154  24 Sep, 2014   

 

 [facility] [place] [facility]  [address] [id number] [place] [address]-
7838  24 Sep, 2014   

 

 [facility] [place] [facility]  [address] [id number] [place] [address]-
5987  23 Sep, 2014   

 

 [facility] [place] [facility]  [address] [id number] [place] [address]-
7824  23 Sep, 2014   

 

 [facility] [place] [facility]  [address] [id number] [place] [address]-
8641  22 Sep, 2014   

 

 [facility] [place] [facility]  [address] [id number] [place] [address]-
7156  22 Sep, 2014   

 

 [facility] [place] [facility]  [address] [id number] [place] [address]-
4853  19 Aug, 2014   

 

 [facility] [place] [facility]  [address] [id number] [address]-
2765  19 Aug, 2014   

 

 [facility] [place] [facility]  [address] [id number] [place] [address]-
2590     

 

 [facility] [place] [facility]  [address] [id number] [place] [address]-
0175     

 

 [facility] [place] [facility]  [address] [id number] [place] [address]-
9897  10 Eagle, 2014   

 

 [facility] [place] [facility]  [address] [id number] [place] [address]-
0348     

 

 [facility] [place] [facility]  [address] [id number] [place] [address]-
1928     

 

 [facility] [place] [facility]  [address] [id number] [place] [address]-
2715  07 May, 2014   

 

 [facility] [place] [facility]  [address] [id number] [place] [address]-
5784  07 May, 2014   

 

 [facility] [place] [facility]  [address] [id number] [place] [address]-
2456     

 

 [facility] [place] [facility]  [address] [id number] [place] [address]-
1514     

 

 [facility] [place] [facility]  [address] [id number] [place] [address]-
5791     

 

 [facility] [place] [facility]  [address] [id number] [place] [address]-
7238     

 

 [facility] [place] [facility]  [address] [id number] [place] [address]-
0407     

 

 [facility] [place] [facility]  [address] [id number] [place] [address]-
8953     

 

 [facility] [place] [facility]  [address] [id number] [place] [address]-
4097     

 

 [facility] [place] [facility]  [address] [id number] [place] [address]-
1695     

 

 [facility] [place] [facility]  [address] [id number] [place] [address]-
5345     

 

 [facility] [place] [facility]  [address] [id number] [place] [address]-
8975     

 

 [facility] [place] [facility]  [address] [id number] [place] [address]-
3223  17 Mar, 2014   

 

 [facility] [place] [facility]  [address] [id number] [place] [address]-
3483  17 Mar, 2014   

 

 [facility] [place] [facility]  [address] [id number] [place] [address]-
8614     

 

 [facility]  [address] [id number] [place] [address]-
7696     

 

 [facility]  [address] [id number] [place] [address]-
5165     

 

 [facility] [place] [facility]  [address] [id number] [place] [address]-
7920     

 

 [facility]  [address] [id number] [place] [address]-
2476  25 Oct, 2013   

 

 [facility] [place] [facility]  [address] [id number] [place] [address]-
0966  25 Oct, 2013   

 

 [facility]  [address] [id number] [place] [address]-
8752  23 Sep, 2013   

 

 [facility]  [address] [id number] [place] [address]-
4276  06 Aug, 2013   

 

 [facility]  [address] [id number] [place] [address]-
6371  05 Aug, 2013   

 

 [facility]  [address] [id number] [place] [address]-
3644     

 

 [facility] [place] [facility]  [address] [id number] [place] [address]-
4507     

 

 [facility] [place] [facility]  [address] [id number] [place] [address]-
4342     

 

 [facility] [place] [facility]  [address] [id number] [place] [address]-
0991  28 Dec, 2012   

 

 [facility] [place] [facility]  [address] [id number] [place] [address]-
2116  19 Dec, 2012   

 

 [facility] [place] [facility]  [address] [id number] [place] [address]-
2546  19 Dec, 2012   

 

 [facility] [place] [facility]  [address] [id number] [place] [address]-
3008  13 Dec, 2012   

 

 [facility] [place] [facility]  [address] [id number] [place] [address]-
5096  13 Dec, 2012   

 

 [facility] [place] [facility]  [address] [id number] [place]
4770     

 

 [facility] [place] [facility]  [address] [id number] [place] [address]-
0144     

 

 [facility] [place] [facility]  [address] [id number] [place] [address]-
2173  19 Oct, 2012   

 

 [facility] [place] [facility]  [address] [id number] [place] [address]-
9031  18 Oct, 2012   

 

 [facility] [place] [facility]  [address] [id number] [place] [address]-
2488  17 Oct, 2012   

 

 [facility] [place] [facility]  [address] [id number] [place] [address]-
9068  17 Oct, 2012   

 

 [facility] [place] [facility]  [address] [id number] [place] [address]-
6719  16 Oct, 2012   

 

 [facility] [place] [facility]  [address] [id number] [place] [address]-
2820  16 Oct, 2012   

 

 [facility] [place] [facility]  [address] [id number] [place] [address]-
1722  15 Oct, 2012   

 

 [facility] [place] [facility]  [address] [id number] [address]-
7097  27 Sep, 2012   

 

 [facility] [place] [facility]  [address] [id number] [place] [address]-
2640  26 Sep, 2012   

 

 [facility] [place] [facility]  [address] [id number] [place] [address]-
1210     

 

 [facility] [place] [facility]  [address] [id number] [place] [address]-
6018     

 

 [facility] [place] [facility]  [address] [id number] [place] [address]-
1529     

 

 [facility] [place] [facility]  [address] [id number] [place] [address]-
1066     

 

 [facility]  [address] [id number] [place] [address]-
8213     

 

 [facility] [place] [facility]  [address] [id number] [place] [address]
2546     

 

 [facility]  [address] [id number] [place] [address]-
0036     

 

 [facility]  [address] [id number] [place] [address]-
5127     

 

 [facility]  [address] [id number] [place] [address]-
6496  01 Mar, 2012   

 

 [facility]  [address] [id number] [place] [address]-
2246     

 

 [facility]  [address] [id number] [place] [address]-
8853     

 

 [facility]  [address] [id number] [place] [address]-
1584     

 

 [facility]  [address] [id number] [place] [address]-
7564  10 Andrei, 2012   

 

 [facility]  [address] [id number] [place] [address]-
0456     

 

 [facility]  [address] [id number] [place] [address]-
2156  20 Dec, 2011   

 

 [facility]  [address] [id number] [place] [address]-
0362  20 Dec, 2011   

 

 [facility] [place] [facility]  [address] [id number] [place] [address]-
4236  13 Dec, 2011   

 

 [facility]  [address] [id number] [place] [address]-
2546  08 Dec, 2011   

 

 [facility]  [address] [id number] [place] [address]-
9999  08 Dec, 2011   

 

 [facility] [facility]  [address] [facility] [id number] [address]
3585  06 Dec, 2011   

 

 [facility]  [address] [facility] [id number], [place] [address]-
2375  10 Oct, 2011   

 

 [facility]  [address] [facility] [id number][place] [address]-
3091  10 Oct, 2011   

 

 [facility]  [address] [facility] [id number], [place] [address]-
8204     

 

 [facility]  [address] [facility] [id number], [place] [address]-
2117  20 Dec, 2010   

 

 [facility]  [address] [facility] [id number][place] [address]
2111  20 Dec, 2010   

 

 [facility]  [id number], [place] [address]-
3984     

 

 [facility]  [id number], [place] [address]-
8835     

 

 [facility]  [id number] [address]-
4748  15 Oct, 2009   

 

 [facility]  [id number] [address]-
1056  15 Oct, 2009   

 

 [facility]  [address] [id number] [address]
8448     







IMMUNIZATIONS

No Known Immunizations



SOCIAL HISTORY

Never Assessed



REASON FOR VISIT

Controlled Med Refill



PLAN OF CARE





VITAL SIGNS





MEDICATIONS







 Medication  Instructions  Dosage  Frequency  Start Date  End Date  Duration  
Status

 

 Xanax 1 MG  Orally Once a day  1 tablet  24h       28 days  Active







RESULTS

No Results



PROCEDURES

No Known procedures



INSTRUCTIONS





MEDICATIONS ADMINISTERED

No Known Medications



MEDICAL (GENERAL) HISTORY







 Type  Description  Date

 

 Medical History  hypothyroidism   

 

 Medical History  type II diabetes   

 

 Medical History  back pain   

 

 Medical History  hyperlipidemia   

 

 Medical History  anxiety   

 

 Medical History  mood disorder   

 

 Medical History  heart murmur   

 

 Medical History  chronic bronchitis   

 

 Medical History  [place]   

 

 Surgical History  appendectomy   

 

 Hospitalization History  childbirth   

 

 Hospitalization History  pancreatitis  2005

 

 Hospitalization History  appendectomy   

 

 Hospitalization History  Anaphylaxis to Bactrim  2016

## 2019-01-30 NOTE — XMS REPORT
[facility]

 Created on: 10/27/2015



Libra Galvez

External Reference #: [id number]

: 1965

Sex: Female



Demographics







 Address  [address]  [address]

 

 Home Phone  [phone]

 

 Preferred Language  Unknown

 

 Marital Status  Unknown

 

 Mosque Affiliation  Unknown

 

 Race  White

 

 Ethnic Group  Not  or 





Author







 Author  ANIKET MAYFIELD

 

 Organization  [employer]

 

 Address  Unknown

 

 Phone  Unavailable







Care Team Providers







 Care Team Member Name  Role  Phone

 

 ANIKET MAYFIELD  CP  Unavailable



                                                                



Allergies

          No Known Allergies                                                   
                                     



Problems

          





 Problem Type  Condition  Code  Onset Dates  Condition Status

 

 Problem  Pure hyperglyceridemia  272.1     Active

 

 Problem  Dysuria  788.1     Active

 

 Problem  Other bursitis disorders  727.3     Active

 

 Problem  Diabetes  250.00     Active

 

 Problem  Unspecified episodic mood disorder  296.90     Active

 

 Problem  Anxiety state, unspecified  300.00     Active

 

 Problem  Lumbago  724.2     Active

 

 Problem  Other chronic nonalcoholic liver disease  571.8     Active



                                                                               
                                                                               



Medications

          





 Medication  Code System  Code  Instructions  Start Date  End Date  Status  
Dosage

 

 Xanax  NDC  [address]-73  1 MG Orally Once a day  May 12, 2015        1 tablet



                                                                              



Results

          No Known Results                                                     
               



Summary Purpose

          [employer] Submission

## 2019-01-30 NOTE — XMS REPORT
[facility]

 Created on: 2018



Libra Galvez

External Reference #: [id number]

: 1965

Sex: Female



Demographics







 Address  [address]  [address]

 

 Preferred Language  Unknown

 

 Marital Status  Unknown

 

 [employer] Affiliation  Unknown

 

 Race  Unknown

 

 Ethnic Group  Unknown





Author







 Author  ANIKET MAYFIELD

 

 [facility]

 

 Address  [address] [place]  [address]



 

 Phone  [phone]







Care Team Providers







 Care Team Member Name  Role  Phone

 

 ANIKET MAYFIELD  Unavailable  [phone]







PROBLEMS







 Type  Condition  ICD9-CM Code  RJH05-QP Code  Onset Dates  Condition Status  
SNOMED Code

 

 Problem  Hypertriglyceridemia     E78.1     Active  959659541

 

 Problem  Status post cholecystectomy     Z90.49     Active  673328354

 

 Problem  Anxiety disorder, unspecified     F41.9     Active  [id number]

 

 Problem  Acquired hypothyroidism     E03.9     Active  635546839

 

 Problem  Diabetes     E11.9     Active  070402639







ALLERGIES

No Information



ENCOUNTERS







 Encounter  Location  Date  Diagnosis

 

 [place] [id number] [address]-
2750     

 

 [facility]  [id number] [place] [address] 70969-
5733     

 

 [place] [address] [address]-
2139    Ketoacidosis E87.2 ; Status post cholecystectomy Z90.49 ; 
Diabetes E11.9 ; Acquired hypothyroidism E03.9 ; Hypertriglyceridemia E78.1 and 
Vaginal yeast infection B37.3

 

 [place] [id number] [place] [address] 00058-
4425  15 May, 2018   

 

 [facility]  [id number] [place] [address] 87472-
8290     

 

 [facility]  [address] [address]-
2503  20 Mar, 2018   

 

 [facility]  [id number] [place] [address] 16141-
4056     

 

 [place] [address] [address]-
0952     

 

 [facility]  [id number] [place] [address] 44738-
8749  10 Andrei, 2018  Diabetes E11.9 and Drug-induced acute pancreatitis with 
uninfected necrosis K85.31

 

 [facility]  [id number] [place] [address] 64656-
3040  27 Dec, 2017   

 

 [facility]  [address] [place] [address] 84689-
3390     

 

 [facility]  [id number] [place] [address] 17075
-6563  10 Nov, 2017  Crushing injury of right foot, initial encounter S97.81XA

 

 [facility]  [id number] [place] [address] 72403-
5028  27 Sep, 2017   

 

 [facility]  [address] [place] [address] 04965-
9766  21 Sep, 2017   

 

 [facility]  [id number] [place] [address] 08095
-8113  19 Sep, 2017  Acute non-recurrent pansinusitis J01.40

 

 [facility]  [id number] [place] [address] 80801-
6306  19 Sep, 2017   

 

 [facility]  [address] [place] [address] 74339-
3032  04 Aug, 2017   

 

 [facility]  [address] [place] [address] 77166-
7028     

 

 [facility]  [address] [place] [address] 17391-
2082  26 May, 2017   

 

 [facility]  [address] [address]-
6673  08 May, 2017  Diabetes E11.9 ; Anxiety disorder, unspecified F41.9 and 
Acquired hypothyroidism E03.9

 

 [facility]  [address] [place] [address] 91064-
8000  01 May, 2017   

 

 [facility]  [address] [address]-
5379     

 

 [facility]  [address] [place] [address] 63127-
1146     

 

 [facility]  [address] [place] [address] 95083-
8029  06 Mar, 2017   

 

 [facility]  [address] [place] [address] 14506-
3475     

 

 [facility]  [address] [place] [address] 91756-
2199     

 

 [facility]  [address] [place] [address] 13507-
5585     

 

 [facility]  [address] [place] [address] 65798-
2026     

 

 [facility]  [address] [place] [address] 04552-
2872    Acute non-recurrent maxillary sinusitis J01.00

 

 [facility]  [address] [place] [address] 67868-
6598     

 

 [facility]  [address]65[address] [address]-
0149     

 

 [facility]  [address] [address]-
4911  12 Dec, 2016   

 

 [facility]  [address] [place] [address] 04473-
9557  15 Nov, 2016   

 

 [facility]  [address] [place] [address] 11484-
3558  12 Oct, 2016  Diabetes E11.9

 

 [facility]  [address] [place] [address] 45551-
4386  28 Sep, 2016  Pelvic pain R10.2 ; Leukocytosis, unspecified D72.829 ; 
Constipation, unspecified constipation type K59.00 and History of pancreatitis 
Z87.19

 

 [facility]  [address] [facility] [id number] [address]-
4892  22 Sep, 2016   

 

 [facility]  [address] [facility] [id number] [address]-
7716  14 Sep, 2016  Diabetes E11.9

 

 [facility]  [address] [facility] [id number] [address]
2546  13 Sep, 2016   

 

 [facility]  [address] [facility] [id number][place] [address]
2542  09 Sep, 2016  Vaginal yeast infection B37.3

 

 [facility]  [address] [facility] [id number] [place] [address]-
1296  08 Sep, 2016   

 

 [facility]  [id number] [address]-
5666  30 Aug, 2016  Diabetes E11.9

 

 [facility]  [id number] [address]-
8560  25 Aug, 2016  Anxiety disorder, unspecified F41.9

 

 [facility]  [address] [facility] [id number][place] [address]-
4626  17 Aug, 2016  Vaginal yeast infection B37.3

 

 [facility]  [address] [facility] [id number], [place] [address]-
5418  17 Aug, 2016   

 

 [facility]  [id number] [address]-
9686    Anxiety disorder, unspecified F41.9

 

 [facility]  [address] [id number] [address]-
0397    Vaginal yeast infection B37.3

 

 [facility]  [address] [facility] [id number][place] [address]-
7123    Anxiety disorder, unspecified F41.9

 

 [facility]  [address] [facility] [id number][place] [address]-
0448    Anxiety disorder, unspecified F41.9

 

 [facility]  [id number] [place] [address] 65126-
0836  06 May, 2016  Anxiety disorder, unspecified F41.9

 

 [facility]  [address] [place] [address] 51582-
8385  04 May, 2016  Diabetes E11.9

 

 [facility]  [address] [place] [address] 84852-
9071    Anxiety disorder, unspecified F41.9

 

 [facility]  [address] [place] [address] 30234-
8489     

 

 [facility]  [address] [place] [address] 59595-
3180  25 Mar, 2016  Vaginal yeast infection B37.3

 

 [facility]  [address] [place] [address] 75052-
8542  15 Mar, 2016   

 

 [facility]  [address] [place] [address] 31026-
2996     

 

 [facility]  [address] [place] [address] 83849-
9363     

 

 [facility]  [address] [place] [address] 26031-
3400     

 

 [facility]  [address] [place] [address] 12070-
5110  15 Andrei, 2016   

 

 [facility]  [address] [place] [address] 68373-
5748  22 Dec, 2015   

 

 [facility]  [address] [place] [address] 11820-
1123    Diabetes E11.9 ; Acquired hypothyroidism E03.9 ; 
Hyperlipidemia, unspecified hyperlipidemia E78.5 and Anxiety F41.9

 

 [facility]  [address] [place] [address] 38112-
7861     

 

 [facility]  [address] [place] [address] 49806-
4406  27 Oct, 2015   

 

 [facility]  [address] [place] [address] [place] [address]-
2546  29 Sep, 2015   

 

 [facility]  [address]65100[place] [address]-
2546  01 Sep, 2015   

 

 [facility]  [address]00565100[place] [address]-
2546  04 Aug, 2015   

 

 [facility]  [address] [place] [address] 25664-
2546  15 Jul, 2015   

 

 [facility]  [address] [place] [address] 39887-
2546    DUB (dysfunctional uterine bleeding) 626.8 and Pap test, as 
part of routine gynecological examination V76.2

 

 [facility]  [address] [place] [address] 35427-
3636     

 

 [facility]  [address] [place] [address] 80938-
7746     

 

 [facility]  [address] [place] [address] 13277-
9546     

 

 [facility]  [address]00565100[place] [address]-
7476     

 

 [facility]  [address] [address]-
5736  15 Eagle, 2015  Diabetes 250.00

 

 [facility]  [address]65[address] [address]
2546     

 

 [facility]  [address]00565[address], [place] [address]-
2546  19 May, 2015   

 

 [facility]  [address]00565100[place], [place] [address]
2546  13 May, 2015  Diabetes 250.00

 

 [facility]  [address]00565100[place] [address]
2546  12 May, 2015   

 

 [facility]  [address]65100[place] [address]-
2546  07 May, 2015   

 

 [facility] [place] [facility]  [address] [id number] [place] [address]
2546  07 May, 2015   

 

 [facility] [place] [facility]  [address] [id number] [place] [address]-
9787  05 May, 2015   

 

 [facility] [place] [facility]  [address] [id number] [place] [address]-
2546  01 May, 2015   

 

 [facility] [place] [facility]  [address] [id number] [place] [address]-
1588     

 

 [facility] [place] [facility]  [address] [id number] [place] [address]-
2363     

 

 [facility] [place] [facility]  [address] [id number] [place] [address]-
2040     

 

 [facility] [place] [facility]  [address] [id number] [place] [address]-
8770  17 Mar, 2015   

 

 [facility] [place] [facility]  [address] [id number] [place] [address]-
6258  17 Mar, 2015   

 

 [facility] [place] [facility]  [address] [id number] [place] [address]-
9221  17 Mar, 2015   

 

 [facility] [place] [facility]  [address] [id number] [place] [address]-
1618  17 Mar, 2015   

 

 [facility] [place] [facility]  [address] [id number] [place] [address]-
3164  09 Mar, 2015   

 

 [facility] [place] [facility]  [address] [id number] [place] [address]-
7206  09 Mar, 2015   

 

 [facility] [place] [facility]  [address] [id number] [place] [address]-
2544  06 Mar, 2015   

 

 [facility] [place] [facility]  [address] [id number] [place] [address]-
2546  03 Mar, 2015   

 

 [facility] [place] [facility]  [address] [id number] [place] [address]-
2546  03 Mar, 2015   

 

 [facility] [place] [facility]  [address] [id number] [place] [address]-
3389  ,    

 

 [facility] [place] [facility]  [address] [id number] [place] [address]-
2216  ,    

 

 [facility] [place] [facility]  [address] [id number] [place] [address]-
5296  ,    

 

 [facility] [place] [facility]  [address] [facility] [id number] [place] [address]-
7006  ,    

 

 [facility] [place] [facility]  [address] [id number] [place] [address]-
0517  ,    

 

 [facility] [place] [facility]  [address] [id number] [place] [address]-
8633  ,    

 

 [facility] [place] [facility]  [address] [facility] [id number] [place] [address]-
3224     

 

 [facility] [place] [facility]  [address] [facility] [id number] [place] [address]-
6394     

 

 [facility] [place] [facility]  [address] [facility] [id number] [place] [address]-
4055     

 

 [facility] [place] [facility]  [address] [facility] [id number] [place] [address]-
4132     

 

 [facility] [place] [facility]  [address] [facility] [id number] [place] [address]-
3814     

 

 [facility] [place] [facility]  [address] [facility] [id number] [place] [address]-
5530     

 

 [facility] [place] [facility]  [address] [facility] [id number], [place] [address]-
4730     

 

 [facility] [place] [facility]  [address] [id number], [place] [address]-
5190     

 

 [facility] [place] [facility]  [address] [facility] [id number], [place] [address]-
9675     

 

 [facility] [place] [facility]  [address] [facility] [id number][place] [address]-
2213     

 

 [facility] [place] [facility]  [address] [id number] [place] [address]-
0583     

 

 [facility] [place] [facility]  [address] [id number] [place] [address]-
2939     

 

 [facility] [place] [facility]  [address] [id number] [place] [address]-
2362     

 

 [facility] [place] [facility]  [address] [id number] [place] [address]-
0326     

 

 [facility] [place] [facility]  [address] [id number] [place] [address]-
7968  23 Dec, 2014   

 

 [facility] [place] [facility]  [address] [id number] [place] [address]-
6064  23 Dec, 2014   

 

 [facility] [place] [facility]  [address] [id number] [place] [address]-
1486  22 Dec, 2014   

 

 [facility] [place] [facility]  [address] [id number] [place] [address]-
7423  22 Dec, 2014   

 

 [facility] [place] [facility]  [address] [id number] [place] [address]-
2570  17 Dec, 2014   

 

 [facility] [place] [facility]  [address] [id number] [place] [address]-
1084  17 Dec, 2014   

 

 [facility] [place] [facility]  [address] [id number] [place] [address]-
1812  15 Dec, 2014   

 

 [facility] [place] [facility]  [address] [id number] [place] [address]-
6722  15 Dec, 2014   

 

 [facility] [place] [facility]  [address] [id number] [place] [address]-
0931  12 Dec, 2014   

 

 [facility] [place] [facility]  [address] [id number] [place] [address]-
6985  08 Dec, 2014   

 

 [facility] [place] [facility]  [address] [id number] [place] [address]-
9262  08 Dec, 2014   

 

 [facility] [place] [facility]  [address] [id number] [place] [address]-
3762  08 Dec, 2014   

 

 [facility] [place] [facility]  [address] [id number] [place] [address]-
3829  08 Dec, 2014   

 

 [facility] [place] [facility]  [address] [id number] [place] [address]-
9882     

 

 [facility] [place] [facility]  [address] [facility] [id number] [place] [address]-
2374     

 

 [facility] [place] [facility]  [address] [id number] [place] [address]-
0084     

 

 [facility] [place] [facility]  [address] [id number] [place] [address]-
0427     

 

 [facility] [place] [facility]  [address] [id number] [place] [address]-
0832  10 Nov, 2014   

 

 [facility] [place] [facility]  [address] [id number] [place] [address]-
9237  10 Nov, 2014   

 

 [facility] [place] [facility]  [address] [id number][place] [address]-
9416  07 Oct, 2014   

 

 [facility] [place] [facility]  [address] [id number][place] [address]-
0116  07 Oct, 2014   

 

 [facility] [place] [facility]  [address] [id number][place] [address]-
0562  01 Oct, 2014   

 

 [facility] [place] [facility]  [address] [id number][place] [address]-
8484  01 Oct, 2014   

 

 [facility] [place] [facility]  [address] [id number][place] [address]-
3025  24 Sep, 2014   

 

 [facility] [place] [facility]  [address] [id number] [place] [address]-
6575  24 Sep, 2014   

 

 [facility] [place] [facility]  [address] [id number][place] [address]-
2133  23 Sep, 2014   

 

 [facility] [place] [facility]  [address] [id number][place] [address]-
3640  23 Sep, 2014   

 

 [facility] [place] [facility]  [address] [id number] [place] [address]-
1564  22 Sep, 2014   

 

 [facility] [place] [facility]  [address] [id number] [place] [address]-
4064  22 Sep, 2014   

 

 [facility] [place] [facility]  [address] [id number] [place] [address]-
2252  19 Aug, 2014   

 

 [facility] [place] [facility]  [address] [id number] [place] [address]-
5456  19 Aug, 2014   

 

 [facility] [place] [facility]  [address] [id number] [place] [address]-
2485     

 

 [facility] [place] [facility]  [address] [id number] [place] [address]-
6299     

 

 [facility] [place] [facility]  [address] [id number] [place] [address]-
5047  10 Eagle, 2014   

 

 [facility] [place] [facility]  [address] [id number] [place] [address]-
6756     

 

 [facility] [place] [facility]  [address] [id number] [place] [address]-
5377     

 

 [facility] [place] [facility]  [address] [id number] [place] [address]-
9782  07 May, 2014   

 

 [facility] [place] [facility]  [address] [id number] [place] [address]-
2069  07 May, 2014   

 

 [facility] [place] [facility]  [address] [id number] [place] [address]-
6195     

 

 [facility] [place] [facility]  [address] [id number] [place] [address]-
8544     

 

 [facility] [place] [facility]  [address] [id number] [place] [address]-
4826     

 

 [facility] [place] [facility]  [address] [id number] [place] [address]-
6146     

 

 [facility] [place] [facility]  [address] [id number] [place] [address]-
3139     

 

 [facility] [place] [facility]  [address] [id number] [place] [address]-
0904     

 

 [facility] [place] [facility]  [address] [id number] [place] [address]-
2494     

 

 [facility] [place] [facility]  [address] [id number] [place] [address]-
3872     

 

 [facility] [place] [facility]  [address] [id number] [place] [address]-
9226     

 

 [facility] [place] [facility]  [address] [id number] [place] [address]-
5638     

 

 [facility] [place] [facility]  [address] [id number] [place] [address]-
5241  17 Mar, 2014   

 

 [facility] [place] [facility]  [address] [id number] [place] [address]-
6711  17 Mar, 2014   

 

 [facility] [place] [facility]  [address] [id number] [place] [address]-
9120     

 

 [facility] [place] [facility]  [address] [id number] [place] [address]-
8972     

 

 [facility] [place] [facility]  [address] [id number] [place] [address]-
9346     

 

 [facility] [place] [facility]  [address] [id number] [place] [address]-
5990     

 

 [facility] [place] [facility]  [address] [id number][place] [address]-
4406  25 Oct, 2013   

 

 [facility] [place] [facility]  [address] [id number] [place] [address]-
4818  25 Oct, 2013   

 

 [facility] [place] [facility]  [address] [id number] [place] [address]-
2666  23 Sep, 2013   

 

 [facility] [place] [facility]  [address] [id number] [place] [address]-
5088  06 Aug, 2013   

 

 [facility] [place] [facility]  [address] [id number][place] [address]-
9381  05 Aug, 2013   

 

 [facility] [place] [facility]  [address] [id number] [place] [address]-
3809     

 

 [facility] [place] [facility]  [address] [id number] [place] [address]-
2919     

 

 [facility] [place] [facility]  [address] [facility] [id number] [place] [address]-
3803     

 

 [facility] [place] [facility]  [address] [id number] [place] [address]-
5367  28 Dec, 2012   

 

 [facility] [place] [facility]  [address] [id number] [place] [address], [place] [address]-
6400  19 Dec, 2012   

 

 [facility] [place] [facility]  [address] [id number] [place] [address]-
3410  19 Dec, 2012   

 

 [facility]  [address] [facility] [id number] [place] [address], [place] [address]-
1899  13 Dec, 2012   

 

 [facility] [place] [facility]  [address] [facility] [id number] [place] [address]-
1673  13 Dec, 2012   

 

 [facility] [place] [facility]  [address] [facility] [id number] [place] [address]-
2457     

 

 [facility] [place] [facility]  [address] [facility] [id number] [place] [address]-
7460     

 

 [facility] [place] [facility]  [address] [facility] [id number], [place] [address]-
7286  19 Oct, 2012   

 

 [facility] [place] [facility]  [address] [id number][place] [address]-
1690  18 Oct, 2012   

 

 [facility] [place] [facility]  [address] [id number] [place] [address]-
8686  17 Oct, 2012   

 

 [facility] [place] [facility]  [address] [facility] [id number], [place] [address]-
8112  17 Oct, 2012   

 

 [facility] [place] [facility]  [address] [facility] [id number], [place] [address]-
3971  16 Oct, 2012   

 

 [facility] [place] [facility]  [address] [id number] [place] [address]-
3738  16 Oct, 2012   

 

 [facility] [place] [facility]  [address] [id number] [place] [address]-
4001  15 Oct, 2012   

 

 [facility] [place] [facility]  [address] [id number] [place] [address]-
2406  27 Sep, 2012   

 

 [facility] [place] [facility]  [address] [id number] [place] [address]-
3276  26 Sep, 2012   

 

 [facility] [place] [facility]  [address] [id number] [place] [address]-
9681     

 

 [facility] [place] [facility]  [address] [id number] [place] [address]-
6293     

 

 [facility] [place] [facility]  [address] [id number] [place] [address]-
6625     

 

 [facility] [place] [facility]  [address] [id number] [place] [address]-
5298     

 

 [facility] [place] [facility]  [address] [id number] [place] [address]-
0170     

 

 [facility] [place] [facility]  [address] [id number] [place] [address]-
0763     

 

 [facility] [place] [facility]  [address] [id number] [place] [address]-
2366     

 

 [facility] [place] [facility]  [address] [id number] [place] [address]-
3523     

 

 [facility] [place] [facility]  [address] [id number] [place] [address]-
4583  01 Mar, 2012   

 

 [facility] [place] [facility]  [address] [id number] [place] [address]-
4896     

 

 [facility] [place] [facility]  [address] [id number] [place] [address]-
5256     

 

 [facility] [place] [facility]  [address] [id number] [place] [place] [address]     

 

 [facility]  [address] [id number] [place] [address]-
3[address]  10 Andrei, 2012   

 

 [facility] [place] [facility]  [address] [id number] [place] [address]-
8[address]     

 

 [facility] [place] [facility]  [address] [id number] [place] [address]-
5511  20 Dec, 2011   

 

 [facility] [place] [facility]  [address] [id number] [place] [address]-
1062  20 Dec, 2011   

 

 [facility] [place] [facility]  [address] [id number] [place] [address]-
6387  13 Dec, 2011   

 

 [facility] [place] [facility]  [address] [id number] [place] [address]-
7756  08 Dec, 2011   

 

 [facility] [place] [facility]  [address] [id number] [place] [address]-
6656  08 Dec, 2011   

 

 [facility] [place] [facility]  [address] [id number] [place] [address]-
8468  06 Dec, 2011   

 

 [facility] [place] [facility]  [address] [id number] [place] [address]-
2955  10 Oct, 2011   

 

 [facility] [place] [facility]  [address] [id number] [place] [address]-
1018  10 Oct, 2011   

 

 [facility] [place] [facility]  [address] [id number][place] [address]-
4221     

 

 [facility] [place] [facility]  [address] [id number], [place] [address]-
8254  20 Dec, 2010   

 

 [facility] [place] [facility]  [address] [id number] [place] [address]-
2874  20 Dec, 2010   

 

 [facility] [place] [facility]  [address] [id number] [place] [address]-
2696     

 

 [facility] [place] [facility]  [address] [id number] [place] [address]-
2546     

 

 [facility] [place] [facility]  [address] [facility] [id number] [place] [address]-
7048  15 Oct, 2009   

 

 [facility]  [address] [facility] [id number] [place] [address]-
5932  15 Oct, 2009   

 

 [facility]  [address] [facility] [id number] [place] [address]-
7291     







IMMUNIZATIONS

No Known Immunizations



SOCIAL HISTORY

Never Assessed



REASON FOR VISIT

Controlled Med Refill



PLAN OF CARE





VITAL SIGNS





MEDICATIONS







 Medication  Instructions  Dosage  Frequency  Start Date  End Date  Duration  
Status

 

 Xanax 1 MG  Orally Once a day  1 tablet  24h       28 days  Active







RESULTS

No Results



PROCEDURES

No Known procedures



INSTRUCTIONS





MEDICATIONS ADMINISTERED

No Known Medications



MEDICAL (GENERAL) HISTORY







 Type  Description  Date

 

 Medical History  hypothyroidism   

 

 Medical History  type II diabetes   

 

 Medical History  back pain   

 

 Medical History  hyperlipidemia   

 

 Medical History  anxiety   

 

 Medical History  mood disorder   

 

 Medical History  heart murmur   

 

 Medical History  chronic bronchitis   

 

 Medical History  [place]   

 

 Surgical History  appendectomy   

 

 Surgical History  gallbladder removal   

 

 Hospitalization History  childbirth   

 

 Hospitalization History  pancreatitis  2005

 

 Hospitalization History  appendectomy   

 

 Hospitalization History  Anaphylaxis to Bactrim

## 2019-01-30 NOTE — XMS REPORT
[facility]

 Created on: 2018



Galvez Libra

External Reference #: [id number]

: 1965

Sex: Female



Demographics







 Address  [address]  [address]

 

 Preferred Language  Unknown

 

 Marital Status  Unknown

 

 Mormonism Affiliation  Unknown

 

 Race  Unknown

 

 Ethnic Group  Unknown





Author







 Author  ANIKET MAYFIELD

 

 Organization  [facility]

 

 Address  [address] [place]  [address]



 

 Phone  [phone]







Care Team Providers







 Care Team Member Name  Role  Phone

 

 ANIKET MAYFIELD  Unavailable  [phone]







PROBLEMS







 Type  Condition  ICD9-CM Code  WUM34-EG Code  Onset Dates  Condition Status  
SNOMED Code

 

 Problem  Anxiety disorder, unspecified     F41.9     Active  809047173

 

 Problem  Violation of controlled substance agreement     Z91.14     Active  
289701145

 

 Problem  Intestinal malabsorption, unspecified     K90.9     Active  20976199

 

 Problem  Acquired hypothyroidism     E03.9     Active  616102612

 

 Problem  Diabetes     E11.9     Active  117035773

 

 Problem  Hypertriglyceridemia     E78.1     Active  046354992

 

 Problem  Status post cholecystectomy     Z90.49     Active  733708298







ALLERGIES







 Substance  Reaction  Event Type  Date  Status

 

 Sulfamethoxazole-Trimethoprim  anaphylaxis  Drug Allergy    Active







ENCOUNTERS







 Encounter  Location  Date  Diagnosis

 

 [facility]  [id number] [place] [address] 84806-
8286  01 Oct, 2018   

 

 [facility]  [id number] [place] [address] 69538-
0563  29 Aug, 2018  Yeast infection B37.9

 

 [facility]  [id number] [place] [address] 59580-
2754  21 Aug, 2018   

 

 [facility]  [id number] [place] [address] 49633-
4727    Intestinal malabsorption, unspecified K90.9 ; Diarrhea, 
unspecified R19.7 ; Diabetes E11.9 and Marijuana smoker F12.90

 

 [facility]  [id number] [place] [address] 
[id number]    Dental examination Z01.20

 

 [facility]  [address] [id number] 
[id number]  10 Jul, 2018  Dental examination Z01.20 and Dental caries K02.9

 

 [facility]  [id number] [place] [address] 83244-
6441  02 2018  Benzodiazepine use agreement exists Z02.89 ; Therapeutic 
drug monitoring Z51.81 and Violation of controlled substance agreement Z91.14

 

 [facility]  [id number] [place] [address] 61591-
8872  05 2018   

 

 [facility]  [address] [address]-
1731  04 2018  Ketoacidosis E87.2 ; Status post cholecystectomy Z90.49 ; 
Diabetes E11.9 ; Acquired hypothyroidism E03.9 ; Hypertriglyceridemia E78.1 and 
Vaginal yeast infection B37.3

 

 [place] [address] [address]-
0229  15 May, 2018   

 

 [facility]  [address] [address]-
7138     

 

 [facility]  [address] [address]-
6225  20 Mar, 2018   

 

 [facility]  [address] [address]-
1352     

 

 [facility]  [id number] [place] [address] 15789-
7359     

 

 [facility]  [id number] [place] [address] 53999-
3142  10 Andrei, 2018  Diabetes E11.9 and Drug-induced acute pancreatitis with 
uninfected necrosis K85.31

 

 [facility]  [id number] [place] [address] 69576-
2256  27 Dec, 2017   

 

 [facility]  [address] [address]-
6924     

 

 [facility]  [id number] [place] [address] 22831
-5403  10 2017  Crushing injury of right foot, initial encounter S97.81XA

 

 [facility]  [address] [place] [address]-
7186  27 Sep, 2017   

 

 [facility]  [address] [place] [address] 86371-
0931  21 Sep, 2017   

 

 [facility]  [address]65[place] [address]
-3072  19 Sep, 2017  Acute non-recurrent pansinusitis J01.40

 

 [facility]  [id number] [place] [address] 53075-
8304  19 Sep, 2017   

 

 [facility]  [id number] [place] [address] 12853-
8277  04 Aug, 2017   

 

 [facility]  [id number] [place] [address] 90020-
4644     

 

 [facility]  [id number] [place] [address] 67972-
1304  26 May, 2017   

 

 [facility]  [id number] [place] [address] 96991-
8056  08 May, 2017  Diabetes E11.9 ; Anxiety disorder, unspecified F41.9 and 
Acquired hypothyroidism E03.9

 

 [facility]  [address] [place] [address] 97527-
2697  01 May, 2017   

 

 [facility]  [address]65100[place] [address]-
5281     

 

 [facility]  [address]65100[place] [address]-
3518     

 

 [facility]  [address]65[place] [address]-
4262  06 Mar, 2017   

 

 [facility]  [id number] [place] [address] 51100-
0178     

 

 [facility]  [address]65100[place] [address]-
8434     

 

 [facility]  [id number] [place] [address] 43495-
3193     

 

 [facility]  [address]65100[place] [address]-
7153     

 

 [facility]  [id number] [place] [address] 71706-
2661    Acute non-recurrent maxillary sinusitis J01.00

 

 [facility]  [address] [place] [address] 48170-
4854     

 

 [facility]  [id number] [place] [address] 33252-
9309     

 

 [facility]  [id number] [place] [address] 65577-
0336  12 Dec, 2016   

 

 [facility]  [id number] [place] [address] 01922-
2047  15 Nov, 2016   

 

 [facility]  [id number] [place] [address] 60296-
6099  12 Oct, 2016  Diabetes E11.9

 

 [facility]  [id number] [place] [address] 63717-
7028  28 Sep, 2016  Pelvic pain R10.2 ; Leukocytosis, unspecified D72.829 ; 
Constipation, unspecified constipation type K59.00 and History of pancreatitis 
Z87.19

 

 [facility]  [address]65100[place] [address]-
6764  22 Sep, 2016   

 

 [facility]  [id number] [place] [address] 97563-
3928  14 Sep, 2016  Diabetes E11.9

 

 [facility]  [address]65100[place] [address]-
4397  13 Sep, 2016   

 

 [facility]  [id number] [place] [address] 02223-
2714  09 Sep, 2016  Vaginal yeast infection B37.3

 

 [facility]  [address] [place] [address] 85880-
4784  08 Sep, 2016   

 

 [facility]  [address] [address]-
6118  30 Aug, 2016  Diabetes E11.9

 

 [facility]  [address] [place] [address] 02575-
4499  25 Aug, 2016  Anxiety disorder, unspecified F41.9

 

 [facility]  [id number] [place] [address] 63606-
6793  17 Aug, 2016  Vaginal yeast infection B37.3

 

 [facility]  [id number] [place] [address] 50254-
1309  17 Aug, 2016   

 

 [facility]  [id number] [place] [address] 42341-
6571    Anxiety disorder, unspecified F41.9

 

 [facility]  [id number] [place] [address] 31812-
4294    Vaginal yeast infection B37.3

 

 [facility]  [id number] [place] [address] 24460-
1479    Anxiety disorder, unspecified F41.9

 

 [facility]  [id number] [place] [address] 25924-
2441    Anxiety disorder, unspecified F41.9

 

 [facility]  [address] [place] [address] 54610-
4924  06 May, 2016  Anxiety disorder, unspecified F41.9

 

 [facility]  [address] [place] [address] 11201-
6295  04 May, 2016  Diabetes E11.9

 

 [facility]  [address] [place] [address] 47170-
3417    Anxiety disorder, unspecified F41.9

 

 [facility]  [id number] [place] [address] 36605-
4764     

 

 [facility]  [address] [place] [address] 59980-
1231  25 Mar, 2016  Vaginal yeast infection B37.3

 

 [facility]  [id number] [place] [address] 54116-
2333  15 Mar, 2016   

 

 [facility]  [id number] [place] [address] 39945-
9151     

 

 [facility]  [id number] [place] [address] 36556-
8850     

 

 [facility]  [address] [place] [address] 21000-
1156     

 

 [facility]  [address] 22277-
6285  15 Andrei, 2016   

 

 [facility]  [id number] [place] [address] 34567-
6136  22 Dec, 2015   

 

 [facility]  [address] 14631-
7264    Diabetes E11.9 ; Acquired hypothyroidism E03.9 ; 
Hyperlipidemia, unspecified hyperlipidemia E78.5 and Anxiety F41.9

 

 [facility]  [id number] [place] [address] 03489-
6873     

 

 [facility]  [id number] [place] [address] 83442-
8009  27 Oct, 2015   

 

 [facility]  [address] [place] [address] 45918-
7175  29 Sep, 2015   

 

 [facility]  [address] [place] [address] 41616-
8378  01 Sep, 2015   

 

 [facility]  [address] [place] [address] 01154-
4729  04 Aug, 2015   

 

 [facility]  [id number] [place] [address] 88953-
0700  15 Jul, 2015   

 

 [facility]  [id number] [place] [address] 352045-
2067    DUB (dysfunctional uterine bleeding) 626.8 and Pap test, as 
part of routine gynecological examination V76.2

 

 [facility]  [id number] [place] [address] [place] [address]-
1658     

 

 [facility]  [address] [id number] [place] [address]-
0249     

 

 [facility]  [address] [facility] [id number] [place] [address]-
0076     

 

 [facility]  [address] [id number] [place] [address]-
4386     

 

 [facility]  [address] [id number] [place] [address]-
2321  15 Eagel, 2015  Diabetes 250.00

 

 [facility]  [id number] [place] [address], [place] [address]-
1917     

 

 [facility]  [id number] [place] [address]-
8626  19 May, 2015   

 

 [facility]  [address] [place] [address]-
8568  13 May, 2015  Diabetes 250.00

 

 [facility]  [address] [id number] [place] [address]-
3128  12 May, 2015   

 

 [facility]  [address] [place] [address]-
4627  07 May, 2015   

 

 [facility]  [id number] [address]-
3905  07 May, 2015   

 

 [facility]  [id number] [place] [address]-
0716  05 May, 2015   

 

 [facility]  [address] [id number] [address]-
0596  01 May, 2015   

 

 [facility]  [address] [id number] [place] [address]-
5186     

 

 [facility]  [address] [id number] [place] [address]-
2506     

 

 [facility]  [address] [id number] [address]-
9875     

 

 [facility] [place] [facility]  [address] [id number] [place] [address]-
6374  17 Mar, 2015   

 

 [facility] [place] [facility]  [address] [id number] [place] [address]-
6475  17 Mar, 2015   

 

 [facility] [place] [facility]  [address] [id number] [place] [address]-
5881  17 Mar, 2015   

 

 [facility] [place] [facility]  [address] [id number] [place] [address]-
1385  17 Mar,    

 

 [facility] [place] [facility]  [address] [id number] [place] [address]-
1833  09 Mar, 2015   

 

 [facility] [place] [facility]  [address] [id number] [place] [address]-
2230  09 Mar, 2015   

 

 [facility] [place] [facility]  [address] [id number] [place] [address]-
6473  06 Mar, 2015   

 

 [facility] [place] [facility]  [address] [id number] [place] [address]-
7389  03 Mar, 2015   

 

 [facility] [place] [facility]  [address] [id number] [place] [address]-
9032  03 Mar, 2015   

 

 [facility] [place] [facility]  [address] [id number] [place] [address]-
7947  ,    

 

 [facility] [place] [facility]  [address] [id number] [place] [address]-
8413  ,    

 

 [facility] [place] [facility]  [address] [id number][place] [address]-
1424  ,    

 

 [facility] [place] [facility]  [address] [id number] [place] [address]-
8470  ,    

 

 [facility] [place] [facility]  [address] [id number] [place] [address]-
8732  ,    

 

 [facility] [place] [facility]  [address] [id number] [place] [address]-
6826  ,    

 

 [facility] [place] [facility]  [address] [id number] [place] [address]-
2546     

 

 [facility]  [address] [id number] [place] [address]-
4996     

 

 [facility] [place] [facility]  [address] [id number] [place] [address]-
4366     

 

 [facility]  [address] [id number] [place] [address]-
0786     

 

 [facility] [place] [facility]  [address] [id number] [place] [address]-
9911     

 

 [facility]  [address] [id number] [place] [address]-
6193     

 

 [facility]  [address] [id number] [place] [address]-
0030     

 

 [facility]  [address] [id number] [place] [address]-
5078     

 

 [facility]  [address] [id number] [place] [address]-
2705     

 

 [facility] [place] [facility]  [address] [id number] [place] [address]-
3482     

 

 [facility]  [address] [id number] [place] [address]-
9509     

 

 [facility] [place] [facility]  [address] [id number] [place] [address]-
0977     

 

 [facility] [place] [facility]  [address] [id number] [place] [address]-
1748     

 

 [facility] [place] [facility]  [address] [id number] [place] [address]-
3894     

 

 [facility] [place] [facility]  [address] [id number] [place] [address]-
3446  23 Dec, 2014   

 

 [facility] [place] [facility]  [address] [id number] [place] [address]-
5575  23 Dec, 2014   

 

 [facility] [place] [facility]  [address] [id number] [place] [address]-
6906  22 Dec, 2014   

 

 [facility] [place] [facility]  [address] [id number] [place] [address]-
4139  22 Dec, 2014   

 

 [facility] [place] [facility]  [address] [id number] [place]-
9022  17 Dec, 2014   

 

 [facility] [place] [facility]  [address] [id number] [place] [address]-
7791  17 Dec, 2014   

 

 [facility] [place] [facility]  [address] [id number] [place] [address]-
1744  15 Dec, 2014   

 

 [facility] [place] [facility]  [address] [id number] [place] [address]-
5915  15 Dec, 2014   

 

 [facility] [place] [facility]  [address] [id number] [place] [address]-
4724  12 Dec, 2014   

 

 [facility] [place] [facility]  [address] [id number] [place] [address]-
9278  08 Dec, 2014   

 

 [facility] [place] [facility]  [address] [id number] [place] [address]-
5336  08 Dec, 2014   

 

 [facility] [place] [facility]  [address] [id number] [place] [address]-
5301  08 Dec, 2014   

 

 [facility] [place] [facility]  [address] [id number] [place] [address]-
9458  08 Dec, 2014   

 

 [facility] [place] [facility]  [address] [id number] [place] [address]-
2878     

 

 [facility] [place] [facility]  [address] [id number] [place] [address]-
6417     

 

 [facility] [place] [facility]  [address] [id number] [place] [address]-
1520     

 

 [facility] [place] [facility]  [address] [id number] [place]
1914     

 

 [facility] [place] [facility]  [address] [id number] [place] [address]-
1368  10 Nov, 2014   

 

 [facility] [place] [facility]  [address] [id number] [place] [address]-
8568  10 Nov, 2014   

 

 [facility] [place] [facility]  [address] [id number] [place] [address]-
4234  07 Oct, 2014   

 

 [facility] [place] [facility]  [address] [id number] [place] [address]-
9026  07 Oct, 2014   

 

 [facility] [place] [facility]  [address] [id number] [place] [address]-
8225  01 Oct, 2014   

 

 [facility] [place] [facility]  [address] [id number] [place] [address]-
6589  01 Oct, 2014   

 

 [facility] [place] [facility]  [address] [id number] [place] [address]-
1559  24 Sep, 2014   

 

 [facility] [place] [facility]  [address] [id number] [place] [address]-
7121  24 Sep, 2014   

 

 [facility] [place] [facility]  [address] [id number] [place] [address]-
3679  23 Sep, 2014   

 

 [facility] [place] [facility]  [address] [id number] [place] [address]-
6597  23 Sep, 2014   

 

 [facility] [place] [facility]  [address] [id number] [place] [address]-
8096  22 Sep, 2014   

 

 [facility] [place] [facility]  [address] [id number] [place] [address]-
5974  22 Sep, 2014   

 

 [facility] [place] [facility]  [address] [id number] [place] [address]-
5280  19 Aug, 2014   

 

 [facility] [place] [facility]  [address] [id number] [place] [address]-
0460  19 Aug, 2014   

 

 [facility] [place] [facility]  [address] [id number] [place] [address]-
9872     

 

 [facility] [place] [facility]  [address] [id number] [place] [address]-
8491     

 

 [facility] [place] [facility]  [address] [id number] [place] [address]-
7497  10 Eagle, 2014   

 

 [facility] [place] [facility]  [address] [id number] [place] [address]-
0439     

 

 [facility] [place] [facility]  [address] [id number] [place]
2110     

 

 [facility] [place] [facility]  [address] [id number] [place] [address]-
4379  07 May, 2014   

 

 [facility] [place] [facility]  [address] [id number] [place] [address]-
2759  07 May, 2014   

 

 [facility] [place] [facility]  [address] [id number] [place] [address]-
9250     

 

 [facility] [place] [facility]  [address] [id number] [place] [address]-
7766     

 

 [facility] [place] [facility]  [address] [id number] [place] [address]-
6747     

 

 [facility] [place] [facility]  [address] [id number] [place] [address]-
7079     

 

 [facility] [place] [facility]  [address] [id number] [place] [address]-
3670     

 

 [facility] [place] [facility]  [address] [id number] [place] [address]-
0720     

 

 [facility] [place] [facility]  [address] [id number] [place] [address]-
6601     

 

 [facility] [place] [facility]  [address] [id number] [place] [address]-
0301     

 

 [facility] [place] [facility]  [address] [id number] [place] [address]-
2227     

 

 [facility] [place] [facility]  [address] [id number] [place]
4964     

 

 [facility] [place] [facility]  [address] [id number] [place] [address]-
4729  17 Mar, 2014   

 

 [facility] [place] [facility]  [address] [id number] [place] [address]-
3328  17 Mar, 2014   

 

 [facility] [place] [facility]  [address] [id number] [place] [address]-
0146     

 

 [facility] [place] [facility]  [address] [id number] [place] [address]
2546     

 

 [facility] [place] [facility]  [address] [id number] [place] [address]-
0695     

 

 [facility] [place] [facility]  [address] [id number] [place] [address]-
6722     

 

 [facility] [place] [facility]  [address] [id number] [place] [address]-
4163  25 Oct, 2013   

 

 [facility] [place] [facility]  [address] [id number] [place] [address]-
1177  25 Oct, 2013   

 

 [facility] [place] [facility]  [address] [id number] [place] [address]-
1541  23 Sep, 2013   

 

 [facility] [place] [facility]  [address] [id number] [place] [address]-
2354  06 Aug, 2013   

 

 [facility] [place] [facility]  [address] [id number] [place] [address]-
6798  05 Aug, 2013   

 

 [facility] [place] [facility]  [address] [id number] [place] [address]-
6124     

 

 [facility] [place] [facility]  [address] [id number] [place] [address]
2547     

 

 [facility] [place] [facility]  [address] [id number] [place] [address]-
3054     

 

 [facility] [place] [facility]  [address] [id number] [place] [address]-
5530  28 Dec, 2012   

 

 [facility] [place] [facility]  [address] [id number] [place] [address]
2546  19 Dec, 2012   

 

 [facility] [place] [facility]  [address] [id number] [place] [address]-
1389  19 Dec, 2012   

 

 [facility] [place] [facility]  [address] [id number] [place] [address]-
9333  13 Dec, 2012   

 

 [facility] [place] [facility]  [address] [id number] [place] [address]-
6534  13 Dec, 2012   

 

 [facility] [place] [facility]  [address] [id number] [place] [address]-
3915     

 

 [facility] [place] [facility]  [address] [id number] [place] [address]-
2470     

 

 [facility] [place] [facility]  [address] [id number] [place] [address], [place] [address]-
1338  19 Oct, 2012   

 

 [facility] [place] [facility]  [address] [id number] [place] [address]-
9434  18 Oct, 2012   

 

 [facility] [place] [facility]  [address] [id number] [place] [address]-
4894  17 Oct, 2012   

 

 [facility] [place] [facility]  [address] [id number] [place] [address]-
0667  17 Oct, 2012   

 

 [facility] [place] [facility]  [address] [id number] [place] [address]-
0682  16 Oct, 2012   

 

 [facility] [place] [facility]  [address] [facility] [id number] [place] [address]-
7231  16 Oct, 2012   

 

 [facility] [place] [facility]  [address] [id number] [place] [address]-
5799  15 Oct, 2012   

 

 [facility] [place] [facility]  [address] [id number] [place] [address]-
0068  27 Sep, 2012   

 

 [facility] [place] [facility]  [address] [id number] [place] [address]-
2025  26 Sep, 2012   

 

 [facility] [place] [facility]  [address] [id number] [place] [address]-
2546     

 

 [facility] [place] [facility]  [address] [id number] [place] [address]-
4666     

 

 [facility]  [address] [id number] [place] [address]-
5933     

 

 [facility] [place] [facility]  [address] [id number] [place] [address]-
5037     

 

 [facility] [place] [facility]  [address] [id number] [place] [address]-
4938     

 

 [facility] [place] [facility]  [address] [id number] [place] [address]-
8252     

 

 [facility] [place] [facility]  [address] [id number] [place] [address]-
4406     

 

 [facility] [place] [facility]  [address] [id number] [place] [address]-
5856     

 

 [facility] [place] [facility]  [address] [id number] [place] [address]-
2096  01 Mar, 2012   

 

 [facility] [place] [facility]  [address] [id number] [place] [address]-
5097     

 

 [facility] [place] [facility]  [address] [id number] [place] [address]-
3087     

 

 [facility] [place] [facility]  [address] [id number] [place] [address]-
2456     

 

 [facility] [place] [facility]  [address] [id number] [place] [address]-
7618  10 Andrei, 2012   

 

 [facility] [place] [facility]  [address] [id number] [place] [address]-
1857     

 

 [facility] [place] [facility]  [address] [id number] [place] [address]-
5725  20 Dec, 2011   

 

 [facility] [place] [facility]  [address] [id number] [place] [address]-
0476  20 Dec, 2011   

 

 [facility] [place] [facility]  [address] [id number] [place] [address]-
9473  13 Dec, 2011   

 

 [facility] [place] [facility]  [id number] [address]
3733  08 Dec, 2011   

 

 [facility]  [id number] [address]
7841  08 Dec, 2011   

 

 [facility]  [id number] [address]-
1286  06 Dec, 2011   

 

 [facility]  [id number] [address]-
1290  10 Oct, 2011   

 

 [facility]  [address] [id number] [address]
6818  10 Oct, 2011   

 

 [facility]  [id number] [place] [address]-
7918     

 

 [facility]  [id number] [address]-
9030  20 Dec, 2010   

 

 [facility]  [id number] [address]-
7231  20 Dec, 2010   

 

 [facility]  [id number], [place] [address]-
1212     

 

 [facility]  [address] [id number] [address]-
7509     

 

 [facility]  [id number] [address]-
6550  15 Oct, 2009   

 

 [facility]  [id number] [address]-
0690  15 Oct, 2009   

 

 [facility]  [id number] [address]
1446     







IMMUNIZATIONS

No Known Immunizations



SOCIAL HISTORY

Never Assessed



REASON FOR VISIT

Diabetes, PT reports she would like to discuss some diabetic questions. -
Ned MACIAS , PT wants to know her blood type -Ned MACIAS 



PLAN OF CARE







 Activity  Details









  









 Follow Up  3 Months Reason:DM







VITAL SIGNS







 Height  62 in  2018

 

 Weight  136.6 lbs  2018

 

 Temperature  97.7 degrees Fahrenheit  2018

 

 Heart Rate  90 bpm  2018

 

 Respiratory Rate  20   2018

 

 Oximetry  99 %  2018

 

 BMI  24.98 kg/m2  2018

 

 Blood pressure systolic  128 mmHg  2018

 

 Blood pressure diastolic  68 mmHg  2018







MEDICATIONS







 Medication  Instructions  Dosage  Frequency  Start Date  End Date  Duration  
Status

 

 MetFORMIN HCl  mg     TAKE TWO TABLETS BY MOUTH TWICE DAILY WITH MEALS.  
MUST BE TEVA BRAND           30 days  Active

 

 Amaryl 2 MG     TAKE TWO TABLETS BY MOUTH TWICE DAILY           90 days  Active

 

 Glucocard Expression Monitor w/Device     as directed     08 May, 2017        
Active

 

 Potassium 99 MG  Orally Once a day  1 tablet  24h           Active

 

 Ibuprofen 800 MG  Orally Once a day  1 tablet  24h           Active







RESULTS

No Results



PROCEDURES

No Known procedures



INSTRUCTIONS





MEDICATIONS ADMINISTERED

No Known Medications



MEDICAL (GENERAL) HISTORY







 Type  Description  Date

 

 Medical History  hypothyroidism   

 

 Medical History  type II diabetes   

 

 Medical History  back pain   

 

 Medical History  hyperlipidemia   

 

 Medical History  anxiety   

 

 Medical History  mood disorder   

 

 Medical History  heart murmur   

 

 Medical History  chronic bronchitis   

 

 Medical History  [place]   

 

 Surgical History  appendectomy   

 

 Surgical History  gallbladder removal   

 

 Hospitalization History  childbirth   

 

 Hospitalization History  pancreatitis  

 

 Hospitalization History  appendectomy   

 

 Hospitalization History  Anaphylaxis to Bactrim

## 2019-01-30 NOTE — XMS REPORT
[facility]

 Created on: 2017



Libra Galvez

External Reference #: [id number]

: 1965

Sex: Female



Demographics







 Address  [address]  [address]

 

 Preferred Language  Unknown

 

 Marital Status  Unknown

 

 Sabianism Affiliation  Unknown

 

 Race  Unknown

 

 Ethnic Group  Unknown





Author







 Author  ANIKET MAYFIELD

 

 Organization  [facility]

 

 Address  [address] [place]  [address]



 

 Phone  [phone]







Care Team Providers







 Care Team Member Name  Role  Phone

 

 ANIKET MAYFIELD  Unavailable  [phone]







PROBLEMS







 Type  Condition  ICD9-CM Code  XHV06-JS Code  Onset Dates  Condition Status  
SNOMED Code

 

 Problem  Acquired hypothyroidism     E03.9     Active  253266858

 

 Problem  Diabetes     E11.9     Active  580050654

 

 Problem  Anxiety disorder, unspecified     F41.9     Active  [id number]







ALLERGIES







 Substance  Reaction  Event Type  Date  Status

 

 Sulfamethoxazole-Trimethoprim  anaphylaxis  Drug Allergy  08 May, 2017  Active







SOCIAL HISTORY

Never Assessed



PLAN OF CARE







 Activity  Details









  









 Follow Up  4 Months Reason:DM







VITAL SIGNS







 Height  62 in  2017

 

 Weight  148.9 lbs  2017

 

 Temperature  98.1 degrees Fahrenheit  2017

 

 Heart Rate  82 bpm  2017

 

 Respiratory Rate  20   2017

 

 BMI  27.23 kg/m2  2017

 

 Blood pressure systolic  124 mmHg  2017

 

 Blood pressure diastolic  74 mmHg  2017







MEDICATIONS







 Medication  Instructions  Dosage  Frequency  Start Date  End Date  Duration  
Status

 

 MetFORMIN HCl  MG     TAKE TWO TABLETS BY MOUTH TWICE DAILY WITH MEALS.  
MUST BE TEVA BRAND           30  Active

 

 Ibuprofen 800 MG  Orally Once a day  1 tablet  24h           Active

 

 Cinnamon 500 MG  Orally 2 times a day  2 capsules  12h           Active

 

 Potassium 99 MG  Orally Once a day  1 tablet  24h           Active

 

 Glucocard Expression Test -     as directed  12h  08 May, 2017  5 Sep, 2017  
60 days  Active

 

 Dae Contour Test -  DEK04-D29.9 2 times a day- 3 times weekly.  test blood 
sugar             Active

 

 Glucocard Expression Monitor w/Device     as directed     08 May, 2017        
Active

 

 Xanax 1 MG  Orally Once a day  1 tablet  24h       28 days  Active

 

 Amaryl 2 MG     TAKE TWO TABLETS BY MOUTH TWICE DAILY           30  Active







RESULTS







 Name  Result  Date  Reference Range

 

 A1C (IN HOUSE)     2017   

 

 A1C IN HOUSE  9.6     4.3 - 5.6 %

 

 Previous A1c  10.0      

 

 Lot   [id number]      

 

 Exp date  2019      

 

 MICROALBUMIN, URINE (IN HOUSE)     2017   

 

 MICROALBUMIN  normal      

 

 Lot #  [id number]      

 

 Exp date  3-18      

 

 Clarity  clear      

 

 Color  yellow      

 

 ALB  30      

 

 CRE  200      

 

 A:C (IN HOUSE)  <30      

 

 Control  +      

 

 Control         

 

 Lot #         

 

 Exp date         

 

 TSH     2017   

 

 TSH  2.320     0.450-4.500

 

 CMP     2017   

 

 Glucose, Serum  337     65-99

 

 BUN  14     6-24

 

 Creatinine, Serum  0.80     0.57-1.00

 

 eGFR If NonAfricn Am  86         >59

 

 eGFR If Africn Am  99         >59

 

 BUN/Creatinine Ratio  18     9-23

 

 Sodium, Serum  138     134-144

 

 Potassium, Serum  4.4     3.5-5.2

 

 Chloride, Serum  97     

 

 Carbon Dioxide, Total  24     18-29

 

 Calcium, Serum  9.8     8.7-10.2

 

 Protein, Total, Serum  7.6     6.0-8.5

 

 Albumin, Serum  4.3     3.5-5.5

 

 Globulin, Total  3.3     1.5-4.5

 

 A/G Ratio  1.3     1.2-2.2

 

 Bilirubin, Total  0.2     0.0-1.2

 

 Alkaline Phosphatase, S  99     

 

 AST (SGOT)  23     0-40

 

 ALT (SGPT)  35     0-32







PROCEDURES







 Procedure  Date Ordered  Result  Body Site

 

 GLYCATED HEMOGLOBIN TEST  May 08, 2017      

 

 MICROALBUMIN, SEMIQUANT  May 08, 2017      

 

 COMPREHEN METABOLIC PANEL  May 08, 2017      

 

 ASSAY THYROID STIM HORMONE  May 08, 2017      

 

 VENIPUNCT, ROUTINE*  May 08, 2017      







IMMUNIZATIONS

No Known Immunizations



MEDICAL (GENERAL) HISTORY







 Type  Description  Date

 

 Medical History  hypothyroidism   

 

 Medical History  type II diabetes   

 

 Medical History  back pain   

 

 Medical History  hyperlipidemia   

 

 Medical History  anxiety   

 

 Medical History  mood disorder   

 

 Medical History  heart murmur   

 

 Medical History  chronic bronchitis   

 

 Medical History  [place]   

 

 Surgical History  appendectomy   

 

 Hospitalization History  childbirth   

 

 Hospitalization History  pancreatitis  2005

 

 Hospitalization History  appendectomy   

 

 Hospitalization History  Anaphylaxis to Bactrim  2016

## 2019-01-30 NOTE — XMS REPORT
[facility]

 Created on: 2015



Libra Galvez

External Reference #: [id number]

: 1965

Sex: Female



Demographics







 Address  [address]  [address]

 

 Home Phone  [phone]

 

 Preferred Language  Unknown

 

 Marital Status  Unknown

 

 Jainism Affiliation  Unknown

 

 Race  White

 

 Ethnic Group  Not  or 





Author







 Author  ANIKET MAYFIELD

 

 Organization  [employer]

 

 Address  Unknown

 

 Phone  Unavailable







Care Team Providers







 Care Team Member Name  Role  Phone

 

 ANIKET MAYFIELD  CP  Unavailable



                                                                



Allergies

          No Known Allergies                                                   
                                     



Problems

          





 Problem Type  Condition  Code  Onset Dates  Condition Status

 

 Problem  Pure hyperglyceridemia  272.1     Active

 

 Problem  Dysuria  788.1     Active

 

 Problem  Other bursitis disorders  727.3     Active

 

 Problem  Diabetes  250.00     Active

 

 Problem  Unspecified episodic mood disorder  296.90     Active

 

 Problem  Anxiety state, unspecified  300.00     Active

 

 Problem  Lumbago  724.2     Active

 

 Problem  Other chronic nonalcoholic liver disease  571.8     Active



                                                                               
                                                                               



Medications

          





 Medication  Code System  Code  Instructions  Start Date  End Date  Status  
Dosage

 

 Xanax  NDC  [address]-84  1 MG Orally Once a day  May 12, 2015        1 tablet



                                                                              



Results

          No Known Results                                                     
               



Summary Purpose

          [employer] Submission

## 2019-01-30 NOTE — XMS REPORT
[facility]

 Created on: 2018



Libra Galvez

External Reference #: [id number]

: 1965

Sex: Female



Demographics







 Address  [address]  [address]

 

 Preferred Language  Unknown

 

 Marital Status  Unknown

 

 Baptism Affiliation  Unknown

 

 Race  Unknown

 

 Ethnic Group  Unknown





Author







 Author  MAURO TAM  [facility]

 

 Address  Unknown

 

 Phone  [phone]







Care Team Providers







 Care Team Member Name  Role  Phone

 

 MAURO TAM  Unavailable  [phone]







PROBLEMS







 Type  Condition  ICD9-CM Code  HLT38-KZ Code  Onset Dates  Condition Status  
SNOMED Code

 

 Problem  Anxiety disorder, unspecified     F41.9     Active  050163336

 

 Problem  Violation of controlled substance agreement     Z91.14     Active  
[id number]

 

 Problem  Intestinal malabsorption, unspecified     K90.9     Active  93202838

 

 Problem  Acquired hypothyroidism     E03.9     Active  979599223

 

 Problem  Diabetes     E11.9     Active  928728772

 

 Problem  Hypertriglyceridemia     E78.1     Active  927054920

 

 Problem  Status post cholecystectomy     Z90.49     Active  512908403







ALLERGIES







 Substance  Reaction  Event Type  Date  Status

 

 Sulfamethoxazole-Trimethoprim  anaphylaxis  Drug Allergy  10 Jul, 2018  Active







ENCOUNTERS







 Encounter  Location  Date  Diagnosis

 

 [facility]  [address] [address]-
5338  01 Oct, 2018   

 

 [facility]  [address] [address]-
2412  29 Aug, 2018  Yeast infection B37.9

 

 [facility]  [address] [address]-
9802  21 Aug, 2018   

 

 [facility]  [address] [address]-
3951    Intestinal malabsorption, unspecified K90.9 ; Diarrhea, 
unspecified R19.7 ; Diabetes E11.9 and Marijuana smoker F12.90

 

 [facility]  [id number] [place] [address] 
[id number]    Dental examination Z01.20

 

 [facility]  [id number] [place] [address] 
[id number]  10 Jul, 2018  Dental examination Z01.20 and Dental caries K02.9

 

 [facility]  [address] [address]-
8801    Benzodiazepine use agreement exists Z02.89 ; Therapeutic 
drug monitoring Z51.81 and Violation of controlled substance agreement Z91.14

 

 [place] [address] [address]-
64[address]  05 2018   

 

 [place] [address] [address]-
2432    Ketoacidosis E87.2 ; Status post cholecystectomy Z90.49 ; 
Diabetes E11.9 ; Acquired hypothyroidism E03.9 ; Hypertriglyceridemia E78.1 and 
Vaginal yeast infection B37.3

 

 [place] [address] [address]-
5249  15 May, 2018   

 

 [place] [address] [address]-
7004     

 

 [place] [address] [address]-
0334  20 Mar, 2018   

 

 [facility]  [address] [address]-
2819     

 

 [place] [address] [address]-
5392     

 

 [place] [address] [address]-
7613  10 Andrei, 2018  Diabetes E11.9 and Drug-induced acute pancreatitis with 
uninfected necrosis K85.31

 

 [place] [address] [address]-
6920  27 Dec, 2017   

 

 [facility]  [address] [address]-
3916     

 

 [facility]  [address] [address]
-[address]  10 Nov, 2017  Crushing injury of right foot, initial encounter S97.81XA

 

 [place] [address] [address]-
4503  27 Sep, 2017   

 

 [facility]  [address]65[address] [address]-
4726  21 Sep, 2017   

 

 [facility]  [address]65[address] [address]
-[address]377  19 Sep, 2017  Acute non-recurrent pansinusitis J01.40

 

 [facility]  [address]65100[place] [address]-
3767  19 Sep, 2017   

 

 [facility]  [id number] [place] [address]-
4786  04 Aug, 2017   

 

 [facility]  [id number] [place] [address]-
5152     

 

 [facility]  [id number] [place] [address]-
1234  26 May, 2017   

 

 [facility]  [id number] [place] [address]-
6427  08 May, 2017  Diabetes E11.9 ; Anxiety disorder, unspecified F41.9 and 
Acquired hypothyroidism E03.9

 

 [facility]  [address]65100[place] [address]-
6069  01 May, 2017   

 

 [facility]  [id number] [place] [address]-
3559     

 

 [facility]  [address]65100[place] [address]-
0591     

 

 [facility]  [id number] [address]-
4518  06 Mar, 2017   

 

 [facility]  [address]65[address] [address]-
8727     

 

 [facility]  [id number] [place] [address]-
8804     

 

 [facility]  [address]65100[place] [address]-
8724     

 

 [facility]  [id number] [address]-
4846     

 

 [facility]  [id number] [place] [address]-
7871    Acute non-recurrent maxillary sinusitis J01.00

 

 [facility]  [address] [place] [address]-
3384     

 

 [facility]  [id number] [place] [address]23-
3284     

 

 [facility]  [id number] [place] [address]-
8501  12 Dec, 2016   

 

 [facility]  [id number] [place] [address] 65[address]-
0297  15 Nov, 2016   

 

 [facility]  [address] [place] [address]62-
5669  12 Oct, 2016  Diabetes E11.9

 

 [facility]  [address] [place] [address]76-
7313  28 Sep, 2016  Pelvic pain R10.2 ; Leukocytosis, unspecified D72.829 ; 
Constipation, unspecified constipation type K59.00 and History of pancreatitis 
Z87.19

 

 [facility]  [address]65100[place] [address]-
0836  22 Sep, 2016   

 

 [facility]  [address] [place] [address]-
7522  14 Sep, 2016  Diabetes E11.9

 

 [facility]  [address]100[place] [address]-
0096  13 Sep, 2016   

 

 [facility]  [address] [place] [address]-
8326  09 Sep, 2016  Vaginal yeast infection B37.3

 

 [facility]  [address] [place] [address] 19[address]-
1147  08 Sep, 2016   

 

 [facility]  [address] [place] [address]-
9480  30 Aug, 2016  Diabetes E11.9

 

 [facility]  [address]65100[place] [address]-
7147  25 Aug, 2016  Anxiety disorder, unspecified F41.9

 

 [facility]  [address] [place] [address]-
5513  17 Aug, 2016  Vaginal yeast infection B37.3

 

 [facility]  [address] [place] [address]-
9714  17 Aug, 2016   

 

 [facility]  [address] [place] [address]-
1411    Anxiety disorder, unspecified F41.9

 

 [facility]  [id number] [place] [address]-
7075    Vaginal yeast infection B37.3

 

 [facility]  [address] [place] [address]-
0457    Anxiety disorder, unspecified F41.9

 

 [facility]  [address] [place] [address] 72762-
1822    Anxiety disorder, unspecified F41.9

 

 [facility]  [address] [place] [address]-
2051  06 May, 2016  Anxiety disorder, unspecified F41.9

 

 [facility]  [address] [address]-
0355  04 May, 2016  Diabetes E11.9

 

 [facility]  [address] [place] [address] 9[address]-
3865    Anxiety disorder, unspecified F41.9

 

 [facility]  [address] [place] [address] 6[address]-
3868     

 

 [facility]  [address] [place] [address] 350595-
7027  25 Mar, 2016  Vaginal yeast infection B37.3

 

 [facility]  [address] [address]-
7803  15 Mar, 2016   

 

 [facility]  [address] [address]-
9685     

 

 [facility]  [address] [place] [address] 592377-
0707     

 

 [facility]  [address] [place] [address] 37044-
3448     

 

 [facility]  [address] [place] [address] 75451-
0958  15 Andrei, 2016   

 

 [facility]  [address] [place] [address] 757763-
2249  22 Dec, 2015   

 

 [facility]  [address] [place] [address] 997756-
8534    Diabetes E11.9 ; Acquired hypothyroidism E03.9 ; 
Hyperlipidemia, unspecified hyperlipidemia E78.5 and Anxiety F41.9

 

 [facility]  [address] [place] [address] 51897-
0383     

 

 [facility]  [address] [place] [address] 28570-
7910  27 Oct, 2015   

 

 [facility]  [address] [place] [address] 36808302-
1856  29 Sep, 2015   

 

 [facility]  [address] [place] [address] 68158-
7168  01 Sep, 2015   

 

 [facility]  [address] [place] [address] 9[address]50-
0577  04 Aug, 2015   

 

 [facility]  [address] [place] [address] 35263-
5369  15 Jul, 2015   

 

 [facility]  [address] [place] [address]-
6912    DUB (dysfunctional uterine bleeding) 626.8 and Pap test, as 
part of routine gynecological examination V76.2

 

 [facility]  [address] [place] [address]-
4313     

 

 [facility]  [address] [id number] [place] [address]-
4591     

 

 [facility]  [address] [id number] [place] [address]-
7466     

 

 [facility]  [address] [id number] [place] [address]-
1596     

 

 [facility]  [address] [id number] [place] [address]-
3570  15 Eagle, 2015  Diabetes 250.00

 

 [facility]  [address] [id number] [place] [address]-
0406     

 

 [facility]  [address] [id number] [place] [address]-
1456  19 May, 2015   

 

 [facility]  [address] [id number] [place] [address]-
1812  13 May, 2015  Diabetes 250.00

 

 [facility]  [address] [id number] [place] [address]-
8440  12 May, 2015   

 

 [facility]  [address] [id number] [place] [address]-
4581  07 May, 2015   

 

 [facility]  [address] [id number] [place] [address]-
6794  07 May, 2015   

 

 [facility]  [address] [id number] [place] [address]-
2186  05 May, 2015   

 

 [facility]  [address] [id number] [place] [address]-
6626  01 May, 2015   

 

 [facility]  [address] [id number] [place] [address]-
3164     

 

 [facility]  [address] [id number] [place] [address]-
1947     

 

 [facility]  [address] [id number] [place] [address]-
0931     

 

 [facility]  [address] [id number] [place] [address]-
4101  17 Mar,    

 

 [facility] [place] [facility]  [address] [id number] [place] [address]-
6041  17 Mar,    

 

 [facility] [place] [facility]  [address] [id number] [place] [address]-
3916  17 Mar,    

 

 [facility] [place] [facility]  [address] [id number] [place] [address]-
2230  17 Mar,    

 

 [facility] [place] [facility]  [address] [id number] [place] [address]-
8310  09 Mar,    

 

 [facility] [place] [facility]  [address] [id number] [place] [address]-
1602  09 Mar,    

 

 [facility] [place] [facility]  [address] [id number] [place] [address]-
2636  06 Mar,    

 

 [facility] [place] [facility]  [address] [id number] [place] [address]-
4053  03 Mar,    

 

 [facility] [place] [facility]  [address] [id number] [place] [address]-
4137  03 Mar,    

 

 [facility] [place] [facility]  [address] [id number] [place] [address]-
2653  ,    

 

 [facility] [place] [facility]  [address] [facility] [id number] [place] [address]-
2595  ,    

 

 [facility] [place] [facility]  [address] [id number] [place] [address]-
2406  ,    

 

 [facility] [place] [facility]  [address] [facility] [id number] [place] [address]-
2546  ,    

 

 [facility] [place] [facility]  [address] [id number] [place] [address]-
9086  ,    

 

 [facility] [place] [facility]  [address] [facility] [id number] [place] [address]-
2546  ,    

 

 [facility] [place] [facility]  [address] [facility] [id number] [place] [address]-
2546     

 

 [facility] [place] [facility]  [address] [id number] [place] [address]-
5072     

 

 [facility] [place] [facility]  [address] [id number] [place] [address]-
3086     

 

 [facility] [place] [facility]  [address] [id number] [place] [address]-
0568     

 

 [facility] [place] [facility]  [address] [id number] [place] [address]-
1061     

 

 [facility] [place] [facility]  [address] [id number] [place] [address]-
9511     

 

 [facility] [place] [facility]  [address] [id number] [place] [address]-
6985     

 

 [facility] [place] [facility]  [address] [id number] [place] [address]-
4943     

 

 [facility] [place] [facility]  [address] [id number] [place] [address]-
9960     

 

 [facility] [place] [facility]  [address] [id number] [place] [address]-
2588     

 

 [facility] [place] [facility]  [address] [id number] [place] [address]-
8007     

 

 [facility] [place] [facility]  [address] [id number] [place] [address]-
8012     

 

 [facility] [place] [facility]  [address] [id number] [place] [address]-
0596     

 

 [facility] [place] [facility]  [address] [id number] [place] [address]-
0783     

 

 [facility] [place] [facility]  [address] [id number] [place] [address]-
3336  23 Dec, 2014   

 

 [facility] [place] [facility]  [address] [id number] [place] [address]-
1159  23 Dec, 2014   

 

 [facility] [place] [facility]  [address] [id number] [place] [address]-
0075  22 Dec, 2014   

 

 [facility] [place] [facility]  [address] [id number] [place] [address]-
3043  22 Dec, 2014   

 

 [facility] [place] [facility]  [address] [id number] [place]
8406  17 Dec, 2014   

 

 [facility] [place] [facility]  [address] [id number] [place]
8956  17 Dec, 2014   

 

 [facility] [place] [facility]  [address] [id number] [place] [address]-
6702  15 Dec, 2014   

 

 [facility] [place] [facility]  [address] [id number] [place] [address]-
2741  15 Dec, 2014   

 

 [facility] [place] [facility]  [address] [id number] [place] [address]-
0171  12 Dec, 2014   

 

 [facility] [place] [facility]  [address] [id number] [place] [address]-
3138  08 Dec, 2014   

 

 [facility] [place] [facility]  [address] [id number] [place] [address]-
9659  08 Dec, 2014   

 

 [facility] [place] [facility]  [address] [id number] [place] [address]-
8322  08 Dec, 2014   

 

 [facility] [place] [facility]  [address] [facility] [id number] [place] [address]-
1346  08 Dec, 2014   

 

 [facility] [place] [facility]  [address] [id number] [place] [address]-
0981     

 

 [facility] [place] [facility]  [address] [id number] [place] [address]-
3333     

 

 [facility] [place] [facility]  [address] [id number] [place] [address]-
5277     

 

 [facility] [place] [facility]  [address] [id number] [place] [address]-
2921  13 2014   

 

 [facility] [place] [facility]  [address] [id number] [place]-
3038  10 Nov, 2014   

 

 [facility] [place] [facility]  [address] [id number] [place] [address]-
9125  10 Nov, 2014   

 

 [facility] [place] [facility]  [address] [id number] [place] [address]-
8310  07 Oct, 2014   

 

 [facility] [place] [facility]  [address] [id number] [place]-
3485  07 Oct, 2014   

 

 [facility] [place] [facility]  [address] [id number] [place] [address]-
4342  01 Oct, 2014   

 

 [facility] [place] [facility]  [address] [id number] [place] [address]-
9646  01 Oct, 2014   

 

 [facility] [place] [facility]  [address] [id number] [place] [address]-
0024  24 Sep, 2014   

 

 [facility] [place] [facility]  [address] [id number] [place] [address]-
0677  24 Sep, 2014   

 

 [facility] [place] [facility]  [address] [id number] [place] [address]-
9564  23 Sep, 2014   

 

 [facility] [place] [facility]  [address] [id number] [place] [address]-
4460  23 Sep, 2014   

 

 [facility] [place] [facility]  [address] [id number] [place] [address]-
4691  22 Sep, 2014   

 

 [facility] [place] [facility]  [address] [id number] [place] [address]-
9676  22 Sep, 2014   

 

 [facility] [place] [facility]  [address] [id number] [place] [address]-
0294  19 Aug, 2014   

 

 [facility] [place] [facility]  [address] [id number] [place] [address]-
0099  19 Aug, 2014   

 

 [facility] [place] [facility]  [address] [id number] [place] [address]-
8100     

 

 [facility] [place] [facility]  [address] [id number] [place] [address]-
0096     

 

 [facility] [place] [facility]  [address] [id number] [place] [address]-
5033  10 Eagle, 2014   

 

 [facility] [place] [facility]  [address] [id number] [place] [address]-
6768     

 

 [facility] [place] [facility]  [address] [id number] [place]
4187     

 

 [facility] [place] [facility]  [address] [id number] [place] [address]-
1237  07 May, 2014   

 

 [facility] [place] [facility]  [address] [id number] [place] [address]-
2854  07 May, 2014   

 

 [facility] [place] [facility]  [address] [id number] [place] [address]-
2565     

 

 [facility] [place] [facility]  [address] [id number] [place] [address]-
0299     

 

 [facility] [place] [facility]  [address] [id number] [place] [address]-
1874     

 

 [facility] [place] [facility]  [address] [id number] [place] [address]-
5583     

 

 [facility] [place] [facility]  [address] [id number] [place] [address]-
5944     

 

 [facility] [place] [facility]  [address] [id number] [place] [address]-
8100     

 

 [facility] [place] [facility]  [address] [id number] [place] [address]-
4770     

 

 [facility] [place] [facility]  [address] [id number] [place] [address]-
[address]     

 

 [facility] [place] [facility]  [address] [id number] [place] [address]-
7244     

 

 [facility] [place] [facility]  [address] [id number] [place] [address]-
[address]     

 

 [facility] [place] [facility]  [address] [id number] [place] [address]-
9010  17 Mar, 2014   

 

 [facility] [place] [facility]  [address] [id number] [place] [address]-
2546  17 Mar, 2014   

 

 [facility]  [address] [id number] [place] [address]-
8516     

 

 [facility] [place] [facility]  [address] [id number] [place] [address]-
2546     

 

 [facility]  [address] [id number] [place] [address]-
2546     

 

 [facility] [place] [facility]  [address] [id number] [place] [address]-
2546     

 

 [facility] [place] [facility]  [address] [id number] [place] [address]-
8306  25 Oct, 2013   

 

 [facility] [place] [facility]  [address] [id number] [place] [address]-
9786  25 Oct, 2013   

 

 [facility] [place] [facility]  [address] [id number] [place] [address]-
2546  23 Sep, 2013   

 

 [facility]  [address] [id number] [place] [address]-
2546  06 Aug, 2013   

 

 [facility] [facility]  [address] [id number] [place] [address]-
2546  05 Aug, 2013   

 

 [facility]  [address] [id number] [place] [address]-
2546     

 

 [facility] [facility]  [address] [id number] [place] [address]-
2546     

 

 [facility] [place] [facility]  [address] [id number] [place] [address]-
2546     

 

 [facility] [place] [facility]  [address] [id number] [place] [address]-
2546  28 Dec, 2012   

 

 [facility] [place] [facility]  [address] [id number] [place] [address]-
2546  19 Dec, 2012   

 

 [facility] [place] [facility]  [address] [id number] [place] [address]-
4309  19 Dec, 2012   

 

 [facility] [place] [facility]  [address] [id number] [place] [address]-
6525  13 Dec, 2012   

 

 [facility] [place] [facility]  [address] [id number] [place] [address]-
2497  13 Dec, 2012   

 

 [facility] [place] [facility]  [address] [id number] [place]
9277     

 

 [facility] [place] [facility]  [address] [id number] [place] [address]-
2046     

 

 [facility] [place] [facility]  [address] [id number] [place] [address]-
0161  19 Oct, 2012   

 

 [facility] [place] [facility]  [address] [id number] [place] [address]-
5392  18 Oct, 2012   

 

 [facility] [place] [facility]  [address] [id number] [place] [address]-
4455  17 Oct, 2012   

 

 [facility] [place] [facility]  [address] [id number] [place] [address]-
0746  17 Oct, 2012   

 

 [facility] [place] [facility]  [address] [id number] [place] [address]-
2838  16 Oct, 2012   

 

 [facility] [place] [facility]  [address] [facility] [id number] [address]-
4915  16 Oct, 2012   

 

 [facility] [facility]  [address] [id number] [place] [address]-
2220  15 Oct, 2012   

 

 [facility] [facility]  [address] [id number] [place] [address]-
8734  27 Sep, 2012   

 

 [facility] [place] [facility]  [address] [id number] [place] [address]
0508  26 Sep, 2012   

 

 [facility] [facility]  [address] [id number] [address]-
4519     

 

 [facility] [place] [facility]  [address] [facility] [id number] [place] [address]-
4320     

 

 [facility] [facility]  [address] [id number] [place] [address]-
5566     

 

 [facility] [facility]  [address] [id number] [place] [address]-
6023     

 

 [facility] [place] [facility]  [address] [id number] [place] [address]-
7466     

 

 [facility] [facility]  [address] [id number] [place] [address]-
0936     

 

 [facility] [facility]  [address] [id number] [place] [address]-
6354     

 

 [facility] [place] [facility]  [address] [id number] [place] [address]-
3810     

 

 [facility] [facility]  [address] [id number] [place] [address]-
4236  01 Mar, 2012   

 

 [facility] [place] [facility]  [address] [id number] [place] [address]-
2758     

 

 [facility] [facility]  [address] [id number] [place] [address]-
3405     

 

 [facility] [facility]  [address] [id number] [place] [address]-
8269     

 

 [facility] [place] [facility]  [address] [id number] [place] [address]-
9240  10 Andrei, 2012   

 

 [facility] [place] [facility]  [address] [id number] [place] [address]-
1622     

 

 [facility] [place] [facility]  [address] [id number] [place] [address]-
1733  20 Dec, 2011   

 

 [facility] [place] [facility]  [address] [id number] [place] [address]-
5404  20 Dec, 2011   

 

 [facility] [place] [facility]  [address] [id number] [place] [address]-
0356  13 Dec, 2011   

 

 [facility] [facility]  [address] [id number] [place] [address]-
0199  08 Dec, 2011   

 

 [facility]  [address]65[address] [address]-
7494  08 Dec, 2011   

 

 [facility]  [id number] [place] [address]-
8522  06 Dec, 2011   

 

 [facility]  [address]65100[place] [address]-
9890  10 Oct, 2011   

 

 [facility]  [id number] [place] [address]-
9525  10 Oct, 2011   

 

 [facility]  [id number] [place] [address]-
6544     

 

 [facility]  [id number] [place] [address]-
9863  20 Dec, 2010   

 

 [facility]  [id number] [place] [address]-
4323  20 Dec, 2010   

 

 [facility]  [id number] [place] [address]-
1684     

 

 [facility]  [id number] [place] [address]-
0346     

 

 [facility]  [id number] [place] [address]-
6956  15 Oct, 2009   

 

 [facility]  [address] [place] [address]-
4753  15 Oct, 2009   

 

 [facility]  [id number] [place] [address]-
6355     







IMMUNIZATIONS

No Known Immunizations



SOCIAL HISTORY

Never Assessed



REASON FOR VISIT

SERENA/TE



PLAN OF CARE







 Activity  Details









  









 Follow Up  prn Reason:hygiene







VITAL SIGNS







 Blood pressure systolic  106 mmHg  2018-07-10

 

 Blood pressure diastolic  62 mmHg  2018-07-10







MEDICATIONS







 Medication  Instructions  Dosage  Frequency  Start Date  End Date  Duration  
Status

 

 Amaryl 2 MG     TAKE TWO TABLETS BY MOUTH TWICE DAILY           90 days  Active

 

 MetFORMIN HCl  mg     TAKE TWO TABLETS BY MOUTH TWICE DAILY WITH MEALS.  
MUST BE TEVA BRAND           30 days  Active

 

 Ibuprofen 800 MG  Orally Once a day  1 tablet  24h           Active

 

 Glucocard Expression Monitor w/Device     as directed     08 May, 2017        
Active

 

 Potassium 99 MG  Orally Once a day  1 tablet  24h           Active

 

 Cinnamon 500 MG  Orally 2 times a day  2 capsules  12h           Active







RESULTS

No Results



PROCEDURES







 Procedure  Date Ordered  Result  Body Site

 

 LTD ORAL EVALUATION - PROBLEM FOCUS  July 10, 2018      

 

 INTRAORL-PERIAPICAL 1 FILM 81671  July 10, 2018      

 

 SURG REMOVAL ERUPTED TOOTH  July 10, 2018      

 

 BITEWING - SINGLE FILM  July 10, 2018      







INSTRUCTIONS





MEDICATIONS ADMINISTERED

No Known Medications



MEDICAL (GENERAL) HISTORY







 Type  Description  Date

 

 Medical History  hypothyroidism   

 

 Medical History  type II diabetes   

 

 Medical History  back pain   

 

 Medical History  hyperlipidemia   

 

 Medical History  anxiety   

 

 Medical History  mood disorder   

 

 Medical History  heart murmur   

 

 Medical History  chronic bronchitis   

 

 Medical History  [place]   

 

 Surgical History  appendectomy   

 

 Surgical History  gallbladder removal   

 

 Hospitalization History  childbirth   

 

 Hospitalization History  pancreatitis  2005

 

 Hospitalization History  appendectomy   

 

 Hospitalization History  Anaphylaxis to Bactrim  2016

## 2019-01-30 NOTE — XMS REPORT
[facility]

 Created on: 2016



Libra Galvez

External Reference #: [id number]

: 1965

Sex: Female



Demographics







 Address  [address]  [address]

 

 Home Phone  [phone]

 

 Preferred Language  Unknown

 

 Marital Status  Unknown

 

 Buddhist Affiliation  Unknown

 

 Race  White

 

 Ethnic Group  Not  or 





Author







 Author  ANIKET MAYFIELD

 

 Organization  [employer]

 

 Address  Unknown

 

 Phone  Unavailable







Care Team Providers







 Care Team Member Name  Role  Phone

 

 ANIKET MAYFIELD  CP  Unavailable



                                                                



Allergies

          No Known Allergies                                                   
                                     



Problems

          





 Problem Type  Condition  Code  Onset Dates  Condition Status

 

 Problem  Pure hyperglyceridemia  272.1     Active

 

 Problem  Dysuria  788.1     Active

 

 Problem  Other bursitis disorders  727.3     Active

 

 Problem  Diabetes  250.00     Active

 

 Problem  Unspecified episodic mood disorder  296.90     Active

 

 Problem  Anxiety state, unspecified  300.00     Active

 

 Problem  Lumbago  724.2     Active

 

 Problem  Other chronic nonalcoholic liver disease  571.8     Active



                                                                               
                                                                               



Medications

          





 Medication  Code System  Code  Instructions  Start Date  End Date  Status  
Dosage

 

 Xanax  NDC  [address]-32  1 MG Orally Once a day  May 12, 2015        1 tablet



                                                                              



Results

          No Known Results                                                     
               



Summary Purpose

          [employer] Submission

## 2019-01-30 NOTE — XMS REPORT
[facility]

 Created on: 2018



Libra Galvez

External Reference #: [id number]

: 1965

Sex: Female



Demographics







 Address  [address]  [address]

 

 Preferred Language  Unknown

 

 Marital Status  Unknown

 

 [employer] Affiliation  Unknown

 

 Race  Unknown

 

 Ethnic Group  Unknown





Author







 Author  ANIKET MAYFIELD

 

 Organization  [facility]

 

 Address  [address] [place]  [address]



 

 Phone  [phone]







Care Team Providers







 Care Team Member Name  Role  Phone

 

 ANIKET MYAFIELD  Unavailable  [phone]







PROBLEMS







 Type  Condition  ICD9-CM Code  SAN68-MX Code  Onset Dates  Condition Status  
SNOMED Code

 

 Problem  Anxiety disorder, unspecified     F41.9     Active  289425262

 

 Problem  Violation of controlled substance agreement     Z91.14     Active  
160121223

 

 Problem  Intestinal malabsorption, unspecified     K90.9     Active  43769570

 

 Problem  Acquired hypothyroidism     E03.9     Active  274375209

 

 Problem  Diabetes     E11.9     Active  046256461

 

 Problem  Hypertriglyceridemia     E78.1     Active  929767500

 

 Problem  Status post cholecystectomy     Z90.49     Active  669058528







ALLERGIES







 Substance  Reaction  Event Type  Date  Status

 

 Sulfamethoxazole-Trimethoprim  anaphylaxis  Drug Allergy    Active







ENCOUNTERS







 Encounter  Location  Date  Diagnosis

 

 [facility]  [id number] [place] [address]-
3863  01 Oct, 2018   

 

 [facility]  [id number] [place] [address] 48675-
3005  21 Aug, 2018   

 

 [facility]  [id number] [place] [address]111-
5688    Intestinal malabsorption, unspecified K90.9 ; Diarrhea, 
unspecified R19.7 ; Diabetes E11.9 and Marijuana smoker F12.90

 

 [facility]  [id number] 
[id number]    Dental examination Z01.20

 

 [facility]  [address] [place] [address] 
[id number]  10 Jul, 2018  Dental examination Z01.20 and Dental caries K02.9

 

 [facility]  [id number] [place] [address] 74505-
3202  02 Jul, 2018  Benzodiazepine use agreement exists Z02.89 ; Therapeutic 
drug monitoring Z51.81 and Violation of controlled substance agreement Z91.14

 

 [place] [id number] [place] [address]556-
8034     

 

 [facility]  [id number] [place] [address] 32220-
6747  04 2018  Ketoacidosis E87.2 ; Status post cholecystectomy Z90.49 ; 
Diabetes E11.9 ; Acquired hypothyroidism E03.9 ; Hypertriglyceridemia E78.1 and 
Vaginal yeast infection B37.3

 

 [place] [address] [address]-
0121  15 May, 2018   

 

 [place] [address] [address]-
6162     

 

 [facility]  [address] [address]-
4795  20 Mar, 2018   

 

 [facility]  [address] [address]-
4724     

 

 [facility]  [id number] [place] [address] 24197-
8741     

 

 [facility]  [address] [address]-
6330  10 Andrei, 2018  Diabetes E11.9 and Drug-induced acute pancreatitis with 
uninfected necrosis K85.31

 

 [place] [id number] [place] [address] 16730-
0455  27 Dec, 2017   

 

 [facility]  [id number] [place] [address] 48265-
4501     

 

 [facility]  [id number] [place] [address] 62890
-5296  10 Nov, 2017  Crushing injury of right foot, initial encounter S97.81XA

 

 [facility]  [id number] [place] [address] 07670-
3078  27 Sep, 2017   

 

 [facility]  [address] [address]-
7774  21 Sep, 2017   

 

 [facility]  [id number] [address]
-7878  19 Sep, 2017  Acute non-recurrent pansinusitis J01.40

 

 [facility]  [address] [address]-
9603  19 Sep, 2017   

 

 [facility]  [address] [address]-
5339  04 Aug, 2017   

 

 [facility]  [address] [address]-
6869     

 

 [facility]  [id number] [place] [address] 07229-
1421  26 May, 2017   

 

 [facility]  [address] [address]-
5603  08 May, 2017  Diabetes E11.9 ; Anxiety disorder, unspecified F41.9 and 
Acquired hypothyroidism E03.9

 

 [facility]  [address] [address]-
0003  01 May, 2017   

 

 [facility]  [id number] [address]-
5746     

 

 [facility]  [address] [address]-
2666     

 

 [facility]  [address] [address]-
0227  06 Mar, 2017   

 

 [facility]  [id number] [address]-
6911     

 

 [facility]  [address] [address]-
1297     

 

 [facility]  [address] [address]-
0133     

 

 [facility]  [address] [address]-
2281     

 

 [facility]  [address] [place] [address] 21484-
4783    Acute non-recurrent maxillary sinusitis J01.00

 

 [facility]  [id number] [place] [address] 24254-
4550     

 

 [facility]  [id number] [place] [address] 61250-
1767     

 

 [facility]  [id number] [place] [address] 37754-
8754  12 Dec, 2016   

 

 [facility]  [id number] [place] [address] 59830-
7139  15 Nov, 2016   

 

 [facility]  [id number] [place] [address] 34450-
3010  12 Oct, 2016  Diabetes E11.9

 

 [facility]  [id number] [place] [address] 43598-
1557  28 Sep, 2016  Pelvic pain R10.2 ; Leukocytosis, unspecified D72.829 ; 
Constipation, unspecified constipation type K59.00 and History of pancreatitis 
Z87.19

 

 [facility]  [id number] [place] [address] 39963-
4041  22 Sep, 2016   

 

 [facility]  [id number] [place] [address] 40124-
0479  14 Sep, 2016  Diabetes E11.9

 

 [facility]  [id number] [place] [address] 49526-
4337  13 Sep, 2016   

 

 [facility]  [id number] [place] [address] 95941-
9456  09 Sep, 2016  Vaginal yeast infection B37.3

 

 [facility]  [id number] [place] [address] 25712-
3507  08 Sep, 2016   

 

 [facility]  [id number] [place] [address] 25872-
7541  30 Aug, 2016  Diabetes E11.9

 

 [facility]  [id number] [place] [address] 47597-
2578  25 Aug, 2016  Anxiety disorder, unspecified F41.9

 

 [facility]  [address] [address]-
3920  17 Aug, 2016  Vaginal yeast infection B37.3

 

 [facility]  [address] [address]-
4241  17 Aug, 2016   

 

 [facility]  [id number] [place] [address] 05858-
0870    Anxiety disorder, unspecified F41.9

 

 [facility]  [address] [address]-
0456    Vaginal yeast infection B37.3

 

 [facility]  [address] [address]-
6664    Anxiety disorder, unspecified F41.9

 

 [facility]  [address] [place] [address] 54649-
6679    Anxiety disorder, unspecified F41.9

 

 [facility]  [address] [address]-
6729  06 May, 2016  Anxiety disorder, unspecified F41.9

 

 [facility]  [address] [address]-
6400  04 May, 2016  Diabetes E11.9

 

 [facility]  [address] [address]-
2522    Anxiety disorder, unspecified F41.9

 

 [facility]  [address] [address]-
5579     

 

 [facility]  [id number] [address]-
1855  25 Mar, 2016  Vaginal yeast infection B37.3

 

 [facility]  [address] [address]-
6433  15 Mar, 2016   

 

 [facility]  [address] [place] [address] 53918-
6296     

 

 [facility]  [address] [place] [address] 11849-
0889     

 

 [facility]  [id number] [place] [address] 93424-
3396     

 

 [facility]  [id number] [place] [address] 07042-
3088  15 Andrei, 2016   

 

 [facility]  [id number] [place] [address] 84454-
6740  22 Dec, 2015   

 

 [facility]  [id number] [place] [address] 96127-
7552    Diabetes E11.9 ; Acquired hypothyroidism E03.9 ; 
Hyperlipidemia, unspecified hyperlipidemia E78.5 and Anxiety F41.9

 

 [facility]  [id number] [place] [address] 23376-
4338     

 

 [facility]  [id number] [place] [address] 68190-
4046  27 Oct, 2015   

 

 [facility]  [id number] [place] [address] 31021-
5039  29 Sep, 2015   

 

 [facility]  [id number] [place] [address] 17481-
7031  01 Sep, 2015   

 

 [facility]  [address] [place] [address] 92855-
0469  04 Aug, 2015   

 

 [facility]  [id number] [place] [address] 62265-
6991  15 Jul, 2015   

 

 [facility]  [id number] [place] [address] 27399-
1799    Pap test, as part of routine gynecological examination 
V76.2 and DUB (dysfunctional uterine bleeding) 626.8

 

 [facility]  [id number] [place] [address] 53290-
2313     

 

 [facility]  [id number] [place] [address] 97380-
3916     

 

 [facility]  [address] [id number] [place] [address]-
7725     

 

 [facility]  [address] [id number] [place] [address]-
4496     

 

 [facility]  [address] [facility] [id number] [place] [address]-
1546  15 Eagle, 2015  Diabetes 250.00

 

 [facility]  [address] [id number] [place] [address]-
9736     

 

 [facility]  [address] [id number] [place] [address]-
2146  19 May, 2015   

 

 [facility]  [address] [id number] [place] [address]-
4869  13 May, 2015  Diabetes 250.00

 

 [facility]  [address] [id number] [place] [address]-
3196  12 May, 2015   

 

 [facility]  [address] [id number] [place] [address]-
0311  07 May, 2015   

 

 [facility]  [address] [id number] [place] [address]-
5866  07 May, 2015   

 

 [facility]  [address] [facility] [id number] [place] [address]-
4146  05 May, 2015   

 

 [facility]  [address] [id number] [place] [address]-
2426  01 May, 2015   

 

 [facility]  [address] [id number] [address]-
7886     

 

 [facility]  [address] [id number] [place] [address]-
5416     

 

 [facility]  [address] [facility] [id number] [place] [address]-
2246     

 

 [facility]  [address] [id number] [address]-
0436  17 Mar, 2015   

 

 [facility] [place] [facility]  [address] [id number] [place] [address]-
9161  17 Mar,    

 

 [facility] [place] [facility]  [address] [id number] [place] [address]-
6766  17 Mar,    

 

 [facility] [place] [facility]  [address] [id number] [place] [address]-
7598  17 Mar,    

 

 [facility] [place] [facility]  [address] [id number] [place] [address]-
4073  09 Mar,    

 

 [facility] [place] [facility]  [address] [id number] [place] [address]-
8354  09 Mar,    

 

 [facility] [place] [facility]  [address] [id number] [place] [address]-
7976  06 Mar,    

 

 [facility] [place] [facility]  [address] [id number] [place] [address]-
7042  03 Mar, 2015   

 

 [facility] [place] [facility]  [address] [id number] [place] [address]-
7372  03 Mar, 2015   

 

 [facility] [place] [facility]  [address] [id number] [place] [address]-
9028  ,    

 

 [facility] [place] [facility]  [address] [id number] [place] [address]-
2844  ,    

 

 [facility] [place] [facility]  [address] [id number] [place] [address]-
5784  ,    

 

 [facility] [place] [facility]  [address] [id number] [place] [address]-
7889  ,    

 

 [facility] [place] [facility]  [address] [id number] [place] [address]-
6506  ,    

 

 [facility] [place] [facility]  [address] [id number] [place] [address]-
3324  ,    

 

 [facility] [place] [facility]  [address] [id number] [place] [address]-
5019  ,    

 

 [facility] [place] [facility]  [address] [id number] [place] [address]-
6316     

 

 [facility] [place] [facility]  [address] [id number] [place] [address]-
3366     

 

 [facility] [place] [facility]  [address] [id number] [place] [address]-
2947     

 

 [facility] [place] [facility]  [address] [id number] [place] [address]-
9522     

 

 [facility] [place] [facility]  [address] [id number] [place] [address]-
3618     

 

 [facility] [place] [facility]  [address] [id number] [place] [address]-
9500     

 

 [facility] [place] [facility]  [address] [id number] [place] [address]-
6526     

 

 [facility] [place] [facility]  [address] [id number] [place] [address]-
7190     

 

 [facility] [place] [facility]  [address] [id number] [place] [address]-
0395     

 

 [facility] [place] [facility]  [address] [id number] [place] [address]-
8799     

 

 [facility] [place] [facility]  [address] [id number] [place] [address]-
4561     

 

 [facility] [place] [facility]  [address] [id number] [place] [address]-
4687     

 

 [facility] [place] [facility]  [address] [id number] [place] [address]-
8149     

 

 [facility] [place] [facility]  [address] [id number] [place] [address]-
8789  23 Dec, 2014   

 

 [facility] [place] [facility]  [address] [id number] [place] [address]-
3696  23 Dec, 2014   

 

 [facility] [place] [facility]  [address] [id number] [place] [address]-
8546  22 Dec, 2014   

 

 [facility] [place] [facility]  [address] [id number] [place] [address]-
4154  22 Dec, 2014   

 

 [facility] [place] [facility]  [address] [id number] [place] [address]-
8907  17 Dec, 2014   

 

 [facility] [place] [facility]  [address] [id number] [place] [address]-
8221  17 Dec, 2014   

 

 [facility] [place] [facility]  [address] [id number] [place] [address]-
6850  15 Dec, 2014   

 

 [facility] [place] [facility]  [address] [id number] [place] [address]-
8196  15 Dec, 2014   

 

 [facility] [place] [facility]  [address] [id number] [place] [address]-
4895  12 Dec, 2014   

 

 [facility] [place] [facility]  [address] [id number] [place] [address]-
8916  08 Dec, 2014   

 

 [facility] [place] [facility]  [address] [id number] [place] [address]-
7234  08 Dec, 2014   

 

 [facility] [place] [facility]  [address] [id number] [place] [address]-
2479  08 Dec, 2014   

 

 [facility] [place] [facility]  [address] [id number] [place] [address]-
7497  08 Dec, 2014   

 

 [facility] [place] [facility]  [address] [id number] [place] [address]-
1136     

 

 [facility] [place] [facility]  [address] [id number] [place] [address]-
8258     

 

 [facility] [place] [facility]  [address] [id number] [place] [address]-
3924     

 

 [facility] [place] [facility]  [address] [id number] [place] [address]-
2454     

 

 [facility] [place] [facility]  [address] [id number] [place] [address]-
1398  10 2014   

 

 [facility] [place] [facility]  [address] [id number] [address]-
5284  10 Nov, 2014   

 

 [facility] [place] [facility]  [address] [id number] [place] [address]-
9074  07 Oct, 2014   

 

 [facility] [place] [facility]  [address] [id number] [place]
4391  07 Oct, 2014   

 

 [facility] [place] [facility]  [address] [id number] [place] [address]-
0596  01 Oct, 2014   

 

 [facility] [place] [facility]  [address] [id number] [place] [address]-
3567  01 Oct, 2014   

 

 [facility] [place] [facility]  [address] [id number] [place] [address]-
3972  24 Sep, 2014   

 

 [facility] [place] [facility]  [address] [id number] [place] [address]-
4260  24 Sep, 2014   

 

 [facility] [place] [facility]  [address] [id number] [place] [address]-
1224  23 Sep, 2014   

 

 [facility] [place] [facility]  [address] [id number] [place] [address]-
3311  23 Sep, 2014   

 

 [facility] [place] [facility]  [address] [id number] [place] [address]-
6563  22 Sep, 2014   

 

 [facility] [place] [facility]  [address] [id number] [place] [address]-
0212  22 Sep, 2014   

 

 [facility] [place] [facility]  [address] [id number] [place] [address]-
2478  19 Aug, 2014   

 

 [facility] [place] [facility]  [address] [id number] [address]-
7744  19 Aug, 2014   

 

 [facility] [place] [facility]  [address] [id number] [place] [address]-
7200     

 

 [facility] [place] [facility]  [address] [id number] [place] [address]-
7042     

 

 [facility] [place] [facility]  [address] [id number] [place] [address]-
8133  10 Eagle, 2014   

 

 [facility] [place] [facility]  [address] [id number] [place] [address]-
5836     

 

 [facility]  [address] [id number] [place] [address]-
9041     

 

 [facility]  [address] [id number] [place] [address]-
4110  07 May, 2014   

 

 [facility]  [address] [id number] [place] [address]-
0715  07 May, 2014   

 

 [facility]  [address] [id number] [place] [address]-
0171     

 

 [facility]  [address] [id number] [place] [address]-
1931     

 

 [facility]  [address] [id number] [place] [address]-
0547     

 

 [facility]  [address] [id number] [place] [address]-
4667     

 

 [facility]  [address] [id number] [place] [address]-
4959     

 

 [facility]  [address] [id number] [place] [address]-
0282     

 

 [facility]  [address] [id number] [place] [address]-
3253     

 

 [facility] [place] [facility]  [address] [id number] [place] [address]-
9369     

 

 [facility]  [address] [id number] [place] [address]-
9346     

 

 [facility] [place] [facility]  [address] [id number] [place] [address]-
6670     

 

 [facility] [place] [facility]  [address] [id number] [place] [address]-
3345  17 Mar, 2014   

 

 [facility] [place] [facility]  [address] [id number] [place] [address]-
3856  17 Mar, 2014   

 

 [facility]  [address] [id number] [place] [address]-
9063     

 

 [facility] [place] [facility]  [address] [id number] [place] [address]-
1976     

 

 [facility] [place] [facility]  [address] [id number] [place] [address]-
0324     

 

 [facility] [place] [facility]  [address] [id number] [place] [address]-
8716     

 

 [facility] [place] [facility]  [address] [id number] [place] [address]-
8532  25 Oct, 2013   

 

 [facility] [place] [facility]  [address] [id number] [place] [address]-
6086  25 Oct, 2013   

 

 [facility] [place] [facility]  [address] [id number] [place] [address]-
1816  23 Sep, 2013   

 

 [facility] [place] [facility]  [address] [id number] [place] [address]-
5806  06 Aug, 2013   

 

 [facility] [place] [facility]  [address] [id number] [place] [address]-
8601  05 Aug, 2013   

 

 [facility] [place] [facility]  [address] [id number] [place] [address]-
7646     

 

 [facility] [place] [facility]  [address] [id number] [place] [address]-
6656     

 

 [facility] [place] [facility]  [address] [id number] [address]-
7456     

 

 [facility] [place] [facility]  [address] [id number] [place] [address]-
6012  28 Dec, 2012   

 

 [facility] [place] [facility]  [address] [id number] [place] [address]-
9496  19 Dec, 2012   

 

 [facility] [place] [facility]  [address] [id number] [place] [address]-
3746  19 Dec, 2012   

 

 [facility] [place] [facility]  [address] [id number] [place] [address]-
9902  13 Dec, 2012   

 

 [facility] [place] [facility]  [address] [id number] [place] [address]-
3190  13 Dec, 2012   

 

 [facility] [place] [facility]  [address] [id number] [place] [address]-
0338  27 2012   

 

 [facility] [place] [facility]  [address] [id number] [place] [address]-
0683  27 2012   

 

 [facility] [place] [facility]  [address] [id number] [place] [address]-
1386  19 Oct, 2012   

 

 [facility] [place] [facility]  [address] [id number] [place] [address]-
2338  18 Oct, 2012   

 

 [facility] [place] [facility]  [address] [id number] [place] [address]-
0512  17 Oct, 2012   

 

 [facility] [place] [facility]  [address] [id number] [place] [address]-
1972  17 Oct, 2012   

 

 [facility] [place] [facility]  [address] [id number] [place] [address]-
7542  16 Oct, 2012   

 

 [facility] [place] [facility]  [address] [id number] [place] [place] [address]-
1183  16 Oct, 2012   

 

 [facility] [place] [facility]  [address] [facility] [id number] [place] [address]-
6182  15 Oct, 2012   

 

 [facility] [place] [facility]  [address] [id number] [place] [address]-
5695  27 Sep, 2012   

 

 [facility] [place] [facility]  [address] [id number] [place] [address]-
7336  26 Sep, 2012   

 

 [facility] [place] [facility]  [address] [id number] [place] [address]-
0844     

 

 [facility] [place] [facility]  [address] [facility] [id number] [place] [address]-
2670     

 

 [facility] [place] [facility]  [address] [facility] [id number] [place] [address]-
1965     

 

 [facility] [place] [facility]  [address] [id number] [place] [address]-
4530     

 

 [facility] [place] [facility]  [address] [id number] [place] [address]-
7202     

 

 [facility] [place] [facility]  [address] [id number] [place] [address]-
4016     

 

 [facility] [place] [facility]  [address] [id number] [place] [address]-
7903     

 

 [facility] [place] [facility]  [address] [id number] [place] [address]-
8273     

 

 [facility] [place] [facility]  [address] [id number] [place] [address]-
0456  01 Mar, 2012   

 

 [facility] [place] [facility]  [address] [id number] [place] [address]-
9354     

 

 [facility] [place] [facility]  [address] [id number] [place] [address]-
0911     

 

 [facility] [place] [facility]  [address] [id number] [place] [address]-
2286     

 

 [facility] [place] [facility]  [address] [id number] [place] [address]-
3879  10 Andrei, 2012   

 

 [facility] [place] [facility]  [address] [id number] [place] [address]-
0295     

 

 [facility] [place] [facility]  [address] [id number] [place] [address]-
3238  20 Dec, 2011   

 

 [facility] [place] [facility]  [address] [id number] [place] [address]-
7210  20 Dec, 2011   

 

 [facility] [place] [facility]  [address] [id number] [place] [address]-
4704  13 Dec, 2011   

 

 [facility] [place] [facility]  [address] [id number] [place] [address]-
0539  08 Dec, 2011   

 

 [facility] [place] [facility]  [address] [id number] [address]-
2507  08 Dec, 2011   

 

 [facility]  [address] [id number] [address]-
6034  06 Dec, 2011   

 

 [facility]  [id number] [address]-
3570  10 Oct, 2011   

 

 [facility]  [id number] [address]-
1237  10 Oct, 2011   

 

 [facility]  [id number] [place] [id number]-
4807     

 

 [facility]  [id number] [address]-
5293  20 Dec, 2010   

 

 [facility]  [id number] [place] [address]-
3860  20 Dec, 2010   

 

 [facility]  [id number] [place] [address]-
3690     

 

 [facility]  [id number] [place] [address]-
3976     

 

 [facility]  [id number] [place] [address]-
2955  15 Oct, 2009   

 

 [facility]  [id number] [address]-
1626  15 Oct, 2009   

 

 [facility]  [id number] [address]-
2355     







IMMUNIZATIONS

No Known Immunizations



SOCIAL HISTORY

Never Assessed



REASON FOR VISIT

ER f/u [facility] 18 Ketoacidosis, gallbladder removal, PT is concerned a/
b CBD oil



PLAN OF CARE







 Activity  Details









  









 Follow Up  next available Reason:DM







VITAL SIGNS







 Height  62 in  2018

 

 Weight  142 lbs  2018

 

 Temperature  98.2 degrees Fahrenheit  2018

 

 Heart Rate  90 bpm  2018

 

 Respiratory Rate  20   2018

 

 BMI  25.97 kg/m2  2018

 

 Blood pressure systolic  118 mmHg  2018

 

 Blood pressure diastolic  78 mmHg  2018







MEDICATIONS







 Medication  Instructions  Dosage  Frequency  Start Date  End Date  Duration  
Status

 

 Cinnamon 500 MG  Orally 2 times a day  2 capsules  12h           Active

 

 Potassium 99 MG  Orally Once a day  1 tablet  24h           Active

 

 Xanax 1 MG  Orally Once a day  1 tablet  24h          Active

 

 Glucocard Expression Monitor w/Device     as directed     08 May, 2017        
Active

 

 Amaryl 2 MG     TAKE TWO TABLETS BY MOUTH TWICE DAILY           90 days  Active

 

 MetFORMIN HCl  mg     TAKE TWO TABLETS BY MOUTH TWICE DAILY WITH MEALS.  
MUST BE TEVA BRAND           90 days  Active

 

 Ibuprofen 800 MG  Orally Once a day  1 tablet  24h           Active







RESULTS







 Name  Result  Date  Reference Range

 

 A1C (IN HOUSE)     2018   

 

 A1C IN HOUSE  9.4     4.3 - 5.6 %

 

 Previous A1c  10.2      

 

 Lot   [id number]      

 

 Exp date  2020      







PROCEDURES







 Procedure  Date Ordered  Result  Body Site

 

 GLYCATED HEMOGLOBIN TEST  2018      







INSTRUCTIONS





MEDICATIONS ADMINISTERED

No Known Medications



MEDICAL (GENERAL) HISTORY







 Type  Description  Date

 

 Medical History  hypothyroidism   

 

 Medical History  type II diabetes   

 

 Medical History  back pain   

 

 Medical History  hyperlipidemia   

 

 Medical History  anxiety   

 

 Medical History  mood disorder   

 

 Medical History  heart murmur   

 

 Medical History  chronic bronchitis   

 

 Medical History  [place]   

 

 Surgical History  appendectomy   

 

 Surgical History  gallbladder removal   

 

 Hospitalization History  childbirth   

 

 Hospitalization History  pancreatitis  2005

 

 Hospitalization History  appendectomy   

 

 Hospitalization History  Anaphylaxis to Bactrim

## 2019-01-30 NOTE — XMS REPORT
[facility]

 Created on: 2018



Galvez Libra

External Reference #: [id number]

: 1965

Sex: Female



Demographics







 Address  [address] [place] [address]  [address]

 

 Preferred Language  Unknown

 

 Marital Status  Unknown

 

 Evangelical Affiliation  Unknown

 

 Race  Unknown

 

 Ethnic Group  Unknown





Author







 Author  ANIKET MAYFIELD

 

 [facility]

 

 Address  [address] [place]  [address]



 

 Phone  [phone]







Care Team Providers







 Care Team Member Name  Role  Phone

 

 ANIKET MAYFIELD  Unavailable  [phone]







PROBLEMS







 Type  Condition  ICD9-CM Code  DOQ33-UL Code  Onset Dates  Condition Status  
SNOMED Code

 

 Problem  Anxiety disorder, unspecified     F41.9     Active  542310726

 

 Problem  Violation of controlled substance agreement     Z91.14     Active  
502501123

 

 Problem  Intestinal malabsorption, unspecified     K90.9     Active  34713699

 

 Problem  Acquired hypothyroidism     E03.9     Active  292286127

 

 Problem  Diabetes     E11.9     Active  513688552

 

 Problem  Hypertriglyceridemia     E78.1     Active  230309154

 

 Problem  Status post cholecystectomy     Z90.49     Active  860688908







ALLERGIES

No Information



ENCOUNTERS







 Encounter  Location  Date  Diagnosis

 

 [facility]  [address] [address]-
9655  01 Oct, 2018   

 

 [facility]  [address] [address]-
8910  29 Aug, 2018  Yeast infection B37.9

 

 [place] [address] [address]-
7788  21 Aug, 2018   

 

 [facility]  [address] [address]-
7899    Intestinal malabsorption, unspecified K90.9 ; Diarrhea, 
unspecified R19.7 ; Diabetes E11.9 and Marijuana smoker F12.90

 

 [facility]  [address] 
[id number]    Dental examination Z01.20

 

 [facility]  [id number] [place] [address] 
[id number]  10 Jul, 2018  Dental examination Z01.20 and Dental caries K02.9

 

 [facility]  [address] [address]-
0638    Benzodiazepine use agreement exists Z02.89 ; Therapeutic 
drug monitoring Z51.81 and Violation of controlled substance agreement Z91.14

 

 [place] [address] [address]-
5119  05 2018   

 

 [place] [address] [address]-
1289    Ketoacidosis E87.2 ; Status post cholecystectomy Z90.49 ; 
Diabetes E11.9 ; Acquired hypothyroidism E03.9 ; Hypertriglyceridemia E78.1 and 
Vaginal yeast infection B37.3

 

 [place] [address] [address]-
0624  15 May, 2018   

 

 [place] [address] [address]-
6869     

 

 [place] [address] [address]-
5024  20 Mar, 2018   

 

 [facility]  [address] [address]-
3891     

 

 [place] [address] [address]-
6865     

 

 [place] [address] [address]-
3065  10 Andrei, 2018  Diabetes E11.9 and Drug-induced acute pancreatitis with 
uninfected necrosis K85.31

 

 [place] [address] [address]-
6591  27 Dec, 2017   

 

 [facility]  [address] [address]-
2848     

 

 [facility]  [address] [address]
-[address]35  10 Nov, 2017  Crushing injury of right foot, initial encounter S97.81XA

 

 [place] [address] [address]-
7817  27 Sep, 2017   

 

 [facility]  [address] [address]-
5803  21 Sep, 2017   

 

 [facility]  [address]65[address] [address]
-[address]89  19 Sep, 2017  Acute non-recurrent pansinusitis J01.40

 

 [facility]  [address]65100[place] [address]-
0813  19 Sep, 2017   

 

 [facility]  [id number] [place] [address]-
6553  04 Aug, 2017   

 

 [facility]  [id number] [place] [address]-
9543     

 

 [facility]  [address] [place] [address]-
1095  26 May, 2017   

 

 [facility]  [address] [place] [address] 28078-
6734  08 May, 2017  Diabetes E11.9 ; Anxiety disorder, unspecified F41.9 and 
Acquired hypothyroidism E03.9

 

 [facility]  [address]65100[place] [address]-
9693  01 May, 2017   

 

 [facility]  [address] [place] [address]-
4380     

 

 [facility]  [address]65100[place] [address]-
3582     

 

 [facility]  [id number] [address]-
8827  06 Mar, 2017   

 

 [facility]  [address]65[address] [address]-
7951     

 

 [facility]  [address] [place] [address]-
5532     

 

 [facility]  [address]65100[place] [address]-
0865     

 

 [facility]  [id number] [address]-
3780     

 

 [facility]  [address] [place] [address]-
4963    Acute non-recurrent maxillary sinusitis J01.00

 

 [facility]  [address] [place] [address] 34098-
3710     

 

 [facility]  [address] [place] [address]606-
2888     

 

 [facility]  [address] [place] [address] 11356-
8401  12 Dec, 2016   

 

 [facility]  [address] [place] [address]-
3356  15 Nov, 2016   

 

 [facility]  [address] [place] [address] 85671-
6327  12 Oct, 2016  Diabetes E11.9

 

 [facility]  [address] [place] [address] 226[address]-
0546  28 Sep, 2016  Pelvic pain R10.2 ; Leukocytosis, unspecified D72.829 ; 
Constipation, unspecified constipation type K59.00 and History of pancreatitis 
Z87.19

 

 [facility]  [address]65100[place] [address]-
6111  22 Sep, 2016   

 

 [facility]  [address] [place] [address]-
1033  14 Sep, 2016  Diabetes E11.9

 

 [facility]  [address]65[address] [address]-
7503  13 Sep, 2016   

 

 [facility]  [address] [place] [address] 32076-
2331  09 Sep, 2016  Vaginal yeast infection B37.3

 

 [facility]  [address] [place] [address] 28453-
0622  08 Sep, 2016   

 

 [facility]  [address] [place] [address] 62388-
5391  30 Aug, 2016  Diabetes E11.9

 

 [facility]  [address]65100[place] [address]-
9375  25 Aug, 2016  Anxiety disorder, unspecified F41.9

 

 [facility]  [address] [place] [address] 219619-
6528  17 Aug, 2016  Vaginal yeast infection B37.3

 

 [facility]  [address] [place] [address]-
1441  17 Aug, 2016   

 

 [facility]  [address] [place] [address]24-
1403    Anxiety disorder, unspecified F41.9

 

 [facility]  [address] [place] [address] 31715-
5931    Vaginal yeast infection B37.3

 

 [facility]  [address] [place] [address] 00363-
4116    Anxiety disorder, unspecified F41.9

 

 [facility]  [address] [place] [address] 09916-
7856    Anxiety disorder, unspecified F41.9

 

 [facility]  [address] [place] [address] 19839-
4223  06 May, 2016  Anxiety disorder, unspecified F41.9

 

 [facility]  [address]65100[place] [address]-
5446  04 May, 2016  Diabetes E11.9

 

 [facility]  [address] [place] [address] 06571-
6757    Anxiety disorder, unspecified F41.9

 

 [facility]  [address] [place] [address] 72308-
2794     

 

 [facility]  [address] [place] [address] 408332-
7697  25 Mar, 2016  Vaginal yeast infection B37.3

 

 [facility]  [address]65[address] [address]-
9538  15 Mar, 2016   

 

 [facility]  [address]65[address] [address]-
2458     

 

 [facility]  [address] [place] [address] 415591-
9916     

 

 [facility]  [address] [place] [address] 38964-
0476     

 

 [facility]  [address] [place] [address] 71871-
4933  15 Andrei, 2016   

 

 [facility]  [address] [place] [address] 401601-
9911  22 Dec, 2015   

 

 [facility]  [address] [place] [address] 758540-
7224    Diabetes E11.9 ; Acquired hypothyroidism E03.9 ; 
Hyperlipidemia, unspecified hyperlipidemia E78.5 and Anxiety F41.9

 

 [facility]  [address] [place] [address] 65902-
0841     

 

 [facility]  [address] [place] [address] 76624-
2151  27 Oct, 2015   

 

 [facility]  [address] [place] [address] 88184201-
1347  29 Sep, 2015   

 

 [facility]  [address] [place] [address] 85369-
1021  01 Sep, 2015   

 

 [facility]  [address] [place] [address] 06184-
7433  04 Aug, 2015   

 

 [facility]  [address] [place] [address] 59166-
3841  15 Jul, 2015   

 

 [facility]  [address] [place] [address] 96142-
7430    DUB (dysfunctional uterine bleeding) 626.8 and Pap test, as 
part of routine gynecological examination V76.2

 

 [facility]  [address] [place] [address] 08420-
9366     

 

 [facility]  [address] [id number] [place] [address]-
6046     

 

 [facility]  [address] [id number] [place] [address]-
5575     

 

 [facility]  [address] [id number] [place] [address]-
8192     

 

 [facility]  [address] [id number] [place] [address]-
1015  15 Eagle, 2015  Diabetes 250.00

 

 [facility]  [address] [id number] [place] [address]-
1344     

 

 [facility]  [address] [id number] [place] [address]-
3406  19 May, 2015   

 

 [facility]  [address] [id number] [place] [address]-
0908  13 May, 2015  Diabetes 250.00

 

 [facility]  [address] [id number] [place] [address]-
6161  12 May, 2015   

 

 [facility]  [address] [id number] [place] [address]-
3084  07 May, 2015   

 

 [facility]  [address] [id number] [place] [address]-
6523  07 May, 2015   

 

 [facility]  [address] [id number] [place] [address]-
6626  05 May, 2015   

 

 [facility]  [address] [id number] [address]-
6876  01 May, 2015   

 

 [facility]  [address] [id number] [place] [address]-
6534     

 

 [facility]  [address] [id number] [place] [address]-
7440     

 

 [facility]  [address] [id number] [place] [address]-
6298     

 

 [facility]  [address] [id number] [place] [address]-
1777  17 Mar,    

 

 [facility] [place] [facility]  [address] [id number] [place] [address]-
2147  17 Mar,    

 

 [facility] [place] [facility]  [address] [id number] [place] [address]-
8146  17 Mar,    

 

 [facility] [place] [facility]  [address] [id number] [place] [address]-
4977  17 Mar,    

 

 [facility] [place] [facility]  [address] [id number] [place] [address]-
8339  09 Mar,    

 

 [facility] [place] [facility]  [address] [id number] [place] [address]-
3203  09 Mar,    

 

 [facility] [place] [facility]  [address] [id number] [place] [address]-
8436  06 Mar,    

 

 [facility] [place] [facility]  [address] [id number] [place] [address]-
9155  03 Mar,    

 

 [facility] [place] [facility]  [address] [id number] [place] [address]-
8688  03 Mar,    

 

 [facility] [place] [facility]  [address] [id number] [place] [address]-
1418  ,    

 

 [facility] [place] [facility]  [address] [facility] [id number] [place] [address]-
3503  ,    

 

 [facility] [place] [facility]  [address] [id number] [place] [address]-
9306  ,    

 

 [facility] [place] [facility]  [address] [facility] [id number] [place] [address]-
2546  ,    

 

 [facility] [place] [facility]  [address] [id number] [place] [address]-
4986  ,    

 

 [facility] [place] [facility]  [address] [facility] [id number] [place] [address]-
2546  ,    

 

 [facility] [place] [facility]  [address] [facility] [id number] [place] [address]-
2546     

 

 [facility] [place] [facility]  [address] [id number] [place] [address]-
6502     

 

 [facility] [place] [facility]  [address] [id number] [place] [address]-
3086     

 

 [facility] [place] [facility]  [address] [id number] [place] [address]-
7120     

 

 [facility] [place] [facility]  [address] [id number] [place] [address]-
3611     

 

 [facility] [place] [facility]  [address] [id number] [place] [address]-
0556     

 

 [facility] [place] [facility]  [address] [id number] [place] [address]-
8824     

 

 [facility] [place] [facility]  [address] [id number] [place] [address]-
1754     

 

 [facility] [place] [facility]  [address] [id number] [place] [address]-
8404     

 

 [facility] [place] [facility]  [address] [id number] [place] [address]-
0191     

 

 [facility] [place] [facility]  [address] [id number] [place] [address]-
5443     

 

 [facility] [place] [facility]  [address] [id number] [place] [address]-
1921     

 

 [facility] [place] [facility]  [address] [id number] [place] [address]-
0708     

 

 [facility] [place] [facility]  [address] [id number] [place] [address]-
2759     

 

 [facility] [place] [facility]  [address] [id number] [place] [address]-
9156  23 Dec, 2014   

 

 [facility] [place] [facility]  [address] [id number] [place] [address]-
8983  23 Dec, 2014   

 

 [facility] [place] [facility]  [address] [id number] [place] [address]-
7353  22 Dec, 2014   

 

 [facility] [place] [facility]  [address] [id number] [place] [address]-
4523  22 Dec, 2014   

 

 [facility] [place] [facility]  [address] [id number] [place]-
4976  17 Dec, 2014   

 

 [facility] [place] [facility]  [address] [id number] [place]
4846  17 Dec, 2014   

 

 [facility] [place] [facility]  [address] [id number] [place] [address]-
8862  15 Dec, 2014   

 

 [facility] [place] [facility]  [address] [id number] [place] [address]-
9867  15 Dec, 2014   

 

 [facility] [place] [facility]  [address] [id number] [place] [address]-
9688  12 Dec, 2014   

 

 [facility] [place] [facility]  [address] [id number] [place] [address]-
0796  08 Dec, 2014   

 

 [facility] [place] [facility]  [address] [id number] [place] [address]-
9231  08 Dec, 2014   

 

 [facility] [place] [facility]  [address] [id number] [place] [address]-
6785  08 Dec, 2014   

 

 [facility] [place] [facility]  [address] [facility] [id number] [place] [address]-
2152  08 Dec, 2014   

 

 [facility] [place] [facility]  [address] [id number] [place] [address]-
7617     

 

 [facility] [place] [facility]  [address] [id number] [place] [address]-
7359     

 

 [facility] [place] [facility]  [address] [id number] [place] [address]-
1620     

 

 [facility] [place] [facility]  [address] [id number] [place] [address]-
9342  13 2014   

 

 [facility] [place] [facility]  [address] [id number] [place]
4238  10 Nov, 2014   

 

 [facility] [place] [facility]  [address] [id number] [place] [address]-
7286  10 Nov, 2014   

 

 [facility] [place] [facility]  [address] [id number] [place] [address]-
6658  07 Oct, 2014   

 

 [facility] [place] [facility]  [address] [id number] [place]-
0628  07 Oct, 2014   

 

 [facility] [place] [facility]  [address] [id number] [place] [address]-
3281  01 Oct, 2014   

 

 [facility] [place] [facility]  [address] [id number] [place] [address]-
7780  01 Oct, 2014   

 

 [facility] [place] [facility]  [address] [id number] [place] [address]-
0703  24 Sep, 2014   

 

 [facility] [place] [facility]  [address] [id number] [place] [address]-
6301  24 Sep, 2014   

 

 [facility] [place] [facility]  [address] [id number] [place] [address]-
3002  23 Sep, 2014   

 

 [facility] [place] [facility]  [address] [id number] [place] [address]-
1553  23 Sep, 2014   

 

 [facility] [place] [facility]  [address] [id number] [place] [address]-
8158  22 Sep, 2014   

 

 [facility] [place] [facility]  [address] [id number] [place] [address]-
8856  22 Sep, 2014   

 

 [facility] [place] [facility]  [address] [id number] [place] [address]-
5583  19 Aug, 2014   

 

 [facility] [place] [facility]  [address] [id number] [place] [address]-
7847  19 Aug, 2014   

 

 [facility] [place] [facility]  [address] [id number] [place] [address]-
4182     

 

 [facility] [place] [facility]  [address] [id number] [place] [address]-
9371     

 

 [facility] [place] [facility]  [address] [id number] [place] [address]-
3270  10 Eagle, 2014   

 

 [facility] [place] [facility]  [address] [id number] [place] [address]-
2092     

 

 [facility] [place] [facility]  [address] [id number] [place]
5567     

 

 [facility] [place] [facility]  [address] [id number] [place] [address]-
4414  07 May, 2014   

 

 [facility] [place] [facility]  [address] [id number] [place] [address]-
4417  07 May, 2014   

 

 [facility] [place] [facility]  [address] [id number] [place] [address]-
5908     

 

 [facility] [place] [facility]  [address] [id number] [place] [address]-
1118     

 

 [facility] [place] [facility]  [address] [id number] [place] [address]-
0158     

 

 [facility] [place] [facility]  [address] [id number] [place] [address]-
1810     

 

 [facility] [place] [facility]  [address] [id number] [place] [address]-
7083     

 

 [facility] [place] [facility]  [address] [id number] [place] [address]-
7982     

 

 [facility] [place] [facility]  [address] [id number] [place] [address]-
3051     

 

 [facility] [place] [facility]  [address] [id number] [place] [address]-
6397     

 

 [facility] [place] [facility]  [address] [id number] [place] [address]-
3929     

 

 [facility] [place] [facility]  [address] [id number] [place] [address]-
1652     

 

 [facility] [place] [facility]  [address] [id number] [place] [address]-
7383  17 Mar, 2014   

 

 [facility] [place] [facility]  [address] [id number] [place] [address]-
2546  17 Mar, 2014   

 

 [facility]  [address] [id number] [place] [address]-
2516     

 

 [facility] [place] [facility]  [address] [id number] [place] [address]-
2546     

 

 [facility]  [address] [id number] [place] [address]-
2546     

 

 [facility] [place] [facility]  [address] [id number] [place] [address]-
2546     

 

 [facility] [place] [facility]  [address] [id number] [place] [address]-
9336  25 Oct, 2013   

 

 [facility] [place] [facility]  [address] [id number] [place] [address]-
5406  25 Oct, 2013   

 

 [facility] [place] [facility]  [address] [id number] [place] [address]-
2546  23 Sep, 2013   

 

 [facility]  [address] [id number] [place] [address]-
2546  06 Aug, 2013   

 

 [facility] [place] [facility]  [address] [id number] [place] [address]-
2546  05 Aug, 2013   

 

 [facility]  [address] [id number] [place] [address]-
2546     

 

 [facility] [place] [facility]  [address] [id number] [place] [address]-
2546     

 

 [facility] [place] [facility]  [address] [id number] [place] [address]-
2546     

 

 [facility] [place] [facility]  [address] [id number] [place] [address]-
2546  28 Dec, 2012   

 

 [facility] [place] [facility]  [address] [id number] [place] [address]-
2546  19 Dec, 2012   

 

 [facility] [place] [facility]  [address] [id number] [place] [address]-
7236  19 Dec, 2012   

 

 [facility] [place] [facility]  [address] [id number] [place] [address]-
2678  13 Dec, 2012   

 

 [facility] [place] [facility]  [address] [id number] [place] [address]-
5766  13 Dec, 2012   

 

 [facility] [place] [facility]  [address] [id number] [place] [address]-
8781     

 

 [facility] [place] [facility]  [address] [id number] [place] [address]-
[address]     

 

 [facility] [place] [facility]  [address] [id number] [place] [address]-
2833  19 Oct, 2012   

 

 [facility] [place] [facility]  [address] [id number] [place] [address]-
6587  18 Oct, 2012   

 

 [facility] [place] [facility]  [address] [id number] [place] [address]-
9777  17 Oct, 2012   

 

 [facility] [place] [facility]  [address] [id number] [place] [address]-
0333  17 Oct, 2012   

 

 [facility] [place] [facility]  [address] [id number] [place] [address]-
9369  16 Oct, 2012   

 

 [facility] [place] [facility]  [address] [facility] [id number] [place] [address]-
7605  16 Oct, 2012   

 

 [facility] [place] [facility]  [address] [id number] [place] [address]-
9353  15 Oct, 2012   

 

 [facility] [place] [facility]  [address] [id number] [place] [address]-
6958  27 Sep, 2012   

 

 [facility] [place] [facility]  [address] [id number] [place] [address]
4238  26 Sep, 2012   

 

 [facility] [place] [facility]  [address] [id number] [place] [address]-
0146     

 

 [facility] [place] [facility]  [address] [facility] [id number] [place] [address]-
0426     

 

 [facility] [place] [facility]  [address] [id number] [place] [address]-
6074     

 

 [facility] [place] [facility]  [address] [id number] [place] [address]-
2856     

 

 [facility] [place] [facility]  [address] [id number] [place] [address]-
5126     

 

 [facility] [place] [facility]  [address] [id number] [place] [address]-
[address]     

 

 [facility] [place] [facility]  [address] [id number] [place] [address]-
6766     

 

 [facility] [place] [facility]  [address] [id number] [place] [address]-
2779     

 

 [facility] [place] [facility]  [address] [id number] [place] [address]
2676  01 Mar, 2012   

 

 [facility] [place] [facility]  [address] [id number] [place] [address]-
4462     

 

 [facility] [place] [facility]  [address] [id number] [place] [address]-
6278     

 

 [facility] [place] [facility]  [address] [id number] [place] [address]-
7391     

 

 [facility] [place] [facility]  [address] [id number] [place] [address]-
8293  10 Andrei, 2012   

 

 [facility] [place] [facility]  [address] [id number] [place] [address]-
1164     

 

 [facility] [place] [facility]  [address] [id number] [place] [address]-
3272  20 Dec, 2011   

 

 [facility] [place] [facility]  [address] [id number] [place] [address]-
5396  20 Dec, 2011   

 

 [facility] [place] [facility]  [address] [id number] [place] [address]-
5486  13 Dec, 2011   

 

 [facility] [place] [facility]  [address] [id number] [place] [address]-
5073  08 Dec, 2011   

 

 [facility]  [address] [facility] [id number] [address]
6081  08 Dec, 2011   

 

 [facility]  [id number] [address]
2304  06 Dec, 2011   

 

 [facility]  [address] [facility] [id number] [place] [address]
5153  10 Oct, 2011   

 

 [facility]  [address] [place] [address]
3220  10 Oct, 2011   

 

 [facility]  [address] [facility] [address] [id number] [place] [address]-
0085     

 

 [facility]  [address] [place] [address]-
8521  20 Dec, 2010   

 

 [facility]  [id number] [address]-
1498  20 Dec, 2010   

 

 [facility]  [id number] [address]-
6675     

 

 [facility]  [id number] [address]
7093     

 

 [facility]  [id number] [address]-
2848  15 Oct, 2009   

 

 [facility]  [id number] [address]
6455  15 Oct, 2009   

 

 [facility]  [id number] [address]-
9195     







IMMUNIZATIONS

No Known Immunizations



SOCIAL HISTORY

Never Assessed



REASON FOR VISIT

medication



PLAN OF CARE





VITAL SIGNS





MEDICATIONS

Unknown Medications



RESULTS

No Results



PROCEDURES

No Known procedures



INSTRUCTIONS





MEDICATIONS ADMINISTERED

No Known Medications



MEDICAL (GENERAL) HISTORY







 Type  Description  Date

 

 Medical History  hypothyroidism   

 

 Medical History  type II diabetes   

 

 Medical History  back pain   

 

 Medical History  hyperlipidemia   

 

 Medical History  anxiety   

 

 Medical History  mood disorder   

 

 Medical History  heart murmur   

 

 Medical History  chronic bronchitis   

 

 Medical History  [place]   

 

 Surgical History  appendectomy   

 

 Surgical History  gallbladder removal   

 

 Hospitalization History  childbirth   

 

 Hospitalization History  pancreatitis  2005

 

 Hospitalization History  appendectomy   

 

 Hospitalization History  Anaphylaxis to Bactrim  2016

## 2019-01-30 NOTE — XMS REPORT
[facility]

 Created on: 2017



Libra Galvez

External Reference #: [id number]

: 1965

Sex: Female



Demographics







 Address  [address]  [address]

 

 Preferred Language  Unknown

 

 Marital Status  Unknown

 

 Taoism Affiliation  Unknown

 

 Race  Unknown

 

 Ethnic Group  Unknown





Author







 Author  ANIKET MAYFIELD

 

 [facility]

 

 Address  [address] [place]  [address]



 

 Phone  [phone]







Care Team Providers







 Care Team Member Name  Role  Phone

 

 ANIKET MAYFIELD  Unavailable  [phone]







PROBLEMS







 Type  Condition  ICD9-CM Code  VYR07-MV Code  Onset Dates  Condition Status  
SNOMED Code

 

 Problem  Anxiety disorder, unspecified     F41.9     Active  905660838

 

 Assessment  Vaginal yeast infection     B37.3  09 Sep, 2016  Active  23872933







ALLERGIES

Unknown Allergies



SOCIAL HISTORY

No smoking Hx information available



PLAN OF CARE





VITAL SIGNS





MEDICATIONS







 Medication  Instructions  Dosage  Frequency  Start Date  End Date  Duration  
Status

 

 Diflucan 150 MG  Orally one time.  May repeat in 3 days if symptoms persist.  
1 tablet           4 days  Active







RESULTS

No Results



PROCEDURES

No Known procedures



IMMUNIZATIONS

No Known Immunizations

## 2019-01-30 NOTE — XMS REPORT
[facility]

 Created on: 2017



Libra Galvez

External Reference #: [id number]

: 1965

Sex: Female



Demographics







 Address  [address]  [address]

 

 Preferred Language  Unknown

 

 Marital Status  Unknown

 

 Rastafari Affiliation  Unknown

 

 Race  Unknown

 

 Ethnic Group  Unknown





Author







 Author  ANIKET MAYFIELD

 

 [facility]

 

 Address  [address] [place]  [address]



 

 Phone  [phone]







Care Team Providers







 Care Team Member Name  Role  Phone

 

 ANIKET MAYFIELD  Unavailable  [phone]







PROBLEMS







 Type  Condition  ICD9-CM Code  EIK31-QC Code  Onset Dates  Condition Status  
SNOMED Code

 

 Problem  Acquired hypothyroidism     E03.9     Active  695022832

 

 Problem  Diabetes     E11.9     Active  185273034

 

 Problem  Anxiety disorder, unspecified     F41.9     Active  847348281







ALLERGIES

Unknown Allergies



SOCIAL HISTORY

No smoking Hx information available



PLAN OF CARE





VITAL SIGNS





MEDICATIONS







 Medication  Instructions  Dosage  Frequency  Start Date  End Date  Duration  
Status

 

 Xanax 1 MG  Orally Once a day  1 tablet  24h       28 days  Active







RESULTS

No Results



PROCEDURES

No Known procedures



IMMUNIZATIONS

No Known Immunizations

## 2019-01-30 NOTE — XMS REPORT
[facility]

 Created on: 2017



Libra Galvez

External Reference #: [id number]

: 1965

Sex: Female



Demographics







 Address  [address]  [address]

 

 Preferred Language  Unknown

 

 Marital Status  Unknown

 

 Faith Affiliation  Unknown

 

 Race  Unknown

 

 Ethnic Group  Unknown





Author







 Author  ANIKET MAYFIELD

 

 Organization  [facility]

 

 Address  [address] [place]  [address]



 

 Phone  [phone]







Care Team Providers







 Care Team Member Name  Role  Phone

 

 ANIKET MAYFIELD  Unavailable  [phone]







PROBLEMS







 Type  Condition  ICD9-CM Code  TRI22-DH Code  Onset Dates  Condition Status  
SNOMED Code

 

 Problem  Anxiety disorder, unspecified     F41.9     Active  752287339

 

 Assessment  Diabetes     E11.9  30 Aug, 2016  Active  457219028







ALLERGIES







 Substance  Reaction  Event Type  Date  Status

 

 N.K.D.A.  Unknown  Non Drug Allergy  30 Aug, 2016  Unknown







SOCIAL HISTORY

No smoking Hx information available



PLAN OF CARE





VITAL SIGNS







 Height  62 in  2016

 

 Weight  147.7 lbs  2016

 

 Heart Rate  84 bpm  2016

 

 Respiratory Rate  18   2016

 

 BMI  27.01 kg/m2  2016

 

 Blood pressure systolic  133 mmHg  2016

 

 Blood pressure diastolic  84 mmHg  2016







MEDICATIONS







 Medication  Instructions  Dosage  Frequency  Start Date  End Date  Duration  
Status

 

 Potassium 99 MG  Orally Once a day  1 tablet  24h           Active

 

 MetFORMIN HCl  MG  Orally 2 times a day  2 tablet with morning and 
evening meal  12h           Active

 

 Amaryl 4 MG  Orally 2 times a day  TAKE ONE TABLET BY MOUTH TWICE DAILY IN THE 
MORNING AND THE EVENING  12h           Active

 

 Xanax 1 MG  Orally Once a day  1 tablet  24h       28 days  Active







RESULTS







 Name  Result  Date  Reference Range

 

 A1C (IN HOUSE)     2016   

 

 A1C IN HOUSE  10.0     4.3 - 5.6 %

 

 Previous A1c  9.0      

 

 Lot   [id number]      

 

 Exp date        







PROCEDURES







 Procedure  Date Ordered  Related Diagnosis  Body Site

 

 GLYCATED HEMOGLOBIN TEST  Aug 30, 2016      

 

 Office Visit, Est Pt., Level 3  Aug 30, 2016      







IMMUNIZATIONS

No Known Immunizations

## 2019-01-30 NOTE — XMS REPORT
[facility]

 Created on: 2016



Libra Galvez

External Reference #: [id number]

: 1965

Sex: Female



Demographics







 Address  [address]  [address]

 

 Home Phone  [phone]

 

 Preferred Language  Unknown

 

 Marital Status  Unknown

 

 Pentecostal Affiliation  Unknown

 

 Race  White

 

 Ethnic Group  Not  or 





Author







 Author  ANIKET MAYFIELD

 

 Organization  [employer]

 

 Address  Unknown

 

 Phone  Unavailable







Care Team Providers







 Care Team Member Name  Role  Phone

 

 ANIKET MAYFIELD  CP  Unavailable



                                                                



Allergies

          No Known Allergies                                                   
                                     



Problems

          





 Problem Type  Condition  Code  Onset Dates  Condition Status

 

 Problem  Anxiety disorder, unspecified  F41.9     Active

 

 Problem  Diabetes  E11.9     Active



                                                                               
                   



Medications

          





 Medication  Code System  Code  Instructions  Start Date  End Date  Status  
Dosage

 

 Xanax  NDC  [address]-57  1 MG Orally Once a day  2016        1 
tablet



                                                                              



Results

          No Known Results                                                     
               



Summary Purpose

          [employer] Submission

## 2019-01-30 NOTE — XMS REPORT
[facility]

 Created on: 2017



Libra Galvez

External Reference #: [id number]

: 1965

Sex: Female



Demographics







 Address  [address]  [address]

 

 Preferred Language  Unknown

 

 Marital Status  Unknown

 

 Voodoo Affiliation  Unknown

 

 Race  Unknown

 

 Ethnic Group  Unknown





Author







 Author  ANIKET MAYFIELD

 

 Organization  [facility]

 

 Address  [address] [place]  [address]



 

 Phone  [phone]







Care Team Providers







 Care Team Member Name  Role  Phone

 

 ANIKET MAYFIELD  Unavailable  [phone]







PROBLEMS







 Type  Condition  ICD9-CM Code  RHT63-KB Code  Onset Dates  Condition Status  
SNOMED Code

 

 Problem  Acquired hypothyroidism     E03.9     Active  706961430

 

 Problem  Diabetes     E11.9     Active  516364483

 

 Problem  Anxiety disorder, unspecified     F41.9     Active  123024741







ALLERGIES

No Information



SOCIAL HISTORY

Never Assessed



PLAN OF CARE





VITAL SIGNS





MEDICATIONS







 Medication  Instructions  Dosage  Frequency  Start Date  End Date  Duration  
Status

 

 Dae Contour Test -  KQH09-G81.9 2 times a day- 3 times weekly.  test blood 
sugar             Active







RESULTS

No Results



PROCEDURES

No Known procedures



IMMUNIZATIONS

No Known Immunizations



MEDICAL (GENERAL) HISTORY







 Type  Description  Date

 

 Medical History  hypothyroidism   

 

 Medical History  type II diabetes   

 

 Medical History  back pain   

 

 Medical History  hyperlipidemia   

 

 Medical History  anxiety   

 

 Medical History  mood disorder   

 

 Medical History  heart murmur   

 

 Medical History  chronic bronchitis   

 

 Medical History  [place]   

 

 Surgical History  appendectomy   

 

 Hospitalization History  childbirth   

 

 Hospitalization History  pancreatitis  2005

 

 Hospitalization History  appendectomy   

 

 Hospitalization History  Anaphylaxis to Bactrim  2016

## 2019-01-30 NOTE — XMS REPORT
[facility]

 Created on: 2017



Libra Galvez

External Reference #: [id number]

: 1965

Sex: Female



Demographics







 Address  [address]  [address]

 

 Preferred Language  Unknown

 

 Marital Status  Unknown

 

 Holiness Affiliation  Unknown

 

 Race  Unknown

 

 Ethnic Group  Unknown





Author







 Author  ANIKET MAYFIELD

 

 [facility]

 

 Address  [address] [place]  [address]



 

 Phone  [phone]







Care Team Providers







 Care Team Member Name  Role  Phone

 

 ANIKET MAYFIELD  Unavailable  [phone]







PROBLEMS







 Type  Condition  ICD9-CM Code  KXD84-HW Code  Onset Dates  Condition Status  
SNOMED Code

 

 Problem  Anxiety disorder, unspecified     F41.9     Active  192842126







ALLERGIES

Unknown Allergies



SOCIAL HISTORY

No smoking Hx information available



PLAN OF CARE





VITAL SIGNS





MEDICATIONS







 Medication  Instructions  Dosage  Frequency  Start Date  End Date  Duration  
Status

 

 Xanax 1 MG  Orally Once a day  1 tablet  24h       28 days  Active







RESULTS

No Results



PROCEDURES

No Known procedures



IMMUNIZATIONS

No Known Immunizations

## 2019-01-30 NOTE — XMS REPORT
[facility]

 Created on: 2016



Galvez Libra

External Reference #: [id number]

: 1965

Sex: Female



Demographics







 Address  [address]  [address]

 

 Home Phone  [phone]

 

 Preferred Language  Unknown

 

 Marital Status  Unknown

 

 Presybeterian Affiliation  Unknown

 

 Race  White

 

 Ethnic Group  Not  or 





Author







 Author  ANIKET MAYFIELD

 

 Organization  [employer]

 

 Address  Unknown

 

 Phone  Unavailable







Care Team Providers







 Care Team Member Name  Role  Phone

 

 ANIKET MAYFIELD  CP  Unavailable



                                                                



Allergies, Adverse Reactions, Alerts

          





 Substance  Reaction  Event Type

 

 Sulfamethoxazole-Trimethoprim  anaphylaxis  Drug Allergy



                                                                               
         



Problems

          





 Problem Type  Condition  Code  Onset Dates  Condition Status

 

 Problem  Anxiety disorder, unspecified  F41.9     Active

 

 Assessment  Diabetes  E11.9     Active

 

 Problem  Diabetes  E11.9     Active



                                                                               
                             



Medications

          





 Medication  Code System  Code  Instructions  Start Date  End Date  Status  
Dosage

 

 MetFORMIN HCl ER  NDC  17543089923  500 MG Orally 2 times a day           2 
tablet with morning and evening meal

 

 Xanax  NDC  [address]-13  1 MG Orally Once a day  2016        1 
tablet

 

 Cinnamon  NDC  [address]-03  500 MG Orally 2 times a day           2 capsules

 

 Amaryl  NDC  [address]-35  2 MG Orally 2 times a day           2 tablets with 
meals

 

 Potassium  NDC  [address]-06  99 MG Orally Once a day           1 tablet

 

 Ibuprofen  NDC  [address]-23  800 MG Orally Once a day           1 tablet



                                                                               
                                                           



Procedures

          





 Procedure  Coding System  Code  Date

 

 Office Visit, Est Pt., Level 3  CPT-4  17960  Oct 12, 2016



                                                                               
                   



Vital Signs

          





 Date/Time:  Oct 12, 2016

 

 Cardiac Monitoring Heart Rate  86 bpm

 

 Weight  141.3 lbs

 

 Height  62 in

 

 BMI  25.84 Index

 

 Blood Pressure Diastolic  60 mmHg

 

 Blood Pressure Systolic  90 mmHg



                                                                              



Results

          No Known Results                                                     
               



Summary Purpose

          [employer] Submission

## 2019-01-30 NOTE — XMS REPORT
[facility]

 Created on: 2016



GalvezGilson levya

External Reference #: [id number]

: 1965

Sex: Female



Demographics







 Address  [address]  [address]

 

 Home Phone  [phone]

 

 Preferred Language  Unknown

 

 Marital Status  Unknown

 

 Sabianist Affiliation  Unknown

 

 Race  White

 

 Ethnic Group  Not  or 





Author







 Author  ANIKET MAYFIELD

 

 [facility]  [employer]

 

 Address  Unknown

 

 Phone  Unavailable







Care Team Providers







 Care Team Member Name  Role  Phone

 

 ANIKET MAYFIELD  CP  Unavailable



                                                                



Allergies

          No Known Allergies                                                   
                                     



Problems

          





 Problem Type  Condition  Code  Onset Dates  Condition Status

 

 Assessment  Vaginal yeast infection  B37.3     Active

 

 Problem  Anxiety state, unspecified  300.00     Active

 

 Problem  Pure hyperglyceridemia  272.1     Active

 

 Problem  Diabetes  250.00     Active

 

 Problem  Dysuria  788.1     Active

 

 Problem  Anxiety disorder, unspecified  F41.9     Active

 

 Problem  Other chronic nonalcoholic liver disease  571.8     Active

 

 Problem  Unspecified episodic mood disorder  296.90     Active

 

 Problem  Other bursitis disorders  727.3     Active

 

 Problem  Lumbago  724.2     Active



                                                                               
                                                                               
                    



Medications

          





 Medication  Code System  Code  Instructions  Start Date  End Date  Status  
Dosage

 

 Diflucan  NDC  [address]-23  150 MG Orally one time.  May repeat in 3 days if 
symptoms persist.           1 tablet



                                                                              



Results

          No Known Results                                                     
               



Summary Purpose

          [employer] Submission

## 2019-01-30 NOTE — XMS REPORT
[facility]

 Created on: 2017



Libra Galvez

External Reference #: [id number]

: 1965

Sex: Female



Demographics







 Address  [address]  [address]

 

 Preferred Language  Unknown

 

 Marital Status  Unknown

 

 Taoism Affiliation  Unknown

 

 Race  Unknown

 

 Ethnic Group  Unknown





Author







 Author  ANIKET MAYFIELD

 

 [facility]

 

 Address  [address] [place]  [address]



 

 Phone  [phone]







Care Team Providers







 Care Team Member Name  Role  Phone

 

 ANIKET MAYFIELD  Unavailable  [phone]







PROBLEMS







 Type  Condition  ICD9-CM Code  ZSG74-LE Code  Onset Dates  Condition Status  
SNOMED Code

 

 Problem  Acquired hypothyroidism     E03.9     Active  995869679

 

 Problem  Diabetes     E11.9     Active  287264484

 

 Problem  Anxiety disorder, unspecified     F41.9     Active  025191806







ALLERGIES

Unknown Allergies



SOCIAL HISTORY

No smoking Hx information available



PLAN OF CARE





VITAL SIGNS





MEDICATIONS







 Medication  Instructions  Dosage  Frequency  Start Date  End Date  Duration  
Status

 

 Xanax 1 MG  Orally Once a day  1 tablet  24h       28 days  Active







RESULTS

No Results



PROCEDURES

No Known procedures



IMMUNIZATIONS

No Known Immunizations

## 2019-01-30 NOTE — XMS REPORT
[facility]

 Created on: 2018



Galvez Libra

External Reference #: [id number]

: 1965

Sex: Female



Demographics







 Address  [address]  [address]

 

 Preferred Language  Unknown

 

 Marital Status  Unknown

 

 Faith Affiliation  Unknown

 

 Race  Unknown

 

 Ethnic Group  Unknown





Author







 Author  ANIKET MAYFIELD

 

 [facility]

 

 Address  [address] [place]  [address]



 

 Phone  [phone]







Care Team Providers







 Care Team Member Name  Role  Phone

 

 ANIKET MAYFIELD  Unavailable  [phone]







PROBLEMS







 Type  Condition  ICD9-CM Code  COP67-BR Code  Onset Dates  Condition Status  
SNOMED Code

 

 Problem  Acquired hypothyroidism     E03.9     Active  050099662

 

 Problem  Diabetes     E11.9     Active  550259442

 

 Problem  Anxiety disorder, unspecified     F41.9     Active  [id number]







ALLERGIES

No Information



ENCOUNTERS







 Encounter  Location  Date  Diagnosis

 

 [place] [address] [address]-
5904  20 Mar, 2018   

 

 [facility]  [address] [address]-
9351     

 

 [facility]  [address] [address]-
4367     

 

 [facility]  [address] [address]-
0283  10 Andrei, 2018  Diabetes E11.9 and Drug-induced acute pancreatitis with 
uninfected necrosis K85.31

 

 [facility]  [id number] [place] [address]-
8662  27 Dec, 2017   

 

 [facility]  [address] [address]-
7001     

 

 [facility]  [address] [address]
-[phone]  10 Nov, 2017  Crushing injury of right foot, initial encounter S97.81XA

 

 [facility]  [id number] [place] [address]-
2470  27 Sep, 2017   

 

 [facility]  [address] [address]-
6323  21 Sep, 2017   

 

 [facility]  [id number] [address]
-[phone]  19 Sep, 2017  Acute non-recurrent pansinusitis J01.40

 

 [facility]  [id number] [address]-
6220  19 Sep, 2017   

 

 [facility]  [id number] [place] [address] 20238-
6391  04 Aug, 2017   

 

 [facility]  [id number] [place] [address]-
8870     

 

 [facility]  [id number] [place] [address] 37028-
3165  26 May, 2017   

 

 [facility]  [id number] [place] [address] 77641-
1126  08 May, 2017  Diabetes E11.9 ; Anxiety disorder, unspecified F41.9 and 
Acquired hypothyroidism E03.9

 

 [facility]  [id number] [address]-
0315  01 May, 2017   

 

 [facility]  [id number] [place] [address] 61435-
7613     

 

 [facility]  [id number] [address]-
2884     

 

 [facility]  [id number] [address]-
8601  06 Mar, 2017   

 

 [facility]  [id number] [address]-
3079     

 

 [facility]  [id number] [address]-
5043     

 

 [facility]  [id number] [address]-
5705     

 

 [facility]  [address] [address]-
5551     

 

 [facility]  [id number] [place] [address] 41285-
0545    Acute non-recurrent maxillary sinusitis J01.00

 

 [facility]  [id number] [place] [address] 06356-
5176     

 

 [facility]  [id number] [place] [address] 01776-
2345     

 

 [facility]  [id number] [place] [address] 74990-
0517  12 Dec, 2016   

 

 [facility]  [id number] [place] [address] 24325-
4166  15 Nov, 2016   

 

 [facility]  [id number] [place] [address] 40535-
1194  12 Oct, 2016  Diabetes E11.9

 

 [facility]  [id number] [place] [address] 63474-
7655  28 Sep, 2016  Pelvic pain R10.2 ; Leukocytosis, unspecified D72.829 ; 
Constipation, unspecified constipation type K59.00 and History of pancreatitis 
Z87.19

 

 [facility]  [id number] [place] [address] 13674-
6121  22 Sep, 2016   

 

 [facility]  [id number] [place] [address] 06312-
8575  14 Sep, 2016  Diabetes E11.9

 

 [facility]  [id number] [place] [address] 54951-
2529  13 Sep, 2016   

 

 [facility]  [id number] [place] [address] 65681-
3302  09 Sep, 2016  Vaginal yeast infection B37.3

 

 [facility]  [id number] [place] [address] 52012-
3813  08 Sep, 2016   

 

 [facility]  [id number] [place] [address] 84746-
6703  30 Aug, 2016  Diabetes E11.9

 

 [facility]  [id number] [place] [address] 12723-
6238  25 Aug, 2016  Anxiety disorder, unspecified F41.9

 

 [facility]  [address] [address]-
6605  17 Aug, 2016  Vaginal yeast infection B37.3

 

 [facility]  [id number] [address]-
2155  17 Aug, 2016   

 

 [facility]  [id number] [place] [address] 12419-
4253    Anxiety disorder, unspecified F41.9

 

 [facility]  [id number] [place] [address] 93007-
8329    Vaginal yeast infection B37.3

 

 [facility]  [address] [place] [address] 783992-
7836    Anxiety disorder, unspecified F41.9

 

 [facility]  [id number] [place] [address] 23182-
3781    Anxiety disorder, unspecified F41.9

 

 [facility]  [address] [place] [address] 46698-
5600  06 May, 2016  Anxiety disorder, unspecified F41.9

 

 [facility]  [address] [place] [address] 15863-
5370  04 May, 2016  Diabetes E11.9

 

 [facility]  [address] [address]-
7571    Anxiety disorder, unspecified F41.9

 

 [facility]  [address] [address]-
9524     

 

 [facility]  [address] [place] [address] 33210-
4197  25 Mar, 2016  Vaginal yeast infection B37.3

 

 [facility]  [id number] [address]-
8918  15 Mar, 2016   

 

 [facility]  [address] [place] [address] 21195-
3652     

 

 [facility]  [address] [address]-
5214     

 

 [facility]  [id number] [place] [address] 24843-
6797     

 

 [facility]  [id number] [place] [address] 194661-
0189  15 Andrei, 2016   

 

 [facility]  [id number] [place] [address] 78981-
8296  22 Dec, 2015   

 

 [facility]  [id number] [place] [address] 58438-
7610    Diabetes E11.9 ; Acquired hypothyroidism E03.9 ; 
Hyperlipidemia, unspecified hyperlipidemia E78.5 and Anxiety F41.9

 

 [facility]  [id number] [place] [address] 38926-
7381     

 

 [facility]  [id number] [place] [address] 95422-
9094  27 Oct, 2015   

 

 [facility]  [id number] [place] [address] 63531-
9115  29 Sep, 2015   

 

 [facility]  [id number] [place] [address] 52936-
9319  01 Sep, 2015   

 

 [facility]  [address] [address]-
7227  04 Aug, 2015   

 

 [facility]  [id number] [place] [address] 36370-
6662  15 Jul, 2015   

 

 [facility]  [address] [place] [address] [phone]-
6187    Pap test, as part of routine gynecological examination 
V76.2 and DUB (dysfunctional uterine bleeding) 626.8

 

 [facility]  [address] [address]-
6676     

 

 [facility]  [id number] [place] [address] 78556-
6825     

 

 [facility]  [address] [id number] [place] [address]-
0495     

 

 [facility]  [address] [id number] [place] [address]-
9814     

 

 [facility]  [address] [facility] [id number] [place] [address]-
0438  15 Eagle, 2015  Diabetes 250.00

 

 [facility]  [address] [id number] [place] [address]-
8680     

 

 [facility]  [address] [id number] [place] [address]-
3882  19 May, 2015   

 

 [facility]  [address] [id number] [place] [address]-
2895  13 May, 2015  Diabetes 250.00

 

 [facility]  [address] [id number] [place] [address]-
0441  12 May, 2015   

 

 [facility]  [address] [id number] [place] [address]-
4874  07 May, 2015   

 

 [facility]  [address] [id number] [place] [address]-
3159  07 May, 2015   

 

 [facility]  [address] [facility] [id number] [place] [address]-
8415  05 May, 2015   

 

 [facility]  [address] [id number] [place] [address]-
8546  01 May, 2015   

 

 [facility]  [address] [id number] [place] [address]-
7542     

 

 [facility]  [address] [id number] [place] [address]-
7800     

 

 [facility]  [address] [id number] [place] [address]-
3993     

 

 [facility]  [address] [id number] [place] [address]-
6225  17 Mar, 2015   

 

 [facility] [place] [facility]  [address] [id number] [place] [address]-
2936  17 Mar,    

 

 [facility] [place] [facility]  [address] [id number] [place] [address]-
0493  17 Mar,    

 

 [facility] [place] [facility]  [address] [id number] [place] [address]-
9626  17 Mar,    

 

 [facility] [place] [facility]  [address] [id number] [place] [address]-
0655  09 Mar,    

 

 [facility] [place] [facility]  [address] [id number] [place] [address]-
4412  09 Mar,    

 

 [facility] [place] [facility]  [address] [id number] [place] [address]-
0227  06 Mar,    

 

 [facility] [place] [facility]  [address] [facility] [id number] [place] [address]-
6802  03 Mar,    

 

 [facility] [place] [facility]  [address] [id number] [place] [address]-
0327  03 Mar,    

 

 [facility] [place] [facility]  [address] [id number] [place] [address]-
3613  ,    

 

 [facility] [place] [facility]  [address] [facility] [id number] [place] [address]-
1025  ,    

 

 [facility] [place] [facility]  [address] [facility] [id number] [place] [address]-
4985  ,    

 

 [facility] [place] [facility]  [address] [facility] [id number] [address]-
3976  ,    

 

 [facility] [place] [facility]  [address] [facility] [id number] [place] [address]-
8843  ,    

 

 [facility] [place] [facility]  [address] [id number] [place] [address]-
0459  ,    

 

 [facility] [place] [facility]  [address] [facility] [id number] [place] [address]-
2542  ,    

 

 [facility] [place] [facility]  [address] [facility] [id number] [address]-
8913     

 

 [facility] [place] [facility]  [address] [id number] [place] [address]-
5201     

 

 [facility] [place] [facility]  [address] [id number] [place] [address]-
0918     

 

 [facility] [place] [facility]  [address] [id number] [place] [address]-
3960     

 

 [facility] [place] [facility]  [address] [id number] [place] [address]-
7255     

 

 [facility] [place] [facility]  [address] [id number] [place] [address]-
8465     

 

 [facility] [place] [facility]  [address] [id number] [place] [address]-
6725     

 

 [facility] [place] [facility]  [address] [id number] [place] [address]-
5176     

 

 [facility] [place] [facility]  [address] [id number] [place] [address]-
8122     

 

 [facility] [place] [facility]  [address] [id number] [place] [address]-
9825     

 

 [facility] [place] [facility]  [address] [id number] [place] [address]-
7760     

 

 [facility] [place] [facility]  [address] [id number] [place] [address]-
3596     

 

 [facility] [place] [facility]  [address] [id number] [place] [address]-
8269     

 

 [facility] [place] [facility]  [address] [id number] [place] [address]-
8289  23 Dec, 2014   

 

 [facility] [place] [facility]  [address] [id number] [place] [address]-
5022  23 Dec, 2014   

 

 [facility] [place] [facility]  [address] [id number] [place] [address]-
9570  22 Dec, 2014   

 

 [facility] [place] [facility]  [address] [id number] [place] [address]-
8840  22 Dec, 2014   

 

 [facility] [place] [facility]  [address] [id number] [place]
7016  17 Dec, 2014   

 

 [facility] [place] [facility]  [address] [id number] [place]
6656  17 Dec, 2014   

 

 [facility] [place] [facility]  [address] [id number] [place] [address]-
5516  15 Dec, 2014   

 

 [facility] [place] [facility]  [address] [id number] [place] [address]-
9307  15 Dec, 2014   

 

 [facility] [place] [facility]  [address] [id number] [place]
5162  12 Dec, 2014   

 

 [facility] [place] [facility]  [address] [id number] [place] [address]-
6677  08 Dec, 2014   

 

 [facility] [place] [facility]  [address] [id number] [place] [address]-
2231  08 Dec, 2014   

 

 [facility] [place] [facility]  [address] [id number] [place] [address]-
8863  08 Dec, 2014   

 

 [facility] [place] [facility]  [address] [id number] [place] [address]-
5053  08 Dec, 2014   

 

 [facility] [place] [facility]  [address] [id number] [place] [address]-
1624     

 

 [facility] [place] [facility]  [address] [id number] [place] [address]-
5124     

 

 [facility] [place] [facility]  [address] [id number] [place] [address]-
1796     

 

 [facility] [place] [facility]  [address] [id number] [place] [address]-
2288     

 

 [facility] [place] [facility]  [address] [id number] [place] [address]-
0352  10 Nov, 2014   

 

 [facility] [place] [facility]  [address] [id number] [place] [address]-
3915  10 Nov, 2014   

 

 [facility] [place] [facility]  [address] [id number] [place] [address]-
4395  07 Oct, 2014   

 

 [facility] [place] [facility]  [address] [id number] [place] [address]-
5507  07 Oct, 2014   

 

 [facility] [place] [facility]  [address] [id number] [place] [address]-
2693  01 Oct, 2014   

 

 [facility] [place] [facility]  [address] [id number] [place] [address]-
6038  01 Oct, 2014   

 

 [facility] [place] [facility]  [address] [id number] [place] [address]-
0498  24 Sep, 2014   

 

 [facility] [place] [facility]  [address] [id number] [place] [address]-
9563  24 Sep, 2014   

 

 [facility] [place] [facility]  [address] [id number] [place] [address]-
5959  23 Sep, 2014   

 

 [facility] [place] [facility]  [address] [id number] [place] [address]-
5277  23 Sep, 2014   

 

 [facility] [place] [facility]  [address] [id number] [place] [address]-
5976  22 Sep, 2014   

 

 [facility] [place] [facility]  [address] [id number] [place] [address]-
0758  22 Sep, 2014   

 

 [facility] [place] [facility]  [address] [id number] [place] [address]-
8772  19 Aug, 2014   

 

 [facility] [place] [facility]  [address] [id number] [address]-
7226  19 Aug, 2014   

 

 [facility] [place] [facility]  [address] [id number] [place] [address]-
0910     

 

 [facility] [place] [facility]  [address] [id number] [place] [address]-
2978     

 

 [facility] [place] [facility]  [address] [id number] [place] [address]-
6580  10 Eagle, 2014   

 

 [facility] [place] [facility]  [address] [id number] [place] [address]-
0917     

 

 [facility] [place] [facility]  [address] [id number] [place] [address]-
0859     

 

 [facility] [place] [facility]  [address] [id number] [place] [address]-
0270  07 May, 2014   

 

 [facility] [place] [facility]  [address] [id number] [place] [address]-
6628  07 May, 2014   

 

 [facility] [place] [facility]  [address] [id number] [place] [address]-
0578     

 

 [facility] [place] [facility]  [address] [id number] [place] [address]-
8013     

 

 [facility] [place] [facility]  [address] [id number] [place] [address]-
7094     

 

 [facility] [place] [facility]  [address] [id number] [place] [address]-
8980     

 

 [facility] [place] [facility]  [address] [id number] [place] [address]-
2590     

 

 [facility] [place] [facility]  [address] [id number] [place] [address]-
8421     

 

 [facility] [place] [facility]  [address] [id number] [place] [address]-
3730     

 

 [facility] [place] [facility]  [address] [id number] [place] [address]-
5744     

 

 [facility] [place] [facility]  [address] [id number] [place] [address]-
5683     

 

 [facility] [place] [facility]  [address] [id number] [place] [address]-
3042     

 

 [facility] [place] [facility]  [address] [id number] [place] [address]-
0388  17 Mar, 2014   

 

 [facility] [place] [facility]  [address] [id number] [place] [address]-
5024  17 Mar, 2014   

 

 [facility] [place] [facility]  [address] [id number] [place] [address]-
9532     

 

 [facility]  [address] [id number] [place] [address]-
4542     

 

 [facility]  [address] [id number] [place] [address]-
6682     

 

 [facility] [place] [facility]  [address] [id number] [place] [address]-
3631     

 

 [facility]  [address] [id number] [place] [address]-
3457  25 Oct, 2013   

 

 [facility] [place] [facility]  [address] [id number] [place] [address]-
7317  25 Oct, 2013   

 

 [facility]  [address] [id number] [place] [address]-
5343  23 Sep, 2013   

 

 [facility]  [address] [id number] [place] [address]-
5066  06 Aug, 2013   

 

 [facility]  [address] [id number] [place] [address]-
4194  05 Aug, 2013   

 

 [facility]  [address] [id number] [place] [address]-
6389     

 

 [facility] [place] [facility]  [address] [id number] [place] [address]-
2525     

 

 [facility] [place] [facility]  [address] [id number] [place] [address]-
2869     

 

 [facility] [place] [facility]  [address] [id number] [place] [address]-
3790  28 Dec, 2012   

 

 [facility] [place] [facility]  [address] [id number] [place] [address]-
2956  19 Dec, 2012   

 

 [facility] [place] [facility]  [address] [id number] [place] [address]-
2546  19 Dec, 2012   

 

 [facility] [place] [facility]  [address] [id number] [place] [address]-
1068  13 Dec, 2012   

 

 [facility] [place] [facility]  [address] [id number] [place] [address]-
6986  13 Dec, 2012   

 

 [facility] [place] [facility]  [address] [id number] [place]
8218     

 

 [facility] [place] [facility]  [address] [id number] [place] [address]-
[address]48     

 

 [facility] [place] [facility]  [address] [id number] [place] [address]-
4759  19 Oct, 2012   

 

 [facility] [place] [facility]  [address] [id number] [place] [address]-
4062  18 Oct, 2012   

 

 [facility] [place] [facility]  [address] [id number] [place] [address]-
1749  17 Oct, 2012   

 

 [facility] [place] [facility]  [address] [id number] [place] [address]-
0912  17 Oct, 2012   

 

 [facility] [place] [facility]  [address] [id number] [place] [address]-
2540  16 Oct, 2012   

 

 [facility] [place] [facility]  [address] [id number] [place] [address]-
9873  16 Oct, 2012   

 

 [facility] [place] [facility]  [address] [id number] [place] [address]-
2225  15 Oct, 2012   

 

 [facility] [place] [facility]  [address] [id number][place] [address]-
3799  27 Sep, 2012   

 

 [facility] [place] [facility]  [address] [id number] [place] [address]-
4879  26 Sep, 2012   

 

 [facility] [place] [facility]  [address] [id number] [place] [address]-
4376     

 

 [facility] [place] [facility]  [address] [id number] [place] [address]-
2594     

 

 [facility] [place] [facility]  [address] [id number] [place] [address]-
2771     

 

 [facility] [place] [facility]  [address] [id number] [place] [address]-
3906     

 

 [facility]  [address] [id number] [place] [address]-
5587     

 

 [facility] [place] [facility]  [address] [id number] [place] [address]
2546     

 

 [facility]  [address] [id number] [place] [address]-
0176     

 

 [facility]  [address] [id number] [place] [address]-
5203     

 

 [facility]  [address] [id number] [place] [address]-
3246  01 Mar, 2012   

 

 [facility]  [address] [id number] [place] [address]-
3026     

 

 [facility]  [address] [id number] [place] [address]-
9187     

 

 [facility]  [address] [id number] [place] [address]-
5137     

 

 [facility]  [address] [id number] [place] [address]-
9929  10 Andrei, 2012   

 

 [facility]  [address] [id number] [place] [address]-
8896     

 

 [facility]  [address] [id number] [place] [address]-
3609  20 Dec, 2011   

 

 [place] [facility]  [address] [id number] [place] [address]-
[address]  20 Dec, 2011   

 

 [facility] [place] [facility]  [address] [id number] [place] [address]-
3646  13 Dec, 2011   

 

 [facility]  [address] [id number] [place] [address]-
2546  08 Dec, 2011   

 

 [facility]  [address] [id number] [place] [address]-
4047  08 Dec, 2011   

 

 [facility]  [address] [facility] [id number][place] [address]-
7353  06 Dec, 2011   

 

 [facility]  [address] [facility] [id number], [place] [address]-
4759  10 Oct, 2011   

 

 [facility]  [address] [facility] [id number], [place] [address]-
9419  10 Oct, 2011   

 

 [facility]  [address] [facility] [id number], [place] [address]-
1304     

 

 [facility]  [address] [facility] [id number], [place] [address]-
3709  20 Dec, 2010   

 

 [facility]  [address] [facility] [id number][place] [address]-
4401  20 Dec, 2010   

 

 [facility]  [id number], [place] [address]-
1307     

 

 [facility]  [id number] [address]-
7890     

 

 [facility]  [id number], [place] [address]-
3328  15 Oct, 2009   

 

 [facility]  [id number] [address]-
4439  15 Oct, 2009   

 

 [facility]  [address] [id number] [address]-
8790     







IMMUNIZATIONS

No Known Immunizations



SOCIAL HISTORY

Never Assessed



REASON FOR VISIT

Controlled Med Update



PLAN OF CARE





VITAL SIGNS





MEDICATIONS

Unknown Medications



RESULTS

No Results



PROCEDURES

No Known procedures



INSTRUCTIONS





MEDICATIONS ADMINISTERED

No Known Medications



MEDICAL (GENERAL) HISTORY







 Type  Description  Date

 

 Medical History  hypothyroidism   

 

 Medical History  type II diabetes   

 

 Medical History  back pain   

 

 Medical History  hyperlipidemia   

 

 Medical History  anxiety   

 

 Medical History  mood disorder   

 

 Medical History  heart murmur   

 

 Medical History  chronic bronchitis   

 

 Medical History  [place]   

 

 Surgical History  appendectomy   

 

 Hospitalization History  childbirth   

 

 Hospitalization History  pancreatitis  2005

 

 Hospitalization History  appendectomy   

 

 Hospitalization History  Anaphylaxis to Bactrim  2016

## 2019-01-30 NOTE — XMS REPORT
[facility]

 Created on: 2018



Leonor Libra

External Reference #: [id number]

: 1965

Sex: Female



Demographics







 Address  [address]  [address]

 

 Preferred Language  Unknown

 

 Marital Status  Unknown

 

 Sikhism Affiliation  Unknown

 

 Race  Unknown

 

 Ethnic Group  Unknown





Author







 Author  ANIKET MAYFIELD

 

 [facility]

 

 Address  [address] [place]  [address]



 

 Phone  [phone]







Care Team Providers







 Care Team Member Name  Role  Phone

 

 ANIKET MAYFIELD  Unavailable  [phone]







PROBLEMS







 Type  Condition  ICD9-CM Code  LAA23-GM Code  Onset Dates  Condition Status  
SNOMED Code

 

 Problem  Acquired hypothyroidism     E03.9     Active  938775448

 

 Problem  Diabetes     E11.9     Active  060257195

 

 Problem  Anxiety disorder, unspecified     F41.9     Active  [id number]







ALLERGIES

No Information



ENCOUNTERS







 Encounter  Location  Date  Diagnosis

 

 [place] [address] [address]-
6843     

 

 [facility]  [address] [address]-
7876  20 Mar, 2018   

 

 [facility]  [address] [address]-
0477     

 

 [facility]  [address] [address]-
3349     

 

 [facility]  [address] [address]-
3267  10 Andrei, 2018  Diabetes E11.9 and Drug-induced acute pancreatitis with 
uninfected necrosis K85.31

 

 [facility]  [address] [place] [id number] [place] [address]-
1262  27 Dec, 2017   

 

 [facility]  [address] [address]-
0008     

 

 [facility]  [address] [address]
-2220  10 2017  Crushing injury of right foot, initial encounter S97.81XA

 

 [facility]  [address] [address]-
3838  27 Sep, 2017   

 

 [facility]  [address], [place] [address]-
2746  21 Sep, 2017   

 

 [facility]  [address], [place] [address]
-6[address]  19 Sep, 2017  Acute non-recurrent pansinusitis J01.40

 

 [facility]  [address], [place] [address]-
2884  19 Sep, 2017   

 

 [facility]  [id number] [place] [address]-
2821  04 Aug, 2017   

 

 [facility]  [id number] [place] [address]-
9555     

 

 [facility]  [id number] [place] [address]-
3077  26 May, 2017   

 

 [facility]  [id number] [place] [address]-
9827  08 May, 2017  Diabetes E11.9 ; Anxiety disorder, unspecified F41.9 and 
Acquired hypothyroidism E03.9

 

 [facility]  [address] [address]-
8226  01 May, 2017   

 

 [facility]  [id number] [address]-
1146     

 

 [facility]  [address], [place] [address]-
7779     

 

 [facility]  [id number] [address]-
2751  06 Mar, 2017   

 

 [facility]  [address] [address]-
3861     

 

 [facility]  [address] [address]-
4240     

 

 [facility]  [address] [address]-
9086     

 

 [facility]  [id number] [place] [address]-
3670     

 

 [facility]  [id number] [place] [address] 19927-
2665    Acute non-recurrent maxillary sinusitis J01.00

 

 [facility]  [id number] [place] [address] 6[address]98-
8290     

 

 [facility]  [id number] [place] [address]-
7490     

 

 [facility]  [id number] [place] [address]34-
7436  12 Dec, 2016   

 

 [facility]  [id number] [place] [address]-
2448  15 Nov, 2016   

 

 [facility]  [id number] [place] [address]896-
4115  12 Oct, 2016  Diabetes E11.9

 

 [facility]  [id number] [place] [address]-
0620  28 Sep, 2016  Pelvic pain R10.2 ; Leukocytosis, unspecified D72.829 ; 
Constipation, unspecified constipation type K59.00 and History of pancreatitis 
Z87.19

 

 [facility]  [address] [place] [id number] [place] [address]-
1193  22 Sep, 2016   

 

 [facility]  [address] [id number] [place] [address]-
2671  14 Sep, 2016  Diabetes E11.9

 

 [facility]  [id number] [place] [address]-
4112  13 Sep, 2016   

 

 [facility]  [id number] [place] [address] 17173-
6150  09 Sep, 2016  Vaginal yeast infection B37.3

 

 [facility]  [address] [place] [id number] [place] [address] 30698-
3018  08 Sep, 2016   

 

 [facility]  [id number] [place] [address] 62683-
2140  30 Aug, 2016  Diabetes E11.9

 

 [facility]  [address] [address]-
3183  25 Aug, 2016  Anxiety disorder, unspecified F41.9

 

 [facility]  [address] [address]-
9238  17 Aug, 2016  Vaginal yeast infection B37.3

 

 [facility]  [address] [place] [address]-
3992  17 Aug, 2016   

 

 [facility]  [address] [place] [address]-
8280    Anxiety disorder, unspecified F41.9

 

 [facility]  [address] [place] [address] 34673-
2141    Vaginal yeast infection B37.3

 

 [facility]  [address] [place] [address] 09[address]-
3281    Anxiety disorder, unspecified F41.9

 

 [facility]  [address] [place] [address] 44[address]-
9928    Anxiety disorder, unspecified F41.9

 

 [facility]  [address] [place] [address] 82392-
1190  06 May, 2016  Anxiety disorder, unspecified F41.9

 

 [facility]  [address] [address]-
2253  04 May, 2016  Diabetes E11.9

 

 [facility]  [address] [address]-
6823    Anxiety disorder, unspecified F41.9

 

 [facility]  [address] [address]-
5046     

 

 [facility]  [address] 1436[address]-
5515  25 Mar, 2016  Vaginal yeast infection B37.3

 

 [facility]  [address] [address]-
5949  15 Mar, 2016   

 

 [facility]  [address], [place] [address]-
3962     

 

 [facility]  [address] [id number] [place] [address] 24127-
9586     

 

 [facility]  [id number] [place] [address] 15222-
3143     

 

 [facility]  [id number] [place] [address] 01393-
8681  15 Andrei, 2016   

 

 [facility]  [id number] [place] [address] 080982-
8623  22 Dec, 2015   

 

 [facility]  [id number] [place] [address]-
3566    Diabetes E11.9 ; Acquired hypothyroidism E03.9 ; 
Hyperlipidemia, unspecified hyperlipidemia E78.5 and Anxiety F41.9

 

 [facility]  [id number] [place] [address] 25103-
0340     

 

 [facility]  [id number] [place] [address] 00166-
1250  27 Oct, 2015   

 

 [facility]  [id number] [place] [address] 620679-
2597  29 Sep, 2015   

 

 [facility]  [id number] [place] [address] 39426-
6446  01 Sep, 2015   

 

 [facility]  [id number] [place] [address] 45512-
1583  04 Aug, 2015   

 

 [facility]  [address] [place] [address] 71734-
7425  15 Jul, 2015   

 

 [facility]  [id number] [place] [address]96-
3703    DUB (dysfunctional uterine bleeding) 626.8 and Pap test, as 
part of routine gynecological examination V76.2

 

 [facility]  [id number] [place] [address] 78231-
2618     

 

 [facility]  [address] [id number] [place] [address]-
4784     

 

 [facility]  [address] [id number] [place] [address]-
2506     

 

 [facility]  [address] [facility] [id number] [place] [address]-
6459     

 

 [facility]  [address] [id number] [place] [address]-
1629  15 Eagle, 2015  Diabetes 250.00

 

 [facility]  [address] [id number] [place] [address]-
3625     

 

 [facility]  [address] [id number] [place] [address]-
5816  19 May, 2015   

 

 [facility]  [address] [id number] [place] [address]-
5254  13 May, 2015  Diabetes 250.00

 

 [facility]  [address] [id number] [place] [address]-
9213  12 May, 2015   

 

 [facility]  [address] [id number] [place] [address]-
1638  07 May, 2015   

 

 [facility]  [address] [facility] [id number] [place] [address]-
7761  07 May, 2015   

 

 [facility]  [address] [id number] [place] [address]-
7888  05 May, 2015   

 

 [facility]  [address] [id number] [place] [address]-
4496  01 May, 2015   

 

 [facility]  [address] [id number] [place] [address]-
2293     

 

 [facility]  [address] [id number] [place] [address]-
1839     

 

 [facility]  [address] [facility] [id number] [place] [address]-
4733     

 

 [facility] [place] [facility]  [address] [id number] [place] [address]-
7674  17 Mar,    

 

 [facility] [place] [facility]  [address] [id number] [place] [address]-
1121  17 Mar,    

 

 [facility] [place] [facility]  [address] [id number] [place] [address]-
5506  17 Mar,    

 

 [facility] [place] [facility]  [address] [id number] [place] [address]-
6290  17 Mar,    

 

 [facility] [place] [facility]  [address] [id number] [place] [address]-
9936  09 Mar,    

 

 [facility] [place] [facility]  [address] [id number] [place] [address]-
3361  09 Mar,    

 

 [facility] [place] [facility]  [address] [facility] [id number] [place] [address]-
6227  06 Mar,    

 

 [facility] [place] [facility]  [address] [id number] [place] [address]-
9439  03 Mar,    

 

 [facility] [place] [facility]  [address] [id number] [place] [address]-
3642  03 Mar, 2015   

 

 [facility] [place] [facility]  [address] [id number] [place] [address]-
0961  ,    

 

 [facility] [place] [facility]  [address] [facility] [id number] [place] [address]-
0178  ,    

 

 [facility] [place] [facility]  [address] [id number] [place] [address]-
4421  ,    

 

 [facility] [place] [facility]  [address] [id number] [place] [address]-
8779  ,    

 

 [facility] [place] [facility]  [address] [id number] [place] [address]-
6656  ,    

 

 [facility] [place] [facility]  [address] [id number] [place] [address]-
8407  ,    

 

 [facility] [place] [facility]  [address] [id number] [place] [address]-
1256     

 

 [facility] [place] [facility]  [address] [id number] [place] [address]-
2227     

 

 [facility] [place] [facility]  [address] [id number] [place] [address]-
9325     

 

 [facility] [place] [facility]  [address] [id number] [place] [address]-
0838     

 

 [facility] [place] [facility]  [address] [id number] [place] [address]-
6078     

 

 [facility] [place] [facility]  [address] [id number] [place] [address]-
8129     

 

 [facility] [place] [facility]  [address] [id number] [place] [address]-
2787     

 

 [facility] [place] [facility]  [address] [id number] [place] [address]-
1335     

 

 [facility] [place] [facility]  [address] [id number] [place] [address]-
6240     

 

 [facility] [place] [facility]  [address] [id number] [place] [address]-
9031     

 

 [facility] [place] [facility]  [address] [id number] [place] [address]-
9556     

 

 [facility] [place] [facility]  [address] [id number] [address]-
2404     

 

 [facility] [place] [facility]  [address] [id number] [place] [address]-
4458     

 

 [facility] [place] [facility]  [address] [id number] [place] [address]-
2308     

 

 [facility] [place] [facility]  [address] [id number] [place] [address]-
4115  23 Dec, 2014   

 

 [facility] [place] [facility]  [address] [id number] [place] [address]-
9105  23 Dec, 2014   

 

 [facility] [place] [facility]  [address] [id number] [place] [address]-
9421  22 Dec, 2014   

 

 [facility] [place] [facility]  [address] [id number] [place] [address]-
5629  22 Dec, 2014   

 

 [facility] [place] [facility]  [address] [id number] [place] [address]-
8886  17 Dec, 2014   

 

 [facility] [place] [facility]  [address] [id number] [place] [address]-
5576  17 Dec, 2014   

 

 [facility] [place] [facility]  [address] [id number] [place] [address]-
3623  15 Dec, 2014   

 

 [facility] [place] [facility]  [address] [id number] [place]
8257  15 Dec, 2014   

 

 [facility] [place] [facility]  [address] [id number] [place] [address]-
3864  12 Dec, 2014   

 

 [facility] [place] [facility]  [address] [id number] [place] [address]-
4594  08 Dec, 2014   

 

 [facility] [place] [facility]  [address] [id number] [place] [address]-
0172  08 Dec, 2014   

 

 [facility] [place] [facility]  [address] [id number] [place] [address]-
0815  08 Dec, 2014   

 

 [facility] [place] [facility]  [address] [id number] [place]
3806  08 Dec, 2014   

 

 [facility] [place] [facility]  [address] [id number] [place] [address]-
9816     

 

 [facility] [place] [facility]  [address] [id number] [place] [address]-
6863     

 

 [facility] [place] [facility]  [address] [id number] [place] [address]-
3210     

 

 [facility] [place] [facility]  [address] [id number] [place] [address]-
3156     

 

 [facility] [place] [facility]  [address] [id number] [place] [address]-
7190  10 Nov, 2014   

 

 [facility] [place] [facility]  [address] [id number] [place] [address]-
4831  10 Nov, 2014   

 

 [facility] [place] [facility]  [address] [id number] [place] [address]-
5283  07 Oct, 2014   

 

 [facility] [place] [facility]  [address] [id number] [place] [address]-
5309  07 Oct, 2014   

 

 [facility] [place] [facility]  [address] [id number] [place] [address]-
6432  01 Oct, 2014   

 

 [facility] [place] [facility]  [address] [id number] [place] [address]-
8024  01 Oct, 2014   

 

 [facility] [place] [facility]  [address] [id number] [place] [address]-
1997  24 Sep, 2014   

 

 [facility] [place] [facility]  [address] [id number] [place] [address]-
9779  24 Sep, 2014   

 

 [facility] [place] [facility]  [address] [id number] [place] [address]-
0982  23 Sep, 2014   

 

 [facility] [place] [facility]  [address] [id number] [place] [address]-
3893  23 Sep, 2014   

 

 [facility] [place] [facility]  [address] [id number] [place] [address]-
5967  22 Sep, 2014   

 

 [facility] [place] [facility]  [address] [id number] [place] [address]-
8599  22 Sep, 2014   

 

 [facility] [place] [facility]  [address] [id number] [address]-
0037  19 Aug, 2014   

 

 [facility] [place] [facility]  [address] [id number] [place] [address]-
1086  19 Aug, 2014   

 

 [facility] [place] [facility]  [address] [id number] [place] [address]-
4044     

 

 [facility] [place] [facility]  [address] [id number] [place] [address]-
5714     

 

 [facility] [place] [facility]  [address] [id number] [place] [address]-
1787  10 Eagle, 2014   

 

 [facility] [place] [facility]  [address] [id number] [place] [address]-
9533     

 

 [facility] [place] [facility]  [address] [id number] [place] [address]-
3823     

 

 [facility] [place] [facility]  [address] [id number] [place] [address]-
0170  07 May, 2014   

 

 [facility] [place] [facility]  [address] [id number] [place] [address]-
1753  07 May, 2014   

 

 [facility] [place] [facility]  [address] [id number] [place] [address]-
9364     

 

 [facility] [place] [facility]  [address] [id number] [place] [address]-
6265     

 

 [facility] [place] [facility]  [address] [id number] [place] [address]-
6854     

 

 [facility] [place] [facility]  [address] [id number] [place] [address]-
1048     

 

 [facility] [place] [facility]  [address] [id number] [place] [address]-
0767     

 

 [facility] [place] [facility]  [address] [id number] [place] [address]-
8514     

 

 [facility] [place] [facility]  [address] [id number] [place] [address]-
8672     

 

 [facility] [place] [facility]  [address] [id number] [place] [address]-
1532     

 

 [facility] [place] [facility]  [address] [id number] [place] [address]-
8528     

 

 [facility] [place] [facility]  [address] [id number] [place] [address]-
9998     

 

 [facility] [place] [facility]  [address] [id number] [place] [address]-
3918  17 Mar, 2014   

 

 [facility] [place] [facility]  [address] [id number] [place] [address]-
5606  17 Mar, 2014   

 

 [facility] [place] [facility]  [address] [id number] [place] [address]-
3522     

 

 [facility] [place] [facility]  [address] [id number] [place] [address]-
2546     

 

 [facility] [place] [facility]  [address] [id number] [place] [address]-
4089     

 

 [facility] [place] [facility]  [address] [id number] [place] [address]-
0496     

 

 [facility] [place] [facility]  [address] [id number] [place] [address]-
1824  25 Oct, 2013   

 

 [facility] [place] [facility]  [address] [id number] [place] [address]-
5768  25 Oct, 2013   

 

 [facility] [place] [facility]  [address] [id number] [place] [address]-
5299  23 Sep, 2013   

 

 [facility] [place] [facility]  [address] [id number] [place] [address]-
1958  06 Aug, 2013   

 

 [facility] [place] [facility]  [address] [id number] [place] [address]-
4786  05 Aug, 2013   

 

 [facility] [place] [facility]  [address] [id number] [place] [address]-
2546     

 

 [facility] [place] [facility]  [address] [id number] [place] [address]-
2546     

 

 [facility] [place] [facility]  [address] [id number] [place] [address]-
3210     

 

 [facility] [place] [facility]  [address] [id number] [place] [address]-
4876  28 Dec, 2012   

 

 [facility] [place] [facility]  [address] [id number] [place] [address]-
2546  19 Dec, 2012   

 

 [facility] [place] [facility]  [address] [id number] [address]-
4393  19 Dec, 2012   

 

 [facility] [place] [facility]  [address] [id number] [place] [address]-
8719  13 Dec, 2012   

 

 [facility] [place] [facility]  [address] [id number] [place]
5879  13 Dec, 2012   

 

 [facility] [place] [facility]  [address] [id number] [place] [address]-
3029     

 

 [facility] [place] [facility]  [address] [id number] [place]
4923     

 

 [facility] [place] [facility]  [address] [id number] [place] [address]-
9121  19 Oct, 2012   

 

 [facility] [place] [facility]  [address] [id number] [place] [address]-
5197  18 Oct, 2012   

 

 [facility] [place] [facility]  [address] [id number] [place] [address]-
5393  17 Oct, 2012   

 

 [facility] [place] [facility]  [address] [id number] [place] [address]-
0781  17 Oct, 2012   

 

 [facility] [place] [facility]  [address] [id number] [place] [address]-
4090  16 Oct, 2012   

 

 [facility] [place] [facility]  [address] [id number] [place] [address]-
9345  16 Oct, 2012   

 

 [facility] [place] [facility]  [address] [id number] [address]-
4761  15 Oct, 2012   

 

 [facility] [place] [facility]  [address] [id number][place] [address]-
7951  27 Sep, 2012   

 

 [facility] [place] [facility]  [address] [id number] [place] [address]
0052  26 Sep, 2012   

 

 [facility] [place] [facility]  [address] [id number] [place] [address]-
6259     

 

 [facility] [place] [facility]  [address] [id number] [place] [address]-
0715     

 

 [facility] [place] [facility]  [address] [id number] [place] [address]-
8027     

 

 [facility]  [address] [id number] [place] [address]-
3531     

 

 [facility] [place] [facility]  [address] [id number] [place] [address]
2546     

 

 [facility]  [address] [id number] [place] [address]-
2066     

 

 [facility] [place] [facility]  [address] [id number] [place] [address]-
0646     

 

 [facility]  [address] [id number] [place] [address]-
1530     

 

 [facility]  [address] [id number] [place] [address]-
2436  01 Mar, 2012   

 

 [facility]  [address] [id number] [place] [address]-
6887     

 

 [facility]  [address] [id number] [place] [address]-
6427     

 

 [facility]  [address] [id number] [place] [address]-
9763     

 

 [facility]  [address] [id number] [place] [address]-
6562  10 Andrei, 2012   

 

 [facility]  [address] [id number] [place] [address]-
9706     

 

 [facility]  [address] [id number] [place] [address]-
5607  20 Dec, 2011   

 

 [facility] [place] [facility]  [address] [id number] [place] [address]-
4516  20 Dec, 2011   

 

 [facility] [place] [facility]  [address] [id number] [place] [address]-
2546  13 Dec, 2011   

 

 [facility]  [address] [id number] [place] [address]-
4039  08 Dec, 2011   

 

 [facility]  [address] [facility] [id number][place] [address]-
8938  08 Dec, 2011   

 

 [facility]  [address] [facility] [id number] [id number]
[id number]  06 Dec, 2011   

 

 [facility]  [address] [facility] [id number] [address]-
3801  10 Oct, 2011   

 

 [facility]  [address] [facility] [id number] [address]-
4696  10 Oct, 2011   

 

 [facility]  [address] [facility] [id number] [address]-
2608     

 

 [facility]  [id number] [address]-
2966  20 Dec, 2010   

 

 [facility]  [id number] [address]-
5013  20 Dec, 2010   

 

 [facility]  [id number] [address]-
7570     

 

 [facility]  [id number] [address]-
9891     

 

 [facility]  [id number] [address]-
8461  15 Oct, 2009   

 

 [facility]  [address] [id number] [address]-
6893  15 Oct, 2009   

 

 [facility]  [address] [id number] [address]-
2391     







IMMUNIZATIONS

No Known Immunizations



SOCIAL HISTORY

Never Assessed



REASON FOR VISIT





PLAN OF CARE





VITAL SIGNS





MEDICATIONS

Unknown Medications



RESULTS

No Results



PROCEDURES

No Known procedures



INSTRUCTIONS





MEDICATIONS ADMINISTERED

No Known Medications



MEDICAL (GENERAL) HISTORY







 Type  Description  Date

 

 Medical History  hypothyroidism   

 

 Medical History  type II diabetes   

 

 Medical History  back pain   

 

 Medical History  hyperlipidemia   

 

 Medical History  anxiety   

 

 Medical History  mood disorder   

 

 Medical History  heart murmur   

 

 Medical History  chronic bronchitis   

 

 Medical History  [place]   

 

 Surgical History  appendectomy   

 

 Hospitalization History  childbirth   

 

 Hospitalization History  pancreatitis  2005

 

 Hospitalization History  appendectomy   

 

 Hospitalization History  Anaphylaxis to Bactrim  2016

## 2019-01-30 NOTE — XMS REPORT
[facility]

 Created on: 2018



Galvez Libra

External Reference #: [id number]

: 1965

Sex: Female



Demographics







 Address  [address]  [address]

 

 Preferred Language  Unknown

 

 Marital Status  Unknown

 

 Shinto Affiliation  Unknown

 

 Race  Unknown

 

 Ethnic Group  Unknown





Author







 Author  ANIKET MAYFIELD

 

 [facility]

 

 Address  [address] [place]  [address]



 

 Phone  [phone]







Care Team Providers







 Care Team Member Name  Role  Phone

 

 ANIKET MAYFIELD  Unavailable  [phone]







PROBLEMS







 Type  Condition  ICD9-CM Code  LPL66-RU Code  Onset Dates  Condition Status  
SNOMED Code

 

 Problem  Violation of controlled substance agreement     Z91.14     Active  
206558282

 

 Problem  Status post cholecystectomy     Z90.49     Active  527810338

 

 Problem  Diabetes     E11.9     Active  584367244

 

 Problem  Anxiety disorder, unspecified     F41.9     Active  475609696

 

 Problem  Hypertriglyceridemia     E78.1     Active  612611148

 

 Problem  Acquired hypothyroidism     E03.9     Active  109004268







ALLERGIES

No Information



ENCOUNTERS







 Encounter  Location  Date  Diagnosis

 

 [place] [id number] [place] [address] 90843-
6003     

 

 [place] [id number] [place] [address] 47322-
8278    Benzodiazepine use agreement exists Z02.89 ; Therapeutic 
drug monitoring Z51.81 and Violation of controlled substance agreement Z91.14

 

 [place] [address], [place] [address]
4020     

 

 [place] [id number] [place] [address] 40310-
0377    Ketoacidosis E87.2 ; Status post cholecystectomy Z90.49 ; 
Diabetes E11.9 ; Acquired hypothyroidism E03.9 ; Hypertriglyceridemia E78.1 and 
Vaginal yeast infection B37.3

 

 [place] [id number] [place] [address] 96997-
2217  15 May, 2018   

 

 [place] [id number] [place] [address] 23112-
5192     

 

 [place] [address] [place] [address]
9578  20 Mar, 2018   

 

 [facility] [place]  [id number] [place] [address] 81403-
0398     

 

 [facility] [place]  [id number] [place] [address] 01195-
7924     

 

 [facility] [place]  [id number] [place] [address] 20063-
5653  10 Andrei, 2018  Diabetes E11.9 and Drug-induced acute pancreatitis with 
uninfected necrosis K85.31

 

 [facility] [place]  [id number] [place] [address] 64756-
4417  27 Dec, 2017   

 

 [facility] [place]  [id number] [place] [address] 21260-
7935     

 

 [facility]  [id number] [place] [address] 62617
-7373  10 Nov, 2017  Crushing injury of right foot, initial encounter S97.81XA

 

 [facility] [place]  [id number] [place] [address] 69447-
2373  27 Sep, 2017   

 

 [facility] [place]  [id number] [place] [address] 66400-
7498  21 Sep, 2017   

 

 [facility]  [address] [place] [address] 58835
-[phone]  19 Sep, 2017  Acute non-recurrent pansinusitis J01.40

 

 [facility] [place]  [id number] [place] [address] 59127-
6372  19 Sep, 2017   

 

 [facility] [place]  [id number] [place] [address] 78937-
9888  04 Aug, 2017   

 

 [facility] [place]  [id number] [place] [address] 61011-
7569     

 

 [facility] [place]  [id number] [place] [address] 24561-
7175  26 May, 2017   

 

 [facility] [place]  [id number] [place] [address] 79257-
7171  08 May, 2017  Diabetes E11.9 ; Anxiety disorder, unspecified F41.9 and 
Acquired hypothyroidism E03.9

 

 [facility] [place]  [id number] [place] [address] 21329-
0226  01 May, 2017   

 

 [facility] [place]  [id number] [place] [address] 89080-
6740     

 

 [facility] [place]  [id number] [place] [address] 67731-
2190     

 

 [facility] [place]  [id number] [place] [address] 47962-
7233  06 Mar, 2017   

 

 [facility] [place]  [id number] [place] [address] 82383-
0988     

 

 [facility] [place]  [id number] [place] [address] 71553-
0524     

 

 [facility] [place]  [id number] [place] [address] 33686-
9327     

 

 [facility] [place]  [address] [place] [address] 06763-
5919     

 

 [facility] [place]  [id number] [place] [address] 78565-
3230    Acute non-recurrent maxillary sinusitis J01.00

 

 [facility] [place]  [id number] [place] [address] 58498-
7883     

 

 [facility] [place]  [address] [place] [address] 92927-
3020     

 

 [facility] [place]  [id number] [place] [address] 23109-
0860  12 Dec, 2016   

 

 [facility] [place]  [id number] [place] [address] 882455-
3250  15 Nov, 2016   

 

 [facility] [place]  [address] [place] [address] 82350-
4705  12 Oct, 2016  Diabetes E11.9

 

 [facility] [place]  [address], [place] [address]-
7537  28 Sep, 2016  Pelvic pain R10.2 ; Leukocytosis, unspecified D72.829 ; 
Constipation, unspecified constipation type K59.00 and History of pancreatitis 
Z87.19

 

 [facility] [place]  [id number] [place] [address] 25160-
6935  22 Sep, 2016   

 

 [facility] [place]  [id number] [place] [address] 60119-
2737  14 Sep, 2016  Diabetes E11.9

 

 [facility] [place]  [id number] [place] [address] 80047-
0911  13 Sep, 2016   

 

 [facility] [place]  [id number] [place] [address] 84361-
5448  09 Sep, 2016  Vaginal yeast infection B37.3

 

 [facility] [place]  [id number] [place] [address] 94997-
7545  08 Sep, 2016   

 

 [facility] [place]  [id number] [place] [address] 74191-
4621  30 Aug, 2016  Diabetes E11.9

 

 [facility] [place]  [id number] [place] [address] 74713-
1622  25 Aug, 2016  Anxiety disorder, unspecified F41.9

 

 [facility] [place]  [address] [place] [address] 26425-
7319  17 Aug, 2016  Vaginal yeast infection B37.3

 

 [facility] [place]  [address] [place] [address] 14182-
0540  17 Aug, 2016   

 

 [facility] [place]  [address] [place] [address] 71904-
2537    Anxiety disorder, unspecified F41.9

 

 [facility] [place]  [id number] [place] [address] 49097-
4436    Vaginal yeast infection B37.3

 

 [facility] [place]  [id number] [place] [address] 09390-
7864    Anxiety disorder, unspecified F41.9

 

 [facility] [place]  [id number] [place] [address] 25416-
8334    Anxiety disorder, unspecified F41.9

 

 [facility] [place]  [id number] [place] [address] 585258-
0442  06 May, 2016  Anxiety disorder, unspecified F41.9

 

 [facility] [place]  [id number] [place] [address] 79743-
0126  04 May, 2016  Diabetes E11.9

 

 [facility] [place]  [id number] [place] [address] 10146-
1287    Anxiety disorder, unspecified F41.9

 

 [facility] [place]  [id number] [place] [address] 66695-
6676     

 

 [facility] [place]  [id number] [place] [address] 85332-
4412  25 Mar, 2016  Vaginal yeast infection B37.3

 

 [facility] [place]  [id number] [place] [address] 48774-
5495  15 Mar, 2016   

 

 [facility] [place]  [id number] [place] [address] 25528-
2583     

 

 [facility] [place]  [id number] [place] [address] 28956-
1036     

 

 [facility] [place]  [id number] [place] [address] 66059-
1900     

 

 [facility] [place]  [address] [place] [address] 30607-
8927  15 Andrei, 2016   

 

 [facility] [place]  [id number] [place] [address] 62530-
6073  22 Dec, 2015   

 

 [facility] [place]  [id number] [place] [address] 62209-
3014    Diabetes E11.9 ; Acquired hypothyroidism E03.9 ; 
Hyperlipidemia, unspecified hyperlipidemia E78.5 and Anxiety F41.9

 

 [facility] [place]  [address], [place] [address]-
2546     

 

 [facility] [place]  [address] [address]-
8286  27 Oct, 2015   

 

 [facility] [place]  [address], [place] [address]-
2546  29 Sep, 2015   

 

 [facility] [place]  [id number] [place] [address] 64378-
2546  01 Sep, 2015   

 

 [facility] [place]  [id number] [address]-
2546  04 Aug, 2015   

 

 [facility] [place]  [id number] [place] [address] 14040-
6496  15 Jul, 2015   

 

 [facility] [place]  [address] [address]-
2016    Pap test, as part of routine gynecological examination 
V76.2 and DUB (dysfunctional uterine bleeding) 626.8

 

 [facility] [place]  [address], [place] [address]-
5696     

 

 [facility] [place]  [address] [address]-
8256     

 

 [facility] [place]  [address], [place] [address]-
6836     

 

 [facility] [place]  [address] [address]-
2546     

 

 [facility] [place]  [address] [address]-
2546  15 Eagle, 2015  Diabetes 250.00

 

 [facility] [place]  [address], [place] [address]
2546     

 

 [facility] [place]  [address] [address]-
2546  19 May, 2015   

 

 [facility] [place]  [id number] [address]-
5896  13 May, 2015  Diabetes 250.00

 

 [facility]  [address] [id number] [place] [address]-
7822  12 May, 2015   

 

 [facility]  [address] [id number] [place] [address]-
8130  07 May, 2015   

 

 [facility]  [address] [id number] [place] [address]-
5372  07 May, 2015   

 

 [facility] [place] [facility]  [address] [id number] [place] [address]-
1207  05 May, 2015   

 

 [facility]  [address] [id number] [place] [address]-
7208  01 May, 2015   

 

 [facility]  [address] [id number] [place] [address]-
9248     

 

 [facility]  [address] [id number] [place] [address]-
0818     

 

 [facility]  [address] [id number] [place] [address]-
7886     

 

 [facility] [place] [facility]  [address] [id number] [place] [address]-
5686  17 Mar, 2015   

 

 [facility] [place] [facility]  [address] [id number] [place] [address]-
0015  17 Mar, 2015   

 

 [facility] [place] [facility]  [address] [id number] [place] [address]-
0861  17 Mar, 2015   

 

 [facility] [place] [facility]  [address] [id number] [place] [address]-
7476  17 Mar, 2015   

 

 [facility] [place] [facility]  [address] [id number] [place] [address]-
2678  09 Mar, 2015   

 

 [facility] [place] [facility]  [address] [id number] [place] [address]-
5666  09 Mar, 2015   

 

 [facility] [place] [facility]  [address] [id number] [place] [address]-
0472  06 Mar, 2015   

 

 [facility] [place] [facility]  [address] [id number] [place] [address]-
8253  03 Mar, 2015   

 

 [facility] [place] [facility]  [address] [id number] [place] [address]-
9757  03 Mar, 2015   

 

 [facility] [place] [facility]  [address] [id number] [place] [address]-
8866  ,    

 

 [facility] [place] [facility]  [address] [id number] [place] [address]-
4776  ,    

 

 [facility] [place] [facility]  [address] [id number] [place] [address]-
5298  ,    

 

 [facility] [place] [facility]  [address] [id number] [place] [address]-
6269  ,    

 

 [facility] [place] [facility]  [address] [id number] [place] [address]-
4106  ,    

 

 [facility] [place] [facility]  [address] [id number] [place] [address]-
5055  ,    

 

 [facility] [place] [facility]  [address] [id number] [place] [address]-
0808     

 

 [facility] [place] [facility]  [address] [id number] [place] [address]-
4924     

 

 [facility] [place] [facility]  [address] [id number] [place] [address]-
7231     

 

 [facility] [place] [facility]  [address] [id number] [place] [address]-
1883     

 

 [facility] [place] [facility]  [address] [id number] [place] [address]-
8066     

 

 [facility] [place] [facility]  [address] [id number] [place] [address]-
4647     

 

 [facility] [place] [facility]  [address] [id number] [place] [address]-
8321     

 

 [facility] [place] [facility]  [address] [id number] [place] [address]-
8316     

 

 [facility] [place] [facility]  [address] [id number] [place] [address]-
3271     

 

 [facility] [place] [facility]  [address] [id number] [place] [address]-
6852     

 

 [facility] [place] [facility]  [address] [id number] [place] [address]-
5043     

 

 [facility] [place] [facility]  [address] [id number] [place] [address]-
6275     

 

 [facility] [place] [facility]  [address] [id number] [place] [address]-
6783     

 

 [facility] [place] [facility]  [address] [id number] [place] [address]-
9036     

 

 [facility] [place] [facility]  [address] [id number] [place] [address]-
2017  23 Dec, 2014   

 

 [facility] [place] [facility]  [address] [id number] [place] [address]-
4140  23 Dec, 2014   

 

 [facility] [place] [facility]  [address] [id number] [place] [address]-
8520  22 Dec, 2014   

 

 [facility] [place] [facility]  [address] [id number] [place] [address]-
8939  22 Dec, 2014   

 

 [facility] [place] [facility]  [address] [id number] [place] [address]-
2284  17 Dec, 2014   

 

 [facility] [place] [facility]  [address] [id number] [place] [address]-
3092  17 Dec, 2014   

 

 [facility] [place] [facility]  [address] [id number] [place] [address]-
0759  15 Dec, 2014   

 

 [facility] [place] [facility]  [address] [id number] [place] [address]-
6779  15 Dec, 2014   

 

 [facility] [place] [facility]  [address] [id number] [place]
6670  12 Dec, 2014   

 

 [facility] [place] [facility]  [address] [id number], [place] [address]-
9407  08 Dec, 2014   

 

 [facility] [place] [facility]  [address] [id number] [place] [address]-
0826  08 Dec, 2014   

 

 [facility] [place] [facility]  [address] [id number] [place] [address]-
7570  08 Dec, 2014   

 

 [facility] [place] [facility]  [address] [id number] [place] [address]-
1642  08 Dec, 2014   

 

 [facility] [place] [facility]  [address] [id number] [place] [address]-
1109     

 

 [facility] [place] [facility]  [address] [id number] [place] [address]-
9871     

 

 [facility] [place] [facility]  [address] [id number] [place] [address]-
7666     

 

 [facility] [place] [facility]  [address] [id number] [place] [address]-
7938     

 

 [facility] [place] [facility]  [address] [id number], [place] [address]-
2799  10 Nov, 2014   

 

 [facility] [place] [facility]  [address] [id number] [place] [address]-
9723  10 Nov, 2014   

 

 [facility] [place] [facility]  [address] [facility] [id number] [place] [address]-
0877  07 Oct, 2014   

 

 [facility] [place] [facility]  [address] [id number] [place] [address]-
7817  07 Oct, 2014   

 

 [facility] [place] [facility]  [address] [id number], [place] [address]-
1425  01 Oct, 2014   

 

 [facility] [place] [facility]  [address] [id number], [place] [address]-
0109  01 Oct, 2014   

 

 [facility] [place] [facility]  [address] [id number], [place] [address]-
4035  24 Sep, 2014   

 

 [facility] [place] [facility]  [address] [id number][place] [address]-
7162  24 Sep, 2014   

 

 [facility] [place] [facility]  [address] [id number] [place] [address]-
8238  23 Sep, 2014   

 

 [facility] [place] [facility]  [address] [id number] [place] [address]-
9443  23 Sep, 2014   

 

 [facility] [place] [facility]  [address] [id number] [place] [address]-
3184  22 Sep, 2014   

 

 [facility] [place] [facility]  [address] [id number] [place] [address]-
7240  22 Sep, 2014   

 

 [facility] [place] [facility]  [address] [id number] [place] [address]-
0363  19 Aug, 2014   

 

 [facility] [place] [facility]  [address] [id number] [place] [address]-
2012  19 Aug, 2014   

 

 [facility] [place] [facility]  [address] [id number] [place] [address]-
6735     

 

 [facility] [place] [facility]  [address] [id number] [place] [address]-
4592     

 

 [facility] [place] [facility]  [address] [id number] [place] [address]-
1107  10 Eagle, 2014   

 

 [facility] [place] [facility]  [address] [id number] [place] [address]-
9449     

 

 [facility] [place] [facility]  [address] [id number] [place] [address]-
7359     

 

 [facility] [place] [facility]  [address] [id number] [place] [address]-
7524  07 May, 2014   

 

 [facility] [place] [facility]  [address] [id number] [place] [address]-
7493  07 May, 2014   

 

 [facility] [place] [facility]  [address] [id number] [place] [address]-
4703     

 

 [facility] [place] [facility]  [address] [id number] [place] [address]-
3739     

 

 [facility] [place] [facility]  [address] [id number] [place] [address]-
6270     

 

 [facility] [place] [facility]  [address] [id number] [place]
3744     

 

 [facility] [place] [facility]  [address] [id number] [place] [address]-
1159     

 

 [facility] [place] [facility]  [address] [id number] [place] [address]-
5479     

 

 [facility] [place] [facility]  [address] [id number] [place] [address]-
1821     

 

 [facility] [place] [facility]  [address] [id number] [place] [address]-
6396     

 

 [facility] [place] [facility]  [address] [id number] [place] [address]-
6510     

 

 [facility] [place] [facility]  [address] [id number] [place] [address]-
0416     

 

 [facility] [place] [facility]  [address] [id number] [place] [address]-
0970  17 Mar, 2014   

 

 [facility] [place] [facility]  [address] [id number] [place] [address]-
2875  17 Mar, 2014   

 

 [facility] [place] [facility]  [address] [id number] [place] [address]-
4499     

 

 [facility] [place] [facility]  [address] [id number], [place] [address]-
0473     

 

 [facility] [place] [facility]  [address] [id number][place] [address]-
8321     

 

 [facility] [place] [facility]  [address] [id number] [place] [address]-
1785     

 

 [facility] [place] [facility]  [address] [id number] [place] [address]-
8044  25 Oct, 2013   

 

 [facility] [place] [facility]  [address] [id number] [place] [address]-
4335  25 Oct, 2013   

 

 [facility] [place] [facility]  [address] [id number] [place] [address]-
5526  23 Sep, 2013   

 

 [facility]  [address] [id number] [place] [address]-
3904  06 Aug, 2013   

 

 [facility]  [address] [id number] [place] [address]
2546  05 Aug, 2013   

 

 [facility]  [address] [id number] [place] [address]-
2245     

 

 [facility]  [address] [id number] [place] [address]-
1196     

 

 [facility]  [address] [id number] [place] [address], [place] [address]-
6143     

 

 [facility]  [address] [id number] [place] [address]-
5789  28 Dec, 2012   

 

 [facility]  [address] [id number] [place] [address]-
9302  19 Dec, 2012   

 

 [facility]  [address] [id number] [place] [address]-
7883  19 Dec, 2012   

 

 [facility]  [address] [facility] [id number] [place] [address]-
7626  13 Dec, 2012   

 

 [facility]  [address] [facility] [id number] [place] [address]-
4804  13 Dec, 2012   

 

 [facility]  [address] [id number] [place] [address]-
5558     

 

 [facility]  [address] [id number] [place] [address]-
5790     

 

 [facility] [place] [facility]  [address] [id number] [place] [address]-
4524  19 Oct, 2012   

 

 [facility] [place] [facility]  [address] [id number] [place] [address]-
7136  18 Oct, 2012   

 

 [facility]  [address] [id number] [place] [address]-
1815  17 Oct, 2012   

 

 [facility] [place] [facility]  [address] [id number] [place] [address]-
8363  17 Oct, 2012   

 

 [facility] [place] [facility]  [address] [id number] [place] [address]-
8891  16 Oct, 2012   

 

 [facility] [place] [facility]  [address] [id number] [place] [address]-
4133  16 Oct, 2012   

 

 [facility] [place] [facility]  [address] [id number] [place] [address]-
8958  15 Oct, 2012   

 

 [facility] [place] [facility]  [address] [id number] [place] [address]-
9453  27 Sep, 2012   

 

 [facility] [place] [facility]  [address] [id number] [place] [address]-
7974  26 Sep, 2012   

 

 [facility] [place] [facility]  [address] [id number] [place] [address]-
4041     

 

 [facility] [place] [facility]  [address] [id number], [place] [address]-
1100     

 

 [facility] [place] [facility]  [address] [id number] [place] [address]-
9846     

 

 [facility] [place] [facility]  [address] [facility] [id number], [place] [address]-
3704     

 

 [facility] [place] [facility]  [address] [facility] [id number], [place] [address]-
8265     

 

 [facility] [place] [facility]  [address] [id number], [place] [address]-
1514     

 

 [facility] [place] [facility]  [address] [id number], [place] [address]-
0787     

 

 [facility] [place] [facility]  [address] [id number], [place] [address]-
3949     

 

 [facility] [place] [facility]  [address] [facility] [id number], [place] [address]-
8369  01 Mar, 2012   

 

 [facility] [place] [facility]  [address] [id number], [place] [address]-
0552  06 2012   

 

 [facility]  [address] [id number] [place] [address]-
6296     

 

 [facility]  [address] [id number] [place] [address]-
6676     

 

 [facility]  [address] [facility] [id number] [place] [address]-
5048  10 Andrei, 2012   

 

 [facility]  [address] [id number] [place] [address]-
6956     

 

 [facility]  [address] [id number] [place] [address]-
3274  20 Dec, 2011   

 

 [facility]  [address] [id number] [place] [address]-
8635  20 Dec, 2011   

 

 [facility]  [address] [facility] [id number] [place] [address]-
2844  13 Dec, 2011   

 

 [facility]  [address] [id number] [place] [address]-
5121  08 Dec, 2011   

 

 [facility]  [address] [facility] [id number] [place] [address]-
1466  08 Dec, 2011   

 

 [facility]  [address] [facility] [id number] [place] [address]-
5991  06 Dec, 2011   

 

 [facility]  [address] [id number] [place] [address]-
7564  10 Oct, 2011   

 

 [facility] [place] [facility]  [address] [id number], [place] [address]-
5031  10 Oct, 2011   

 

 [facility] [place] [facility]  [address] [id number] [place] [address]-
5807     

 

 [facility] [place] [facility]  [address] [id number] [place] [address]-
2500  20 Dec, 2010   

 

 [facility] [place] [facility]  [address] [facility] [id number] [place] [address]-
7840  20 Dec, 2010   

 

 [facility] [place] [facility]  [address] [facility] [id number], [place] [address]-
4176     

 

 [facility] [place]  [address] [id number], [place] [address]-
1100     

 

 [facility] [place]  [address] [id number], [place] [address]-
6916  15 Oct, 2009   

 

 [facility] [place]  [address] [facility] [id number], [place] [address]-
6566  15 Oct, 2009   

 

 [facility] [place]  [address] [facility] [id number], [place] [address]-
8224     







IMMUNIZATIONS

No Known Immunizations



SOCIAL HISTORY

Never Assessed



REASON FOR VISIT

Controlled Med Refill 18



PLAN OF CARE





VITAL SIGNS





MEDICATIONS







 Medication  Instructions  Dosage  Frequency  Start Date  End Date  Duration  
Status

 

 Xanax 1 MG  Orally Once a day  1 tablet  24h       28 days  Active







RESULTS

No Results



PROCEDURES

No Known procedures



INSTRUCTIONS





MEDICATIONS ADMINISTERED

No Known Medications



MEDICAL (GENERAL) HISTORY







 Type  Description  Date

 

 Medical History  hypothyroidism   

 

 Medical History  type II diabetes   

 

 Medical History  back pain   

 

 Medical History  hyperlipidemia   

 

 Medical History  anxiety   

 

 Medical History  mood disorder   

 

 Medical History  heart murmur   

 

 Medical History  chronic bronchitis   

 

 Medical History  [place]   

 

 Surgical History  appendectomy   

 

 Surgical History  gallbladder removal   

 

 Hospitalization History  childbirth   

 

 Hospitalization History  pancreatitis  2005

 

 Hospitalization History  appendectomy   

 

 Hospitalization History  Anaphylaxis to Bactrim  2016

## 2019-01-30 NOTE — XMS REPORT
[facility]

 Created on: 2017



Libra Galvez

External Reference #: [id number]

: 1965

Sex: Female



Demographics







 Address  [address]  [address]

 

 Preferred Language  Unknown

 

 Marital Status  Unknown

 

 [employer] Affiliation  Unknown

 

 Race  Unknown

 

 Ethnic Group  Unknown





Author







 Author  BULMARO OVALLE

 

 [facility]

 

 Address  [address] [place]  [address]



 

 Phone  [phone]







Care Team Providers







 Care Team Member Name  Role  Phone

 

 BULMARO OVALLE  Unavailable  [phone]







PROBLEMS







 Type  Condition  ICD9-CM Code  QLA09-RN Code  Onset Dates  Condition Status  
SNOMED Code

 

 Problem  Acquired hypothyroidism     E03.9     Active  140670897

 

 Problem  Diabetes     E11.9     Active  984321345

 

 Problem  Anxiety disorder, unspecified     F41.9     Active  [id number]







ALLERGIES

No Information



SOCIAL HISTORY

Never Assessed



PLAN OF CARE





VITAL SIGNS





MEDICATIONS

Unknown Medications



RESULTS

No Results



PROCEDURES

No Known procedures



IMMUNIZATIONS

No Known Immunizations



MEDICAL (GENERAL) HISTORY







 Type  Description  Date

 

 Medical History  hypothyroidism   

 

 Medical History  type II diabetes   

 

 Medical History  back pain   

 

 Medical History  hyperlipidemia   

 

 Medical History  anxiety   

 

 Medical History  mood disorder   

 

 Medical History  heart murmur   

 

 Medical History  chronic bronchitis   

 

 Medical History  [place]   

 

 Surgical History  appendectomy   

 

 Hospitalization History  childbirth   

 

 Hospitalization History  pancreatitis  2005

 

 Hospitalization History  appendectomy   

 

 Hospitalization History  Anaphylaxis to Bactrim  2016

## 2019-01-30 NOTE — XMS REPORT
[facility]

 Created on: 2018



Libra Galvez

External Reference #: [id number]

: 1965

Sex: Female



Demographics







 Address  [address] [place] [address]  [address]

 

 Preferred Language  Unknown

 

 Marital Status  Unknown

 

 Cheondoism Affiliation  Unknown

 

 Race  Unknown

 

 Ethnic Group  Unknown





Author







 Author  ANIKET MAYFIELD

 

 Organization  [facility]

 

 Address  [address] [place]  [address]



 

 Phone  [phone]







Care Team Providers







 Care Team Member Name  Role  Phone

 

 ANIKET MAYFIELD  Unavailable  [phone]







PROBLEMS







 Type  Condition  ICD9-CM Code  NBN90-PA Code  Onset Dates  Condition Status  
SNOMED Code

 

 Problem  Anxiety disorder, unspecified     F41.9     Active  126186350

 

 Problem  Violation of controlled substance agreement     Z91.14     Active  
[id number]

 

 Problem  Intestinal malabsorption, unspecified     K90.9     Active  52121435

 

 Problem  Acquired hypothyroidism     E03.9     Active  054830664

 

 Problem  Diabetes     E11.9     Active  363266911

 

 Problem  Hypertriglyceridemia     E78.1     Active  410162815

 

 Problem  Status post cholecystectomy     Z90.49     Active  410475054







ALLERGIES

No Information



ENCOUNTERS







 Encounter  Location  Date  Diagnosis

 

 [facility]  [address] [address]-
3910  01 Oct, 2018   

 

 [facility]  [address] [address]-
7029  21 Aug, 2018   

 

 [facility]  [id number] [place] [address]-
4384    Intestinal malabsorption, unspecified K90.9 ; Diarrhea, 
unspecified R19.7 ; Diabetes E11.9 and Marijuana smoker F12.90

 

 [facility]  [id number] [place] [address] 
[id number]    Dental examination Z01.20

 

 [facility]  [address] 
[id number]  10 Jul, 2018  Dental examination Z01.20 and Dental caries K02.9

 

 [facility]  [address] [address]-
8074    Benzodiazepine use agreement exists Z02.89 ; Therapeutic 
drug monitoring Z51.81 and Violation of controlled substance agreement Z91.14

 

 [facility]  [id number] [place] [address]47-
1025     

 

 [facility]  [address] [place] [address] 72663-
8373    Ketoacidosis E87.2 ; Status post cholecystectomy Z90.49 ; 
Diabetes E11.9 ; Acquired hypothyroidism E03.9 ; Hypertriglyceridemia E78.1 and 
Vaginal yeast infection B37.3

 

 [place] [id number] [place] [address]94-
6686  15 May, 2018   

 

 [place] [address] [place] [address]607-
2437     

 

 [facility]  [address] [place] [address] 02391-
9731  20 Mar, 2018   

 

 [place] [address] [place] [address]-
9940     

 

 [facility]  [address] [place] [address]01-
3389     

 

 [place] [address] [place] [address] 01755-
1850  10 Andrei, 2018  Diabetes E11.9 and Drug-induced acute pancreatitis with 
uninfected necrosis K85.31

 

 [place] [address] [place] [address] 32820-
2439  27 Dec, 2017   

 

 [facility]  [address] [place] [address] 16088-
6669     

 

 [facility]  [address] [place] [address]
-[phone]  10 Nov, 2017  Crushing injury of right foot, initial encounter S97.81XA

 

 [facility]  [address] [place] [address] 51463-
8856  27 Sep, 2017   

 

 [facility]  [address] [place] [address] 39773-
3147  21 Sep, 2017   

 

 [facility]  [address] [address]
-8919  19 Sep, 2017  Acute non-recurrent pansinusitis J01.40

 

 [facility]  [id number] [address]-
7851  19 Sep, 2017   

 

 [facility]  [address] [place] [address] 98386-
3076  04 Aug, 2017   

 

 [facility]  [address] [place] [address] 62354-
1907     

 

 [facility]  [address] [place] [address] 17521-
2732  26 May, 2017   

 

 [facility]  [address] [place] [address] 96935-
7696  08 May, 2017  Diabetes E11.9 ; Anxiety disorder, unspecified F41.9 and 
Acquired hypothyroidism E03.9

 

 [facility]  [address] [place] [address] 48179-
7265  01 May, 2017   

 

 [facility]  [address] [place] [address] 35166-
5986     

 

 [facility]  [address] [place] [address] 69875-
8925     

 

 [facility]  [address] [address]-
6801  06 Mar, 2017   

 

 [facility]  [address] [place] [address] 75666-
7258     

 

 [facility]  [address] [address]-
8328     

 

 [facility]  [address] [place] [address] 73390-
6553     

 

 [facility]  [address] [address]-
9033     

 

 [facility]  [address] [place] [address] 70078-
8589    Acute non-recurrent maxillary sinusitis J01.00

 

 [facility]  [address] [place] [address] 38817-
0973     

 

 [facility]  [address] [place] [address] 83051-
5190     

 

 [facility]  [address] [place] [address] 60142-
5612  12 Dec, 2016   

 

 [facility]  [address] [place] [address] 05745-
0453  15 Nov, 2016   

 

 [facility]  [address] [place] [address] 77511-
9057  12 Oct, 2016  Diabetes E11.9

 

 [facility]  [address] [place] [address] 72517-
3300  28 Sep, 2016  Pelvic pain R10.2 ; Leukocytosis, unspecified D72.829 ; 
Constipation, unspecified constipation type K59.00 and History of pancreatitis 
Z87.19

 

 [facility]  [address] [place] [address] 02498-
7189  22 Sep, 2016   

 

 [facility]  [address] [place] [address] 25947-
8848  14 Sep, 2016  Diabetes E11.9

 

 [facility]  [address] [place] [address] 91117-
1662  13 Sep, 2016   

 

 [facility]  [address] [place] [address] 49572-
3761  09 Sep, 2016  Vaginal yeast infection B37.3

 

 [facility]  [address] [place] [address] 21985-
1901  08 Sep, 2016   

 

 [facility]  [address] [place] [address] 95209-
7463  30 Aug, 2016  Diabetes E11.9

 

 [facility]  [address] [place] [address] 95344-
1140  25 Aug, 2016  Anxiety disorder, unspecified F41.9

 

 [facility]  [address] [facility] [id number] [address]-
9843  17 Aug, 2016  Vaginal yeast infection B37.3

 

 [facility]  [address] [facility] [id number] [address]-
9842  17 Aug, 2016   

 

 [facility]  [address] [address]-
9898    Anxiety disorder, unspecified F41.9

 

 [facility]  [id number] [address]-
2289    Vaginal yeast infection B37.3

 

 [facility]  [id number] [address]-
7118    Anxiety disorder, unspecified F41.9

 

 [facility]  [address] [address]-
5982    Anxiety disorder, unspecified F41.9

 

 [facility]  [address] [address]-
8753  06 May, 2016  Anxiety disorder, unspecified F41.9

 

 [facility]  [address] [address]-
9506  04 May, 2016  Diabetes E11.9

 

 [facility]  [id number] [address]-
6217    Anxiety disorder, unspecified F41.9

 

 [facility]  [address] [address]-
5650     

 

 [facility]  [id number] [address]-
9949  25 Mar, 2016  Vaginal yeast infection B37.3

 

 [facility]  [id number] [address]-
7972  15 Mar, 2016   

 

 [facility]  [id number] [address]-
6312     

 

 [facility]  [id number] [address]-
0746     

 

 [facility]  [address] [place] [address] 43475-
1464     

 

 [facility]  [id number] [address]-
3671  15 Andrei, 2016   

 

 [facility]  [address] [place] [address] 07632-
7920  22 Dec, 2015   

 

 [facility]  [address] [place] [address] 61553-
2222    Diabetes E11.9 ; Acquired hypothyroidism E03.9 ; 
Hyperlipidemia, unspecified hyperlipidemia E78.5 and Anxiety F41.9

 

 [facility]  [id number] [address]-
4743     

 

 [facility]  [address] [place] [address] 33846-
4541  27 Oct, 2015   

 

 [facility]  [address] [place] [address] 68747-
2412  29 Sep, 2015   

 

 [facility]  [address] [place] [address] 14814-
8485  01 Sep, 2015   

 

 [facility]  [address] [place] [address] 37665-
9030  04 Aug, 2015   

 

 [facility]  [address] [place] [address] 73813-
0918  15 Jul, 2015   

 

 [facility]  [address] [place] [address] 84501-
8556    Pap test, as part of routine gynecological examination 
V76.2 and DUB (dysfunctional uterine bleeding) 626.8

 

 [facility]  [address] [address]-
2634     

 

 [facility]  [address] [place] [address] 81627-
2558     

 

 [facility]  [address] [facility] [id number] [place] [address]-
7895     

 

 [facility]  [address] [id number] [place]
2233     

 

 [facility]  [address] [id number] [place]
7315  15 Eagle, 2015  Diabetes 250.00

 

 [facility]  [address] [id number] [place] [address]-
4062     

 

 [facility]  [address] [id number] [place] [address]-
0845  19 May, 2015   

 

 [facility]  [address] [id number] [place] [address]-
9452  13 May, 2015  Diabetes 250.00

 

 [facility]  [address] [facility] [id number] [place] [address]-
1622  12 May, 2015   

 

 [facility]  [address] [id number] [address]-
7044  07 May, 2015   

 

 [facility]  [address] [id number] [place] [address]-
8295  07 May, 2015   

 

 [facility]  [address] [facility] [id number] [place]-
7747  05 May, 2015   

 

 [facility]  [address] [facility] [id number] [place] [address]-
2451  01 May, 2015   

 

 [facility]  [address] [id number] [address]-
7496     

 

 [facility]  [address] [id number] [place] [address]-
3932     

 

 [facility]  [address] [id number] [place] [address]-
2280     

 

 [facility]  [address] [id number] [place] [address]-
9646  17 Mar, 2015   

 

 [facility]  [address] [id number] [address]-
9019  17 Mar,    

 

 [facility] [place] [facility]  [address] [id number] [place] [address]-
0450  17 Mar,    

 

 [facility] [place] [facility]  [address] [id number] [place] [address]-
5627  17 Mar,    

 

 [facility] [place] [facility]  [address] [facility] [id number] [place] [address]-
6982  09 Mar,    

 

 [facility] [place] [facility]  [address] [id number] [place] [address]-
7734  09 Mar,    

 

 [facility] [place] [facility]  [address] [id number] [place] [address]-
8514  06 Mar, 2015   

 

 [facility] [place] [facility]  [address] [facility] [id number] [place] [address]-
9839  03 Mar,    

 

 [facility] [place] [facility]  [address] [facility] [id number] [place] [address]-
1475  03 Mar, 2015   

 

 [facility] [place] [facility]  [address] [facility] [id number] [place] [address]-
3259  ,    

 

 [facility] [place] [facility]  [address] [facility] [id number] [place] [address]-
0276  ,    

 

 [facility] [place] [facility]  [address] [facility] [id number] [address]-
8723  ,    

 

 [facility] [place] [facility]  [address] [facility] [id number] [place] [address]-
7342  ,    

 

 [facility] [place] [facility]  [address] [facility] [id number] [address]-
5931  ,    

 

 [facility] [place] [facility]  [address] [facility] [id number] [place] [address]-
6214  ,    

 

 [facility] [place] [facility]  [address] [facility] [id number] [address]-
4602  ,    

 

 [facility] [place] [facility]  [address] [facility] [id number] [address]-
5419     

 

 [facility] [place] [facility]  [address] [id number] [place] [address]-
6928     

 

 [facility] [place] [facility]  [address] [id number] [place] [address]-
6953     

 

 [facility] [place] [facility]  [address] [id number] [place] [address]-
2033     

 

 [facility] [place] [facility]  [address] [id number] [place] [address]-
9622     

 

 [facility] [place] [facility]  [address] [id number] [place] [address]-
0461     

 

 [facility] [place] [facility]  [address] [id number] [place] [address]-
1901     

 

 [facility] [place] [facility]  [address] [id number] [place] [address]-
6250     

 

 [facility] [place] [facility]  [address] [id number] [place] [address]-
9466     

 

 [facility] [place] [facility]  [address] [id number] [place] [address]-
9407     

 

 [facility] [place] [facility]  [address] [id number] [place] [address]-
9163     

 

 [facility] [place] [facility]  [address] [id number] [place] [address]-
0793     

 

 [facility] [place] [facility]  [address] [id number] [place] [address]-
8795     

 

 [facility] [place] [facility]  [address] [id number] [place] [address]-
1078  23 Dec, 2014   

 

 [facility] [place] [facility]  [address] [id number] [place] [address]-
2656  23 Dec, 2014   

 

 [facility] [place] [facility]  [address] [id number] [place] [address]-
6042  22 Dec, 2014   

 

 [facility] [place] [facility]  [address] [id number] [place] [address]-
8146  22 Dec, 2014   

 

 [facility] [place] [facility]  [address] [id number] [place] [address]-
1494  17 Dec, 2014   

 

 [facility] [place] [facility]  [address] [id number] [place]
8926  17 Dec, 2014   

 

 [facility] [place] [facility]  [address] [id number] [place] [address]-
3680  15 Dec, 2014   

 

 [facility] [place] [facility]  [address] [id number] [place] [address]-
2783  15 Dec, 2014   

 

 [facility] [place] [facility]  [address] [id number] [place] [address]-
6752  12 Dec, 2014   

 

 [facility] [place] [facility]  [address] [id number] [place] [address]-
9781  08 Dec, 2014   

 

 [facility] [place] [facility]  [address] [id number] [place] [address]-
4020  08 Dec, 2014   

 

 [facility] [place] [facility]  [address] [id number] [place] [address]-
4046  08 Dec, 2014   

 

 [facility] [place] [facility]  [address] [id number] [place] [address]-
6673  08 Dec, 2014   

 

 [facility] [place] [facility]  [address] [id number] [place] [address]-
8848     

 

 [facility] [place] [facility]  [address] [id number] [place] [address]-
1533     

 

 [facility] [place] [facility]  [address] [id number] [place] [address]-
5060     

 

 [facility] [place] [facility]  [address] [id number] [place] [address]-
8455     

 

 [facility] [place] [facility]  [address] [id number] [place] [address]-
5437  10 Nov, 2014   

 

 [facility] [place] [facility]  [address] [id number] [place] [address]-
6896  10 Nov, 2014   

 

 [facility] [place] [facility]  [address] [id number] [place] [address]-
3282  07 Oct, 2014   

 

 [facility] [place] [facility]  [address] [id number] [place] [address]-
4709  07 Oct, 2014   

 

 [facility] [place] [facility]  [address] [id number] [place] [address]-
9596  01 Oct, 2014   

 

 [facility] [place] [facility]  [address] [id number] [place] [address]-
8141  01 Oct, 2014   

 

 [facility] [place] [facility]  [address] [id number] [place] [address]-
3381  24 Sep, 2014   

 

 [facility] [place] [facility]  [address] [id number] [place] [address]-
9123  24 Sep, 2014   

 

 [facility] [place] [facility]  [address] [id number] [place] [address]-
7408  23 Sep, 2014   

 

 [facility] [place] [facility]  [address] [id number] [place] [address]-
6219  23 Sep, 2014   

 

 [facility] [place] [facility]  [address] [id number] [place] [address]-
5476  22 Sep, 2014   

 

 [facility] [place] [facility]  [address] [id number] [place] [address]-
1943  22 Sep, 2014   

 

 [facility] [place] [facility]  [address] [id number] [place] [address]-
3963  19 Aug, 2014   

 

 [facility] [place] [facility]  [address] [id number] [place] [address]-
3951  19 Aug, 2014   

 

 [facility] [place] [facility]  [address] [id number] [place] [address]-
8902     

 

 [facility] [place] [facility]  [address] [id number] [place] [address]-
1341     

 

 [facility] [place] [facility]  [address] [id number] [place] [address]-
0306  10 Eagle, 2014   

 

 [facility] [place] [facility]  [address] [id number] [place] [address]-
5926     

 

 [facility] [place] [facility]  [address] [id number] [place] [address]-
6928     

 

 [facility] [place] [facility]  [address] [id number] [place] [address]-
4534  07 May, 2014   

 

 [facility] [place] [facility]  [address] [id number] [place]
1930  07 May, 2014   

 

 [facility] [place] [facility]  [address] [id number] [place] [address]-
3398     

 

 [facility] [place] [facility]  [address] [id number] [place] [address]-
6544     

 

 [facility] [place] [facility]  [address] [id number] [place] [address]-
4431     

 

 [facility] [place] [facility]  [address] [id number] [place] [address]-
5927     

 

 [facility] [place] [facility]  [address] [id number] [place] [address]-
2614     

 

 [facility] [place] [facility]  [address] [id number] [place] [address]-
4444     

 

 [facility] [place] [facility]  [address] [id number] [place] [address]-
1455     

 

 [facility] [place] [facility]  [address] [id number] [place] [address]-
9514     

 

 [facility] [place] [facility]  [address] [id number] [address]-
2288     

 

 [facility] [place] [facility]  [address] [id number] [place] [address]-
1096     

 

 [facility] [place] [facility]  [address] [id number] [place] [address]-
9747  17 Mar, 2014   

 

 [facility] [place] [facility]  [address] [id number] [place] [address]-
5917  17 Mar, 2014   

 

 [facility] [place] [facility]  [address] [id number] [place] [address]-
8681     

 

 [facility]  [address] [id number] [place] [address]-
7743     

 

 [facility] [place] [facility]  [address] [id number] [place] [address]-
3601     

 

 [facility] [place] [facility]  [address] [id number] [place] [address]-
8228     

 

 [facility] [place] [facility]  [address] [id number] [place] [address]-
5453  25 Oct, 2013   

 

 [facility] [place] [facility]  [address] [id number] [place] [address]-
6332  25 Oct, 2013   

 

 [facility] [place] [facility]  [address] [id number] [place] [address]-
5579  23 Sep, 2013   

 

 [facility] [place] [facility]  [address] [id number] [place] [address]-
1699  06 Aug, 2013   

 

 [facility] [place] [facility]  [address] [id number] [place] [address]-
6175  05 Aug, 2013   

 

 [facility] [place] [facility]  [address] [id number] [place] [address]-
8287     

 

 [facility] [place] [facility]  [address] [facility] [id number] [place] [address]-
7606     

 

 [facility] [place] [facility]  [address] [id number] [place] [address]-
8106     

 

 [facility] [place] [facility]  [address] [id number] [place] [address]-
0414  28 Dec, 2012   

 

 [facility] [place] [facility]  [address] [id number] [place] [address]-
5291  19 Dec, 2012   

 

 [facility] [place] [facility]  [address] [id number] [place] [address]-
6950  19 Dec, 2012   

 

 [facility] [place] [facility]  [address] [id number] [place] [address]-
6376  13 Dec, 2012   

 

 [facility] [place] [facility]  [address] [id number] [place] [address]-
6157  13 Dec, 2012   

 

 [facility] [place] [facility]  [address] [id number] [place] [address]-
4206     

 

 [facility] [place] [facility]  [address] [id number] [place] [address]-
1173     

 

 [facility] [place] [facility]  [address] [id number] [place] [address]-
0428  19 Oct, 2012   

 

 [facility] [place] [facility]  [address] [id number] [place] [address]-
7808  18 Oct, 2012   

 

 [facility] [place] [facility]  [address] [id number] [place] [address]-
3159  17 Oct, 2012   

 

 [facility] [place] [facility]  [address] [id number] [place] [address]-
9694  17 Oct, 2012   

 

 [facility] [place] [facility]  [address] [id number] [place] [address]-
4106  16 Oct, 2012   

 

 [facility] [place] [facility]  [address] [id number] [place] [address]-
3406  16 Oct, 2012   

 

 [facility] [place] [facility]  [address] [id number] [place] [address]-
2492  15 Oct, 2012   

 

 [facility] [place] [facility]  [address] [id number] [place] [address]-
6485  27 Sep, 2012   

 

 [facility] [place] [facility]  [address] [id number] [address]
68[address]  26 Sep, 2012   

 

 [facility] [place] [facility]  [address] [id number] [place] [address]-
9202     

 

 [facility] [place] [facility]  [address] [id number] [place] [address]-
9269     

 

 [facility] [place] [facility]  [address] [id number] [place] [address]-
3268     

 

 [facility] [place] [facility]  [address] [id number] [place] [address]-
9098     

 

 [facility]  [address] [id number] [place] [address]-
0973     

 

 [facility] [place] [facility]  [address] [id number] [place] [address]-
0416     

 

 [facility] [place] [facility]  [address] [id number] [place] [address]-
6866     

 

 [facility] [place] [facility]  [address] [id number] [place] [address]-
0855     

 

 [facility] [place] [facility]  [address] [id number] [place] [address]-
9256  01 Mar, 2012   

 

 [facility] [place] [facility]  [address] [id number] [place] [address]-
0496     

 

 [facility]  [address] [id number] [place] [address]-
5123     

 

 [facility] [place] [facility]  [address] [id number] [place] [address]-
4435     

 

 [facility] [place] [facility]  [address] [id number] [place] [address]-
9829  10 Andrei, 2012   

 

 [facility] [place] [facility]  [address] [id number] [place] [address]-
7050     

 

 [facility] [place] [facility]  [address] [id number] [place] [address]-
6118  20 Dec, 2011   

 

 [facility] [place] [facility]  [address] [id number] [place] [address]-
1495  20 Dec, 2011   

 

 [facility] [place] [facility]  [address] [id number] [place] [address]-
7292  13 Dec, 2011   

 

 [facility] [place] [facility]  [address] [id number] [place] [address]-
8945  08 Dec, 2011   

 

 [facility] [place] [facility]  [address] [id number] [place] [address]-
3828  08 Dec, 2011   

 

 [facility] [place] [facility]  [address] [id number] [address]
9906  06 Dec, 2011   

 

 [facility]  [id number] [address]
0836  10 Oct, 2011   

 

 [facility]  [address] [facility] [id number]
9742  10 Oct, 2011   

 

 [facility]  [address] [facility] [id number] [address]-
9766     

 

 [facility]  [address] [facility] [id number] [address]
4037  20 Dec, 2010   

 

 [facility]  [id number] [address]
7567  20 Dec, 2010   

 

 [facility]  [id number] [address]
8820     

 

 [facility]  [id number] [address]-
9083     

 

 [facility]  [id number] [address]
0825  15 Oct, 2009   

 

 [facility]  [id number] [address]
2980  15 Oct, 2009   

 

 [facility]  [address] [id number] [address]-
6658     







IMMUNIZATIONS

No Known Immunizations



SOCIAL HISTORY

Never Assessed



REASON FOR VISIT

Controlled Med Refill 18



PLAN OF CARE





VITAL SIGNS





MEDICATIONS







 Medication  Instructions  Dosage  Frequency  Start Date  End Date  Duration  
Status

 

 Xanax 1 MG  Orally Once a day  1 tablet  24h       28 days  Active







RESULTS

No Results



PROCEDURES

No Known procedures



INSTRUCTIONS





MEDICATIONS ADMINISTERED

No Known Medications



MEDICAL (GENERAL) HISTORY







 Type  Description  Date

 

 Medical History  hypothyroidism   

 

 Medical History  type II diabetes   

 

 Medical History  back pain   

 

 Medical History  hyperlipidemia   

 

 Medical History  anxiety   

 

 Medical History  mood disorder   

 

 Medical History  heart murmur   

 

 Medical History  chronic bronchitis   

 

 Medical History  [place]   

 

 Surgical History  appendectomy   

 

 Surgical History  gallbladder removal   

 

 Hospitalization History  childbirth   

 

 Hospitalization History  pancreatitis  

 

 Hospitalization History  appendectomy   

 

 Hospitalization History  Anaphylaxis to Bactrim

## 2019-01-30 NOTE — XMS REPORT
[facility]

 Created on: 2017



Libra Galvez

External Reference #: [id number]

: 1965

Sex: Female



Demographics







 Address  [address]  [address]

 

 Preferred Language  Unknown

 

 Marital Status  Unknown

 

 Islam Affiliation  Unknown

 

 Race  Unknown

 

 Ethnic Group  Unknown





Author







 Author  ANIKET MAYFIELD

 

 [facility]

 

 Address  [address] [place]  [address]



 

 Phone  [phone]







Care Team Providers







 Care Team Member Name  Role  Phone

 

 ANIKET MAYFIELD  Unavailable  [phone]







PROBLEMS







 Type  Condition  ICD9-CM Code  TJW26-PC Code  Onset Dates  Condition Status  
SNOMED Code

 

 Problem  Diabetes     E11.9     Active  089945789

 

 Problem  Anxiety disorder, unspecified     F41.9     Active  821604811







ALLERGIES

Unknown Allergies



SOCIAL HISTORY

No smoking Hx information available



PLAN OF CARE





VITAL SIGNS





MEDICATIONS







 Medication  Instructions  Dosage  Frequency  Start Date  End Date  Duration  
Status

 

 Xanax 1 MG  Orally Once a day  1 tablet  24h       28 days  Active







RESULTS

No Results



PROCEDURES

No Known procedures



IMMUNIZATIONS

No Known Immunizations

## 2019-01-30 NOTE — XMS REPORT
[facility]

 Created on: 2017



Libra Galvez

External Reference #: [id number]

: 1965

Sex: Female



Demographics







 Address  [address]  [address]

 

 Preferred Language  Unknown

 

 Marital Status  Unknown

 

 Mosque Affiliation  Unknown

 

 Race  Unknown

 

 Ethnic Group  Unknown





Author







 Author  ANIKET MAYFIELD

 

 [facility]

 

 Address  [address] [place]  [address]



 

 Phone  [phone]







Care Team Providers







 Care Team Member Name  Role  Phone

 

 ANIKET MAYFIELD  Unavailable  [phone]







PROBLEMS







 Type  Condition  ICD9-CM Code  MGL74-LW Code  Onset Dates  Condition Status  
SNOMED Code

 

 Problem  Anxiety disorder, unspecified     F41.9     Active  386966975







ALLERGIES

Unknown Allergies



SOCIAL HISTORY

No smoking Hx information available



PLAN OF CARE





VITAL SIGNS





MEDICATIONS

Unknown Medications



RESULTS

No Results



PROCEDURES

No Known procedures



IMMUNIZATIONS

No Known Immunizations

## 2019-01-30 NOTE — XMS REPORT
[facility]

 Created on: 2016



Libra Galvez

External Reference #: [id number]

: 1965

Sex: Female



Demographics







 Address  [address]  [address]

 

 Home Phone  [phone]

 

 Preferred Language  Unknown

 

 Marital Status  Unknown

 

 Catholic Affiliation  Unknown

 

 Race  White

 

 Ethnic Group  Not  or 





Author







 Author  ANIKET MAYFIELD

 

 Organization  [employer]

 

 Address  Unknown

 

 Phone  Unavailable







Care Team Providers







 Care Team Member Name  Role  Phone

 

 ANIKET MAYFIELD  CP  Unavailable



                                                                



Allergies

          No Known Allergies                                                   
                                     



Problems

          





 Problem Type  Condition  Code  Onset Dates  Condition Status

 

 Problem  Anxiety state, unspecified  300.00     Active

 

 Problem  Pure hyperglyceridemia  272.1     Active

 

 Problem  Diabetes  250.00     Active

 

 Problem  Dysuria  788.1     Active

 

 Problem  Anxiety disorder, unspecified  F41.9     Active

 

 Problem  Other chronic nonalcoholic liver disease  571.8     Active

 

 Problem  Unspecified episodic mood disorder  296.90     Active

 

 Problem  Other bursitis disorders  727.3     Active

 

 Problem  Lumbago  724.2     Active



                                                                               
                                                                               
          



Medications

          No Known Medications                                                 
                             



Results

          No Known Results                                                     
               



Summary Purpose

          [employer] Submission

## 2019-01-30 NOTE — XMS REPORT
[facility]

 Created on: 10/01/2017



Libra Galvez

External Reference #: [id number]

: 1965

Sex: Female



Demographics







 Address  [address]  [address]

 

 Preferred Language  Unknown

 

 Marital Status  Unknown

 

 Catholic Affiliation  Unknown

 

 Race  Unknown

 

 Ethnic Group  Unknown





Author







 Author  ANIKET MAYFIELD

 

 Organization  [facility]

 

 Address  [address] [place]  [address]



 

 Phone  [phone]







Care Team Providers







 Care Team Member Name  Role  Phone

 

 ANIKET MAYFIELD  Unavailable  [phone]







PROBLEMS







 Type  Condition  ICD9-CM Code  JZU91-QJ Code  Onset Dates  Condition Status  
SNOMED Code

 

 Problem  Acquired hypothyroidism     E03.9     Active  426558942

 

 Problem  Diabetes     E11.9     Active  009731043

 

 Problem  Anxiety disorder, unspecified     F41.9     Active  887617751







ALLERGIES

No Information



SOCIAL HISTORY

Never Assessed



PLAN OF CARE





VITAL SIGNS





MEDICATIONS







 Medication  Instructions  Dosage  Frequency  Start Date  End Date  Duration  
Status

 

 Xanax 1 MG  Orally Once a day  1 tablet  24h       28 days  Active







RESULTS

No Results



PROCEDURES

No Known procedures



IMMUNIZATIONS

No Known Immunizations



MEDICAL (GENERAL) HISTORY







 Type  Description  Date

 

 Medical History  hypothyroidism   

 

 Medical History  type II diabetes   

 

 Medical History  back pain   

 

 Medical History  hyperlipidemia   

 

 Medical History  anxiety   

 

 Medical History  mood disorder   

 

 Medical History  heart murmur   

 

 Medical History  chronic bronchitis   

 

 Medical History  [place]   

 

 Surgical History  appendectomy   

 

 Hospitalization History  childbirth   

 

 Hospitalization History  pancreatitis  2005

 

 Hospitalization History  appendectomy   

 

 Hospitalization History  Anaphylaxis to Bactrim  2016

## 2019-01-30 NOTE — XMS REPORT
[facility]

 Created on: 2016



Libra Galvez

External Reference #: [id number]

: 1965

Sex: Female



Demographics







 Address  [address]  [address]

 

 Home Phone  [phone]

 

 Preferred Language  Unknown

 

 Marital Status  Unknown

 

 Sabianism Affiliation  Unknown

 

 Race  White

 

 Ethnic Group  Not  or 





Author







 Author  ANIKTE MAYFIELD

 

 Organization  [employer]

 

 Address  Unknown

 

 Phone  Unavailable







Care Team Providers







 Care Team Member Name  Role  Phone

 

 ANIKET MAYFIELD  CP  Unavailable



                                                                



Allergies

          No Known Allergies                                                   
                                     



Problems

          





 Problem Type  Condition  Code  Onset Dates  Condition Status

 

 Assessment  Anxiety disorder, unspecified  F41.9     Active

 

 Problem  Anxiety state, unspecified  300.00     Active

 

 Problem  Pure hyperglyceridemia  272.1     Active

 

 Problem  Diabetes  250.00     Active

 

 Problem  Dysuria  788.1     Active

 

 Problem  Anxiety disorder, unspecified  F41.9     Active

 

 Problem  Other chronic nonalcoholic liver disease  571.8     Active

 

 Problem  Unspecified episodic mood disorder  296.90     Active

 

 Problem  Other bursitis disorders  727.3     Active

 

 Problem  Lumbago  724.2     Active



                                                                               
                                                                               
                    



Medications

          





 Medication  Code System  Code  Instructions  Start Date  End Date  Status  
Dosage

 

 Xanax  NDC  [address]-44  1 MG Orally Once a day  2016        1 
tablet



                                                                              



Results

          No Known Results                                                     
               



Summary Purpose

          [employer] Submission

## 2019-01-30 NOTE — XMS REPORT
[facility]

 Created on: 2017



Libra Galvez

External Reference #: [id number]

: 1965

Sex: Female



Demographics







 Address  [address]  [address]

 

 Preferred Language  Unknown

 

 Marital Status  Unknown

 

 Orthodox Affiliation  Unknown

 

 Race  Unknown

 

 Ethnic Group  Unknown





Author







 Author  ANIKET MAYFIELD

 

 Organization  [facility]

 

 Address  [address] [place]  [address]



 

 Phone  [phone]







Care Team Providers







 Care Team Member Name  Role  Phone

 

 ANIKET MAYFIELD  Unavailable  [phone]







PROBLEMS







 Type  Condition  ICD9-CM Code  BSF49-GG Code  Onset Dates  Condition Status  
SNOMED Code

 

 Problem  Acquired hypothyroidism     E03.9     Active  722918490

 

 Problem  Diabetes     E11.9     Active  955838613

 

 Problem  Anxiety disorder, unspecified     F41.9     Active  944503546







ALLERGIES

No Information



SOCIAL HISTORY

Never Assessed



PLAN OF CARE





VITAL SIGNS





MEDICATIONS







 Medication  Instructions  Dosage  Frequency  Start Date  End Date  Duration  
Status

 

 Xanax 1 MG  Orally Once a day  1 tablet  24h       28 days  Active







RESULTS

No Results



PROCEDURES

No Known procedures



IMMUNIZATIONS

No Known Immunizations



MEDICAL (GENERAL) HISTORY







 Type  Description  Date

 

 Medical History  hypothyroidism   

 

 Medical History  type II diabetes   

 

 Medical History  back pain   

 

 Medical History  hyperlipidemia   

 

 Medical History  anxiety   

 

 Medical History  mood disorder   

 

 Medical History  heart murmur   

 

 Medical History  chronic bronchitis   

 

 Medical History  [place]   

 

 Surgical History  appendectomy   

 

 Hospitalization History  childbirth   

 

 Hospitalization History  pancreatitis  2005

 

 Hospitalization History  appendectomy   

 

 Hospitalization History  Anaphylaxis to Bactrim  2016

## 2019-01-30 NOTE — XMS REPORT
[facility]

 Created on: 10/11/2016



Libra Galvez

External Reference #: [id number]

: 1965

Sex: Female



Demographics







 Address  [address]  [address]

 

 Home Phone  [phone]

 

 Preferred Language  Unknown

 

 Marital Status  Unknown

 

 Rastafarian Affiliation  Unknown

 

 Race  White

 

 Ethnic Group  Not  or 





Author







 Author  ANIKET MAYFIELD

 

 [facility]  [employer]

 

 Address  Unknown

 

 Phone  Unavailable







Care Team Providers







 Care Team Member Name  Role  Phone

 

 ANIKET MAYFIELD  CP  Unavailable



                                                                



Allergies, Adverse Reactions, Alerts

          





 Substance  Reaction  Event Type

 

 Sulfamethoxazole-Trimethoprim  anaphylaxis  Drug Allergy



                                                                               
         



Problems

          





 Problem Type  Condition  Code  Onset Dates  Condition Status

 

 Assessment  Pelvic pain  R10.2     Active

 

 Problem  Anxiety state, unspecified  300.00     Active

 

 Problem  Pure hyperglyceridemia  272.1     Active

 

 Problem  Diabetes  250.00     Active

 

 Problem  Dysuria  788.1     Active

 

 Problem  Anxiety disorder, unspecified  F41.9     Active

 

 Problem  Other chronic nonalcoholic liver disease  571.8     Active

 

 Problem  Unspecified episodic mood disorder  296.90     Active

 

 Problem  Other bursitis disorders  727.3     Active

 

 Problem  Lumbago  724.2     Active

 

 Assessment  History of pancreatitis  Z87.19     Active

 

 Assessment  Constipation, unspecified constipation type  K59.00     Active

 

 Assessment  Leukocytosis, unspecified  D72.829     Active



                                                                               
                                                                               
                                                  



Medications

          





 Medication  Code System  Code  Instructions  Start Date  End Date  Status  
Dosage

 

 Xanax  NDC  [address]-89  1 MG Orally Once a day  2016        1 
tablet

 

 Potassium  NDC  [address]-19  99 MG Orally Once a day           1 tablet

 

 Amaryl  NDC  [address]-52  2 MG Orally 2 times a day           2 tablets with 
meals

 

 MetFORMIN HCl ER  NDC  23173699945  500 MG Orally 2 times a day           2 
tablet with morning and evening meal



                                                                               
                                       



Procedures

          





 Procedure  Coding System  Code  Date

 

 URINALYSIS, AUTO, W/O SCOPE  CPT-4  42982  2016

 

 COMPLETE CBC W/AUTO DIFF WBC  CPT-4  31068  2016

 

 X-RAY EXAM OF ABDOMEN  CPT-4  95084  2016

 

 Office Visit, Est Pt., Level 3  CPT-4  40664  2016

 

 ASSAY OF AMYLASE  CPT-4  78841  2016

 

 ASSAY OF LIPASE  CPT-4  97007  2016

 

 VENIPUNCT, ROUTINE*  CPT-4  50107  2016

 

 COMPREHEN METABOLIC PANEL  CPT-4  32142  2016



                                                                               
                                                                               
          



Vital Signs

          





 Date/Time:  2016

 

 Cardiac Monitoring Heart Rate  88 bpm

 

 Weight  143.8 lbs

 

 Height  62 in

 

 BMI  26.30 Index

 

 Blood Pressure Diastolic  74 mmHg

 

 Blood Pressure Systolic  118 mmHg



                                                                    



Results

          





 Name  Result  Date  Reference Range  Unit  Abnormality Flag

 

 LIPASE               

 

 ----Lipase, Serum  47  2016  0-59   U/L   

 

 CBC               

 

 ----Basos  0  49179892     %   

 

 ----MCV  85  33604661  79-97   fL   

 

 ----Hematocrit  35.6  04492898  34.0-46.6   %   

 

 ----Eos  1  64998981     %   

 

 ----MCHC  33.1  79364516  31.5-35.7   g/dL   

 

 ----Monocytes  6  51020131     %   

 

 ----MCH  28.2  92775066  26.6-33.0   pg   

 

 ----Lymphs  11  36798948     %   

 

 ----Eos (Absolute)  0.0  16806124  0.0-0.4   x10E3/uL   

 

 ----WBC  5.3  40751404  3.4-10.8   x10E3/uL   

 

 ----Monocytes(Absolute)  0.3  48135064  0.1-0.9   x10E3/uL   

 

 ----Lymphs (Absolute)  0.6  99729497  0.7-3.1   x10E3/uL  L

 

 ----Hemoglobin  11.8  89760991  11.1-15.9   g/dL   

 

 ----Neutrophils (Absolute)  4.3  75951796  1.4-7.0   x10E3/uL   

 

 ----RBC  4.18  89876201  3.77-5.28   x10E6/uL   

 

 ----Immature Grans (Abs)  0.1  97368466  0.0-0.1   x10E3/uL   

 

 ----Immature Granulocytes  1  22521814     %   

 

 ----Neutrophils  81  32947245     %   

 

 ----Baso (Absolute)  0.0  54501650  0.0-0.2   x10E3/uL   

 

 ----RDW  14.0  97949411  12.3-15.4   %   

 

 ----Platelets  187  51699269  150-379   x10E3/uL   

 

 UA LONG DIP (IN HOUSE)               

 

 ----SVEN  Negative  2016         

 

 ----NIT  Negative  2016         

 

 ----SG  1.010  2016         

 

 ----KET  Negative  2016         

 

 ----GUSTAVO  Negative  2016         

 

 ----GLU  2+  2016         

 

 ----Odor  None  2016         

 

 ----pH  6.5  2016         

 

 ----BLO  Trace-intact  2016         

 

 ----URO  0.2  2016         

 

 ----Protein  Negative  2016         

 

 ----Lot #  [id number]  2016         

 

 ----Exp date  2016         

 

 ----Clarity  Clear  2016         

 

 ----Color  Yellow  2016         

 

 AMYLASE               

 

 ----Amylase, Serum  49  2016     U/L   

 

 CMP               

 

 ----Potassium, Serum  4.4  2016  3.5-5.2   mmol/L   

 

 ----Sodium, Serum  136  2016  134-144   mmol/L   

 

 ----BUN/Creatinine Ratio  21  201623       

 

 ----eGFR If Africn Am  127  22401873      >59   mL/min/1.73   

 

 ----eGFR If NonAfricn Am  110  64762721      >59   mL/min/1.73   

 

 ----Creatinine, Serum  0.53  2016  0.57-1.00   mg/dL  L

 

 ----BUN  11  2016  6-24   mg/dL   

 

 ----Glucose, Serum  351  2016  65-99   mg/dL  H

 

 ----AST (SGOT)  18  2016  0-40   IU/L   

 

 ----Globulin, Total  3.7  2016  1.5-4.5   g/dL   

 

 ----ALT (SGPT)  22  2016  0-32   IU/L   

 

 ----A/G Ratio  1.1  2016  1.1-2.5       

 

 ----Bilirubin, Total  0.3  2016  0.0-1.2   mg/dL   

 

 ----Alkaline Phosphatase, S  119  2016     IU/L  H

 

 ----Carbon Dioxide, Total  23  2016  18-29   mmol/L   

 

 ----Calcium, Serum  9.2  2016  8.7-10.2   mg/dL   

 

 ----Protein, Total, Serum  7.8  2016  6.0-8.5   g/dL   

 

 ----Albumin, Serum  4.1  2016  3.5-5.5   g/dL   

 

 ----Chloride, Serum  94  41467766     mmol/L  L

 

 Xray : MERY (IN HOUSE)               



                                                                               
                                       



Summary Purpose

          [employer] Submission

## 2019-01-30 NOTE — XMS REPORT
[facility]

 Created on: 2018



Galvez Libra

External Reference #: [id number]

: 1965

Sex: Female



Demographics







 Address  [address]  [address]

 

 Preferred Language  Unknown

 

 Marital Status  Unknown

 

 [employer] Affiliation  Unknown

 

 Race  Unknown

 

 Ethnic Group  Unknown





Author







 Author  ANIKET MAYFIELD

 

 [facility]

 

 Address  [address] [place]  [address]



 

 Phone  [phone]







Care Team Providers







 Care Team Member Name  Role  Phone

 

 ANIKET MAYFIELD  Unavailable  [phone]







PROBLEMS







 Type  Condition  ICD9-CM Code  BBW94-NL Code  Onset Dates  Condition Status  
SNOMED Code

 

 Problem  Acquired hypothyroidism     E03.9     Active  789643754

 

 Problem  Diabetes     E11.9     Active  [id number]

 

 Problem  Anxiety disorder, unspecified     F41.9     Active  [id number]







ALLERGIES

No Information



ENCOUNTERS







 Encounter  Location  Date  Diagnosis

 

 [place] [address] [address]-
9608     

 

 [facility]  [address] [address]-
2660  20 Mar, 2018   

 

 [facility]  [address] [address]-
6405     

 

 [facility]  [address] [address]-
1266     

 

 [facility]  [address] [address]-
2126  10 Andrei, 2018  Diabetes E11.9 and Drug-induced acute pancreatitis with 
uninfected necrosis K85.31

 

 [facility]  [address] [place] [id number] [place] [address]-
6342  27 Dec, 2017   

 

 [facility]  [address] [address]-
2201     

 

 [facility]  [address] [address]
-[address]80  10 2017  Crushing injury of right foot, initial encounter S97.81XA

 

 [facility]  [address] [address]-
5428  27 Sep, 2017   

 

 [facility]  [address]65[address] [address]
7133  21 Sep, 2017   

 

 [facility]  [address]65[address] [address]
-2474  19 Sep, 2017  Acute non-recurrent pansinusitis J01.40

 

 [facility]  [address]65[address] [address]-
4175  19 Sep, 2017   

 

 [facility]  [address] [id number] [place] [address] 18483-
1462  04 Aug, 2017   

 

 [facility]  [id number] [place] [address] 92873-
4165     

 

 [facility]  [id number] [place] [address]85-
3130  26 May, 2017   

 

 [facility]  [id number] [place] [address]15-
4764  08 May, 2017  Diabetes E11.9 ; Anxiety disorder, unspecified F41.9 and 
Acquired hypothyroidism E03.9

 

 [facility]  [address]65[address] [address]-
8759  01 May, 2017   

 

 [facility]  [id number] [address]-
6621     

 

 [facility]  [address]65100[place] [address]-
5718     

 

 [facility]  [id number] [address]-
7399  06 Mar, 2017   

 

 [facility]  [address]65100[place] [address]-
5105     

 

 [facility]  [address]65100[place] [address]-
0735     

 

 [facility]  [address]65100[place] [address]-
3269     

 

 [facility]  [address] [id number] [place] [address] 23541-
2527     

 

 [facility]  [address] [place] [id number] [place] [address] 34941-
9054    Acute non-recurrent maxillary sinusitis J01.00

 

 [facility]  [id number] [place] [address] 75697-
1283     

 

 [facility]  [address] [place] [id number] [place] [address] 84800-
0493     

 

 [facility]  [address] [id number] [place] [address] 70666-
8276  12 Dec, 2016   

 

 [facility]  [address] [place] [id number] [place] [address] 08344-
3773  15 Nov, 2016   

 

 [facility]  [address] [id number] [place] [address] 13884-
2749  12 Oct, 2016  Diabetes E11.9

 

 [facility]  [address] [id number] [place] [address] 89369-
6606  28 Sep, 2016  Pelvic pain R10.2 ; Leukocytosis, unspecified D72.829 ; 
Constipation, unspecified constipation type K59.00 and History of pancreatitis 
Z87.19

 

 [facility]  [address] [place] [id number] [place] [address] 13901-
9687  22 Sep, 2016   

 

 [facility]  [address] [place] [id number] [place] [address] 64143-
6187  14 Sep, 2016  Diabetes E11.9

 

 [facility]  [address] [place] [id number] [place] [address] 58973-
6899  13 Sep, 2016   

 

 [facility]  [address] [place] [id number] [place] [address] 57944-
7241  09 Sep, 2016  Vaginal yeast infection B37.3

 

 [facility]  [address] [place] [id number] [place] [address] 23895-
7866  08 Sep, 2016   

 

 [facility]  [address] [place] [id number] [place] [address] 62603-
1214  30 Aug, 2016  Diabetes E11.9

 

 [facility]  [address]65100[place] [address]-
2021  25 Aug, 2016  Anxiety disorder, unspecified F41.9

 

 [facility]  [address] [address]-
0703  17 Aug, 2016  Vaginal yeast infection B37.3

 

 [facility]  [address] [place] [address] 44832-
7191  17 Aug, 2016   

 

 [facility]  [address] [place] [address] 60841-
4540    Anxiety disorder, unspecified F41.9

 

 [facility]  [address] [place] [address] 88753-
1024    Vaginal yeast infection B37.3

 

 [facility]  [address] [place] [address] 51406-
9143    Anxiety disorder, unspecified F41.9

 

 [facility]  [address] [place] [address] 99683-
1342    Anxiety disorder, unspecified F41.9

 

 [facility]  [address] [place] [address] 53973-
1077  06 May, 2016  Anxiety disorder, unspecified F41.9

 

 [facility]  [address]65100[place] [address]-
2183  04 May, 2016  Diabetes E11.9

 

 [facility]  [address] [address]13[address]-
1103    Anxiety disorder, unspecified F41.9

 

 [facility]  [address]65100[place] [address]-
5084     

 

 [facility]  [address]65[address] 658946-
6461  25 Mar, 2016  Vaginal yeast infection B37.3

 

 [facility]  [address] [address]39[address]-
6292  15 Mar, 2016   

 

 [facility]  [address]65[address] [address]-
5974     

 

 [facility]  [address] [place] [id number] [place] [address] 97870-
0848     

 

 [facility]  [address] [place] [id number] [place] [address] 71237-
7146     

 

 [facility]  [id number] [place] [address] 19487-
4714  15 Andrei, 2016   

 

 [facility]  [address] [id number] [place] [address] 87721-
7591  22 Dec, 2015   

 

 [facility]  [id number] [place] [address] 83385-
2508    Diabetes E11.9 ; Acquired hypothyroidism E03.9 ; 
Hyperlipidemia, unspecified hyperlipidemia E78.5 and Anxiety F41.9

 

 [facility]  [address] [place] [id number] [place] [address] 17135-
0011     

 

 [facility]  [id number] [place] [address] 41717-
4228  27 Oct, 2015   

 

 [facility]  [id number] [place] [address] 87126-
4322  29 Sep, 2015   

 

 [facility]  [id number] [place] [address] 95410-
0781  01 Sep, 2015   

 

 [facility]  [address] [id number] [place] [address]-
4824  04 Aug, 2015   

 

 [facility]  [address] [place] [address] 91058-
0910  15 Jul, 2015   

 

 [facility]  [address] [place] [id number] [place] [address] 21515-
5813    Pap test, as part of routine gynecological examination 
V76.2 and DUB (dysfunctional uterine bleeding) 626.8

 

 [facility]  [address] [place] [id number] [place] [address] 89642-
8901     

 

 [facility]  [address] [id number] [place] [address]-
4700     

 

 [facility]  [address] [id number] [place] [address]-
0956     

 

 [facility]  [address] [facility] [id number] [place] [address]-
2716     

 

 [facility]  [address] [id number] [place] [address]-
6718  15 Eagle, 2015  Diabetes 250.00

 

 [facility]  [address] [id number] [place] [address]-
6998     

 

 [facility]  [address] [id number] [place] [address]-
1797  19 May, 2015   

 

 [facility]  [address] [id number] [place] [address]-
1153  13 May, 2015  Diabetes 250.00

 

 [facility]  [address] [id number] [place] [address]-
6748  12 May, 2015   

 

 [facility]  [address] [id number] [place] [address]-
1625  07 May, 2015   

 

 [facility]  [address] [facility] [id number] [place] [address]-
0632  07 May, 2015   

 

 [facility]  [address] [id number] [place] [address]-
6793  05 May, 2015   

 

 [facility]  [address] [id number] [place] [address]-
3826  01 May, 2015   

 

 [facility]  [address] [id number] [place] [address]-
6775     

 

 [facility]  [address] [id number] [place] [address]-
6618     

 

 [facility]  [address] [facility] [id number] [place] [address]-
2045     

 

 [facility] [place] [facility]  [address] [id number] [place] [address]-
1760  17 Mar,    

 

 [facility] [place] [facility]  [address] [id number] [place] [address]-
7947  17 Mar,    

 

 [facility] [place] [facility]  [address] [id number] [place] [address]-
6268  17 Mar,    

 

 [facility] [place] [facility]  [address] [id number] [place] [address]-
0987  17 Mar,    

 

 [facility] [place] [facility]  [address] [id number] [place] [address]-
5970  09 Mar,    

 

 [facility] [place] [facility]  [address] [id number] [place] [address]-
5188  09 Mar,    

 

 [facility] [place] [facility]  [address] [facility] [id number] [place] [address]-
2241  06 Mar,    

 

 [facility] [place] [facility]  [address] [id number] [place] [address]-
7414  03 Mar,    

 

 [facility] [place] [facility]  [address] [id number] [place] [address]-
8921  03 Mar, 2015   

 

 [facility] [place] [facility]  [address] [id number] [place] [address]-
2373  ,    

 

 [facility] [place] [facility]  [address] [facility] [id number] [place] [address]-
1011  ,    

 

 [facility] [place] [facility]  [address] [id number] [place] [address]-
1012  ,    

 

 [facility] [place] [facility]  [address] [id number] [place] [address]-
5545  ,    

 

 [facility] [place] [facility]  [address] [id number] [place] [address]-
8735  ,    

 

 [facility] [place] [facility]  [address] [id number] [place] [address]-
1358  ,    

 

 [facility] [place] [facility]  [address] [id number] [place] [address]-
2798     

 

 [facility] [place] [facility]  [address] [id number] [place] [address]-
1833     

 

 [facility] [place] [facility]  [address] [id number] [place] [address]-
9608     

 

 [facility] [place] [facility]  [address] [id number] [place] [address]-
7532     

 

 [facility] [place] [facility]  [address] [id number] [place] [address]-
1350     

 

 [facility] [place] [facility]  [address] [id number] [place] [address]-
7165     

 

 [facility] [place] [facility]  [address] [id number] [place] [address]-
6463     

 

 [facility] [place] [facility]  [address] [id number] [place] [address]-
0066     

 

 [facility] [place] [facility]  [address] [id number] [place] [address]-
6935     

 

 [facility] [place] [facility]  [address] [id number] [place] [address]-
0155     

 

 [facility] [place] [facility]  [address] [id number] [place] [address]-
6554     

 

 [facility] [place] [facility]  [address] [id number] [place] [address]-
4760     

 

 [facility] [place] [facility]  [address] [id number] [place] [address]-
2426     

 

 [facility] [place] [facility]  [address] [id number] [place] [address]-
4327     

 

 [facility] [place] [facility]  [address] [id number] [place] [address]-
1440  23 Dec, 2014   

 

 [facility] [place] [facility]  [address] [id number] [place] [address]-
6449  23 Dec, 2014   

 

 [facility] [place] [facility]  [address] [id number] [place] [address]-
5907  22 Dec, 2014   

 

 [facility] [place] [facility]  [address] [id number] [place] [address]-
3006  22 Dec, 2014   

 

 [facility] [place] [facility]  [address] [id number] [place] [address]-
4386  17 Dec, 2014   

 

 [facility] [place] [facility]  [address] [id number] [place] [address]-
0416  17 Dec, 2014   

 

 [facility] [place] [facility]  [address] [id number] [place] [address]-
1209  15 Dec, 2014   

 

 [facility] [place] [facility]  [address] [id number] [place]
5480  15 Dec, 2014   

 

 [facility] [place] [facility]  [address] [id number] [place] [address]-
4558  12 Dec, 2014   

 

 [facility] [place] [facility]  [address] [id number] [place] [address]-
9661  08 Dec, 2014   

 

 [facility] [place] [facility]  [address] [id number] [place] [address]-
5532  08 Dec, 2014   

 

 [facility] [place] [facility]  [address] [id number] [place] [address]-
3595  08 Dec, 2014   

 

 [facility] [place] [facility]  [address] [id number] [place]
6902  08 Dec, 2014   

 

 [facility] [place] [facility]  [address] [id number] [place] [address]-
1944     

 

 [facility] [place] [facility]  [address] [id number] [place] [address]-
9642     

 

 [facility] [place] [facility]  [address] [id number] [place] [address]-
6996     

 

 [facility] [place] [facility]  [address] [id number] [place] [address]-
3146     

 

 [facility] [place] [facility]  [address] [id number] [place] [address]-
7226  10 Nov, 2014   

 

 [facility] [place] [facility]  [address] [id number] [place] [address]-
8520  10 Nov, 2014   

 

 [facility] [place] [facility]  [address] [id number] [place] [address]-
2999  07 Oct, 2014   

 

 [facility] [place] [facility]  [address] [id number] [place] [address]-
4637  07 Oct, 2014   

 

 [facility] [place] [facility]  [address] [id number] [place] [address]-
5035  01 Oct, 2014   

 

 [facility] [place] [facility]  [address] [id number] [place] [address]-
8245  01 Oct, 2014   

 

 [facility] [place] [facility]  [address] [id number] [place] [address]-
2392  24 Sep, 2014   

 

 [facility] [place] [facility]  [address] [id number] [place] [address]-
5589  24 Sep, 2014   

 

 [facility] [place] [facility]  [address] [id number] [place] [address]-
3013  23 Sep, 2014   

 

 [facility] [place] [facility]  [address] [id number] [place] [address]-
8612  23 Sep, 2014   

 

 [facility] [place] [facility]  [address] [id number] [place] [address]-
5331  22 Sep, 2014   

 

 [facility] [place] [facility]  [address] [id number] [place] [address]-
9815  22 Sep, 2014   

 

 [facility] [place] [facility]  [address] [id number] [place] [address]-
7229  19 Aug, 2014   

 

 [facility] [place] [facility]  [address] [id number] [place] [address]-
5877  19 Aug, 2014   

 

 [facility] [place] [facility]  [address] [id number] [place] [address]-
9168     

 

 [facility] [place] [facility]  [address] [id number] [place] [address]-
1943     

 

 [facility] [place] [facility]  [address] [id number] [place] [address]-
7203  10 Eagle, 2014   

 

 [facility] [place] [facility]  [address] [id number] [place] [address]-
7191     

 

 [facility] [place] [facility]  [address] [id number] [place] [address]-
0359     

 

 [facility] [place] [facility]  [address] [id number] [place] [address]-
0994  07 May, 2014   

 

 [facility] [place] [facility]  [address] [id number] [place] [address]-
1292  07 May, 2014   

 

 [facility] [place] [facility]  [address] [id number] [place] [address]-
9302     

 

 [facility] [place] [facility]  [address] [id number] [place] [address]-
0995     

 

 [facility] [place] [facility]  [address] [id number] [place] [address]-
7244     

 

 [facility] [place] [facility]  [address] [id number] [place] [address]-
4119     

 

 [facility] [place] [facility]  [address] [id number] [place] [address]-
7766     

 

 [facility] [place] [facility]  [address] [id number] [place] [address]-
8794     

 

 [facility] [place] [facility]  [address] [id number] [place] [address]-
2208     

 

 [facility] [place] [facility]  [address] [id number] [place] [address]-
1155     

 

 [facility] [place] [facility]  [address] [id number] [place] [address]-
8856     

 

 [facility] [place] [facility]  [address] [id number] [place] [address]-
9888     

 

 [facility] [place] [facility]  [address] [id number] [place] [address]-
7899  17 Mar, 2014   

 

 [facility] [place] [facility]  [address] [id number] [place] [address]-
6156  17 Mar, 2014   

 

 [facility] [place] [facility]  [address] [id number] [place] [address]-
1221     

 

 [facility] [place] [facility]  [address] [id number] [place] [address]-
2546     

 

 [facility] [place] [facility]  [address] [id number] [place] [address]-
4971     

 

 [facility] [place] [facility]  [address] [id number] [place] [address]-
5766     

 

 [facility] [place] [facility]  [address] [id number] [place] [address]-
3096  25 Oct, 2013   

 

 [facility] [place] [facility]  [address] [id number] [place] [address]-
8839  25 Oct, 2013   

 

 [facility] [place] [facility]  [address] [id number] [place] [address]-
6399  23 Sep, 2013   

 

 [facility] [place] [facility]  [address] [id number] [place] [address]-
4757  06 Aug, 2013   

 

 [facility] [place] [facility]  [address] [id number] [place] [address]-
9706  05 Aug, 2013   

 

 [facility] [place] [facility]  [address] [id number] [place] [address]-
2546     

 

 [facility] [place] [facility]  [address] [id number] [place] [address]-
2546     

 

 [facility] [place] [facility]  [address] [id number] [place] [address]-
4613     

 

 [facility] [place] [facility]  [address] [id number] [place] [address]-
9056  28 Dec, 2012   

 

 [facility] [place] [facility]  [address] [id number] [place] [address]-
2546  19 Dec, 2012   

 

 [facility] [place] [facility]  [address] [id number] [address]-
4130  19 Dec, 2012   

 

 [facility] [place] [facility]  [address] [id number] [place] [address]-
1677  13 Dec, 2012   

 

 [facility] [place] [facility]  [address] [id number] [place]
0327  13 Dec, 2012   

 

 [facility] [place] [facility]  [address] [id number] [place] [address]-
3329     

 

 [facility] [place] [facility]  [address] [id number] [place]-
6672     

 

 [facility] [place] [facility]  [address] [id number] [place] [address]-
2644  19 Oct, 2012   

 

 [facility] [place] [facility]  [address] [id number] [place] [address]-
2271  18 Oct, 2012   

 

 [facility] [place] [facility]  [address] [id number] [place] [address]-
0439  17 Oct, 2012   

 

 [facility] [place] [facility]  [address] [id number] [place] [address]-
1763  17 Oct, 2012   

 

 [facility] [place] [facility]  [address] [id number] [place] [address]-
4187  16 Oct, 2012   

 

 [facility] [place] [facility]  [address] [id number] [place] [address]-
4271  16 Oct, 2012   

 

 [facility] [place] [facility]  [address] [id number] [place] [address]-
0908  15 Oct, 2012   

 

 [facility] [place] [facility]  [address] [id number] [place] [address]-
4990  27 Sep, 2012   

 

 [facility] [place] [facility]  [address] [id number] [place] [address]-
7422  26 Sep, 2012   

 

 [facility] [place] [facility]  [address] [id number] [place] [address]-
9558     

 

 [facility] [place] [facility]  [address] [id number] [place] [address]-
3857     

 

 [facility] [place] [facility]  [address] [id number] [place] [address]-
2286     

 

 [facility]  [address] [id number] [place] [address]-
4386     

 

 [facility] [facility]  [address] [id number] [place] [address]
2546     

 

 [facility]  [address] [id number] [place] [address]-
1086     

 

 [facility] [facility]  [address] [id number] [place] [address]-
3386     

 

 [facility]  [address] [id number] [place] [address]-
4811     

 

 [facility]  [address] [id number] [place] [address]-
9196  01 Mar, 2012   

 

 [facility]  [address] [id number] [place] [address]-
2221     

 

 [facility]  [address] [id number] [place] [address]-
5078     

 

 [facility]  [address] [id number] [place] [address]-
8841     

 

 [facility]  [address] [id number] [place] [address]-
6187  10 Andrei, 2012   

 

 [facility]  [address] [id number] [place] [address]-
3406     

 

 [facility]  [address] [id number] [place] [address]-
1078  20 Dec, 2011   

 

 [facility] [facility]  [address] [id number] [place] [address]-
7136  20 Dec, 2011   

 

 [facility] [facility]  [address] [id number] [place] [address]-
2546  13 Dec, 2011   

 

 [facility]  [address] [id number] [place] [address]-
2931  08 Dec, 2011   

 

 [facility]  [id number] [address]
9910  08 Dec, 2011   

 

 [facility]  [address] [facility] [id number] [address]-
[address]  06 Dec, 2011   

 

 [facility]  [id number] [address]  10 Oct, 2011   

 

 [facility]  [address] [facility] [id number] [place] [address]-
5673  10 Oct, 2011   

 

 [facility]  [id number] [address]     

 

 [facility]  [id number] [place] [phone]
[phone]  20 Dec, 2010   

 

 [facility]  [id number] [address]  20 Dec, 2010   

 

 [facility]  [id number] [address]     

 

 [facility]  [id number] [address]     

 

 [facility]  [id number] [address]  15 Oct, 2009   

 

 [facility]  [id number] [address]  15 Oct, 2009   

 

 [facility]  [id number] [address]     







IMMUNIZATIONS

No Known Immunizations



SOCIAL HISTORY

Never Assessed



REASON FOR VISIT

Controlled Med Refill PRN



PLAN OF CARE





VITAL SIGNS





MEDICATIONS







 Medication  Instructions  Dosage  Frequency  Start Date  End Date  Duration  
Status

 

 Xanax 1 MG  Orally Once a day  1 tablet  24h       28 days  Active







RESULTS

No Results



PROCEDURES

No Known procedures



INSTRUCTIONS





MEDICATIONS ADMINISTERED

No Known Medications



MEDICAL (GENERAL) HISTORY







 Type  Description  Date

 

 Medical History  hypothyroidism   

 

 Medical History  type II diabetes   

 

 Medical History  back pain   

 

 Medical History  hyperlipidemia   

 

 Medical History  anxiety   

 

 Medical History  mood disorder   

 

 Medical History  heart murmur   

 

 Medical History  chronic bronchitis   

 

 Medical History  [place]   

 

 Surgical History  appendectomy   

 

 Hospitalization History  childbirth   

 

 Hospitalization History  pancreatitis  2005

 

 Hospitalization History  appendectomy   

 

 Hospitalization History  Anaphylaxis to Bactrim  2016

## 2019-01-30 NOTE — XMS REPORT
[facility]

 Created on: 10/21/2018



Galvez Libra

External Reference #: [id number]

: 1965

Sex: Female



Demographics







 Address  [address]  [address]

 

 Preferred Language  Unknown

 

 Marital Status  Unknown

 

 Rastafarian Affiliation  Unknown

 

 Race  Unknown

 

 Ethnic Group  Unknown





Author







 Author  ANIKET MAYFIELD

 

 Organization  [facility]

 

 Address  [address] [place]  [address]



 

 Phone  [phone]







Care Team Providers







 Care Team Member Name  Role  Phone

 

 ANIKET MAYFIELD  Unavailable  [phone]







PROBLEMS







 Type  Condition  ICD9-CM Code  ESI01-JD Code  Onset Dates  Condition Status  
SNOMED Code

 

 Problem  Anxiety disorder, unspecified     F41.9     Active  018938644

 

 Problem  Violation of controlled substance agreement     Z91.14     Active  
272013422

 

 Problem  Intestinal malabsorption, unspecified     K90.9     Active  08389583

 

 Problem  Acquired hypothyroidism     E03.9     Active  219170303

 

 Problem  Diabetes     E11.9     Active  583134887

 

 Problem  Hypertriglyceridemia     E78.1     Active  735679461

 

 Problem  Status post cholecystectomy     Z90.49     Active  434450334







ALLERGIES







 Substance  Reaction  Event Type  Date  Status

 

 Sulfamethoxazole-Trimethoprim  anaphylaxis  Drug Allergy  01 Oct, 2018  Active







ENCOUNTERS







 Encounter  Location  Date  Diagnosis

 

 [facility]  [address] [place] [address]-
2074  01 Oct, 2018  Diabetes E11.9 ; Hyperglycemia R73.9 ; Anxiety disorder, 
unspecified F41.9 ; Marijuana smoker F12.90 and Acquired hypothyroidism E03.9

 

 [facility]  [address], [place] [address]-
9191  29 Aug, 2018  Yeast infection B37.9

 

 [facility]  [id number] [place] [address]57-
7013  21 Aug, 2018   

 

 [facility]  [id number] [place] [address] 79637-
3124    Intestinal malabsorption, unspecified K90.9 ; Diarrhea, 
unspecified R19.7 ; Diabetes E11.9 and Marijuana smoker F12.90

 

 [facility]  [address] [facility] [id number] 
[id number]    Dental examination Z01.20

 

 [facility]  [id number] [place] [address] 
[id number]  10 2018  Dental examination Z01.20 and Dental caries K02.9

 

 [place] [address] [address]-
[address]  02 2018  Benzodiazepine use agreement exists Z02.89 ; Therapeutic 
drug monitoring Z51.81 and Violation of controlled substance agreement Z91.14

 

 [place] [address] [address]-
8665     

 

 [facility]  [address] [address]-
8824  04 2018  Ketoacidosis E87.2 ; Status post cholecystectomy Z90.49 ; 
Diabetes E11.9 ; Acquired hypothyroidism E03.9 ; Hypertriglyceridemia E78.1 and 
Vaginal yeast infection B37.3

 

 [place] [id number] [place] [address]67-
8431  15 May, 2018   

 

 [facility]  [id number] [place] [address] 65536-
8527     

 

 [facility]  [id number] [place] [address] 27729-
7320  20 Mar, 2018   

 

 [facility]  [id number] [place] [address] 77018-
2936     

 

 [facility]  [id number] [place] [address] 81815-
4053     

 

 [facility]  [id number] [place] [address] 48567-
3631  10 Andrei, 2018  Diabetes E11.9 and Drug-induced acute pancreatitis with 
uninfected necrosis K85.31

 

 [facility]  [address] [address]-
8076  27 Dec, 2017   

 

 [facility]  [id number] [place] [address] 04577-
8734     

 

 [facility]  [id number] [place] [address] 05196
-1294  10 Nov, 2017  Crushing injury of right foot, initial encounter S97.81XA

 

 [facility]  [id number] [place] [address] 89665-
8533  27 Sep, 2017   

 

 [facility]  [id number] [place] [address] 13701-
5353  21 Sep, 2017   

 

 [facility]  [id number] [place] [address] 96543
-4041  19 Sep, 2017  Acute non-recurrent pansinusitis J01.40

 

 [facility]  [id number] [place] [address] 75880-
9188  19 Sep, 2017   

 

 [facility]  [id number] [place] [address] 77062-
6168  04 Aug, 2017   

 

 [facility]  [id number] [place] [address] 66242-
8828     

 

 [facility]  [id number] [place] [address] 88185-
4159  26 May, 2017   

 

 [facility]  [id number] [place] [address] 83001-
4897  08 May, 2017  Diabetes E11.9 ; Anxiety disorder, unspecified F41.9 and 
Acquired hypothyroidism E03.9

 

 [facility]  [id number] [place] [address] 57562-
5659  01 May, 2017   

 

 [facility]  [id number] [place] [address] 10043-
6393     

 

 [facility]  [id number] [place] [address] 82375-
6290     

 

 [facility]  [id number] [place] [address] 23208-
0192  06 Mar, 2017   

 

 [facility]  [id number] [place] [address] 02018-
5858     

 

 [facility]  [id number] [place] [address] 73612-
8982     

 

 [facility]  [address] [place] [address] 56150-
0960     

 

 [facility]  [id number] [place] [address] 81456-
6836     

 

 [facility]  [id number] [place] [address] 32535-
4401    Acute non-recurrent maxillary sinusitis J01.00

 

 [facility]  [id number] [place] [address] 39209-
4492     

 

 [facility]  [id number] [place] [address] 23205-
1796     

 

 [facility]  [id number] [place] [address] 22172-
3937  12 Dec, 2016   

 

 [facility]  [id number] [place] [address] 90029-
2088  15 Nov, 2016   

 

 [facility]  [id number] [place] [address] 78448-
1127  12 Oct, 2016  Diabetes E11.9

 

 [facility]  [id number] [place] [address] 11308-
0083  28 Sep, 2016  Pelvic pain R10.2 ; Leukocytosis, unspecified D72.829 ; 
Constipation, unspecified constipation type K59.00 and History of pancreatitis 
Z87.19

 

 [facility]  [id number] [place] [address] 47636-
1747  22 Sep, 2016   

 

 [facility]  [id number] [place] [address] 89519-
2441  14 Sep, 2016  Diabetes E11.9

 

 [facility]  [id number] [place] [address] 78502-
0190  13 Sep, 2016   

 

 [facility]  [id number] [place] [address] 16157-
1082  09 Sep, 2016  Vaginal yeast infection B37.3

 

 [facility]  [id number] [address]-
8467  08 Sep, 2016   

 

 [facility]  [id number] [place] [address] 65515-
8445  30 Aug, 2016  Diabetes E11.9

 

 [facility]  [id number] [place] [address] 95989-
4809  25 Aug, 2016  Anxiety disorder, unspecified F41.9

 

 [facility]  [id number] [place] [address] 79996-
5959  17 Aug, 2016  Vaginal yeast infection B37.3

 

 [facility]  [address] [place] [address] 88643-
3620  17 Aug, 2016   

 

 [facility]  [id number] [place] [address] 61642-
3976    Anxiety disorder, unspecified F41.9

 

 [facility]  [id number] [place] [address] 91074-
6133    Vaginal yeast infection B37.3

 

 [facility]  [address] [place] [address] 92209-
9781    Anxiety disorder, unspecified F41.9

 

 [facility]  [id number] [place] [address] 49486-
6292    Anxiety disorder, unspecified F41.9

 

 [facility]  [address] [place] [address] 29757-
0984  06 May, 2016  Anxiety disorder, unspecified F41.9

 

 [facility]  [address] [place] [address] 37183-
3143  04 May, 2016  Diabetes E11.9

 

 [facility]  [address] [place] [address] 82028-
4318    Anxiety disorder, unspecified F41.9

 

 [facility]  [address] [place] [address] 34852-
3633     

 

 [facility]  [id number] [place] [address] 87695-
7560  25 Mar, 2016  Vaginal yeast infection B37.3

 

 [facility]  [id number] [place] [address] 56716-
5641  15 Mar, 2016   

 

 [facility]  [id number] [place] [address] 22161-
0830     

 

 [facility]  [id number] [place] [address] 71213-
6203     

 

 [facility]  [id number] [place] [address] 34388-
4161     

 

 [facility]  [id number] [place] [address] 87895-
5420  15 Andrei, 2016   

 

 [facility]  [id number] [place] [address] 43470-
1579  22 Dec, 2015   

 

 [facility]  [id number] [place] [address] 99978-
9963    Diabetes E11.9 ; Acquired hypothyroidism E03.9 ; 
Hyperlipidemia, unspecified hyperlipidemia E78.5 and Anxiety F41.9

 

 [facility]  [id number] [place] [address] 94808-
9194     

 

 [facility]  [id number] [place] [address] 70717-
6014  27 Oct, 2015   

 

 [facility]  [id number] [place] [address] 23866-
7315  29 Sep, 2015   

 

 [facility]  [id number] [place] [address] 16025-
9009  01 Sep, 2015   

 

 [facility]  [id number] [place] [address] 63314-
4943  04 Aug, 2015   

 

 [facility]  [id number] [place] [address] 014444-
7904  15 Jul, 2015   

 

 [facility]  [id number] [place] [address] 73222-
1055    DUB (dysfunctional uterine bleeding) 626.8 and Pap test, as 
part of routine gynecological examination V76.2

 

 [facility]  [address] [address]-
0166     

 

 [facility]  [address] [address]-
0596     

 

 [facility]  [address] [address]-
0166     

 

 [facility]  [address] [address]
2546     

 

 [facility]  [address] [place] [address] 26769-
8796  15 Eagle, 2015  Diabetes 250.00

 

 [facility]  [address] [address]
2546     

 

 [facility]  [address] [address]-
5146  19 May, 2015   

 

 [facility]  [address] [address]-
8106  13 May, 2015  Diabetes 250.00

 

 [facility]  [address] [address]-
7446  12 May, 2015   

 

 [facility]  [address] [address]-
1656  07 May, 2015   

 

 [facility]  [address] [address]-
2546  07 May, 2015   

 

 [facility]  [id number] [address]-
2546  05 May, 2015   

 

 [facility]  [address] [address]-
2546  01 May, 2015   

 

 [facility]  [id number] [address]-
2546     

 

 [facility]  [address] [address]-
0543     

 

 [facility] [place] [facility]  [address] [id number] [place] [address]-
8116  13 2015   

 

 [facility] [place] [facility]  [address] [id number] [place] [address]-
8355  17 Mar, 2015   

 

 [facility] [place] [facility]  [address] [id number] [place] [address]-
3728  17 Mar, 2015   

 

 [facility] [place] [facility]  [address] [id number] [place] [address]-
3076  17 Mar, 2015   

 

 [facility] [place] [facility]  [address] [id number] [place] [address]-
3248  17 Mar, 2015   

 

 [facility] [place] [facility]  [address] [id number] [place] [address]-
2860  09 Mar, 2015   

 

 [facility] [place] [facility]  [address] [facility] [id number] [place] [address]-
3622  09 Mar, 2015   

 

 [facility] [place] [facility]  [address] [id number] [place] [address]-
6214  06 Mar, 2015   

 

 [facility] [place] [facility]  [address] [id number] [place] [address]-
9101  03 Mar, 2015   

 

 [facility] [place] [facility]  [address] [id number] [address]-
6399  03 Mar, 2015   

 

 [facility] [place] [facility]  [address] [id number] [address]-
0680  ,    

 

 [facility] [place] [facility]  [address] [id number] [address]-
8339  ,    

 

 [facility] [place] [facility]  [address] [id number] [place] [address]-
2368  ,    

 

 [facility] [place] [facility]  [address] [id number] [place] [address]-
8801  ,    

 

 [facility] [place] [facility]  [address] [facility] [id number] [address]-
2142  ,    

 

 [facility] [place] [facility]  [address] [id number] [place] [address]-
2546     

 

 [facility] [place] [facility]  [address] [id number] [place] [address]-
0871     

 

 [facility] [place] [facility]  [address] [id number] [place] [address]-
5346     

 

 [facility] [place] [facility]  [address] [id number] [place] [address]-
3817     

 

 [facility] [place] [facility]  [address] [id number] [place] [address]-
8330     

 

 [facility] [place] [facility]  [address] [id number] [place] [address]-
5579     

 

 [facility] [place] [facility]  [address] [id number] [place] [address]-
4208     

 

 [facility] [place] [facility]  [address] [id number] [place] [address]-
1258     

 

 [facility] [place] [facility]  [address] [id number] [place] [address]-
9366     

 

 [facility] [place] [facility]  [address] [id number] [place] [address]-
8990     

 

 [facility] [place] [facility]  [address] [id number] [place] [address]-
2768     

 

 [facility] [place] [facility]  [address] [id number] [place] [address]-
3418     

 

 [facility] [place] [facility]  [address] [id number] [place] [address]-
8240     

 

 [facility] [place] [facility]  [address] [id number] [place] [address]-
1760     

 

 [facility] [place] [facility]  [address] [id number] [place] [address]-
9366     

 

 [facility] [place] [facility]  [address] [id number] [place] [address]-
7102  23 Dec, 2014   

 

 [facility] [place] [facility]  [address] [id number] [place] [address]-
6870  23 Dec, 2014   

 

 [facility] [place] [facility]  [address] [id number] [place] [address]-
1284  22 Dec, 2014   

 

 [facility] [place] [facility]  [address] [id number] [place] [address]-
1096  22 Dec, 2014   

 

 [facility] [place] [facility]  [address] [id number] [place] [address]-
7529  17 Dec, 2014   

 

 [facility] [place] [facility]  [address] [id number] [place] [address]-
0834  17 Dec, 2014   

 

 [facility] [place] [facility]  [address] [id number] [place] [address]-
7530  15 Dec, 2014   

 

 [facility] [place] [facility]  [address] [id number] [place] [address]-
2190  15 Dec, 2014   

 

 [facility] [place] [facility]  [address] [id number] [place] [address]-
8839  12 Dec, 2014   

 

 [facility] [place] [facility]  [address] [id number] [place] [address]-
9921  08 Dec, 2014   

 

 [facility] [place] [facility]  [address] [id number] [place] [address]-
1343  08 Dec, 2014   

 

 [facility] [place] [facility]  [address] [id number] [place] [address]-
9325  08 Dec, 2014   

 

 [facility] [place] [facility]  [address] [id number] [place] [address]-
5704  08 Dec, 2014   

 

 [facility] [place] [facility]  [address] [id number] [place] [address]-
5458     

 

 [facility] [place] [facility]  [address] [id number] [place] [address]-
6058     

 

 [facility] [place] [facility]  [address] [id number] [place] [address]-
7845     

 

 [facility] [place] [facility]  [address] [id number] [place] [address]-
5944     

 

 [facility] [place] [facility]  [address] [id number] [place] [address]-
2172  10 Nov, 2014   

 

 [facility] [place] [facility]  [address] [id number] [place] [address]-
0730  10 Nov, 2014   

 

 [facility] [place] [facility]  [address] [id number] [place] [address]-
7424  07 Oct, 2014   

 

 [facility] [place] [facility]  [address] [id number] [place] [address]-
7941  07 Oct, 2014   

 

 [facility] [place] [facility]  [address] [id number] [place] [address]-
3768  01 Oct, 2014   

 

 [facility] [place] [facility]  [address] [id number] [place] [address]-
9040  01 Oct, 2014   

 

 [facility] [place] [facility]  [address] [id number] [place] [address]-
2293  24 Sep, 2014   

 

 [facility] [place] [facility]  [address] [id number] [place] [address]-
5696  24 Sep, 2014   

 

 [facility] [place] [facility]  [address] [id number] [place] [address]-
6907  23 Sep, 2014   

 

 [facility] [place] [facility]  [address] [id number] [place] [address]-
7651  23 Sep, 2014   

 

 [facility] [place] [facility]  [address] [id number] [place] [address]-
6461  22 Sep, 2014   

 

 [facility] [place] [facility]  [address] [id number] [place] [address]-
6754  22 Sep, 2014   

 

 [facility] [place] [facility]  [address] [id number] [place] [address]-
7286  19 Aug, 2014   

 

 [facility] [place] [facility]  [address] [id number] [place] [address]-
2860  19 Aug, 2014   

 

 [facility] [place] [facility]  [address] [id number] [place] [address]-
5613     

 

 [facility] [place] [facility]  [address] [id number] [place] [address]-
7429     

 

 [facility] [place] [facility]  [address] [id number] [place] [address]-
0883  10 Eagle, 2014   

 

 [facility] [place] [facility]  [address] [id number] [place] [address]-
8186     

 

 [facility] [place] [facility]  [address] [id number] [place] [address]-
2831     

 

 [facility] [place] [facility]  [address] [id number] [place] [address]-
5957  07 May, 2014   

 

 [facility] [place] [facility]  [address] [id number] [place] [address]-
8073  07 May, 2014   

 

 [facility] [place] [facility]  [address] [id number] [place] [address]-
8682     

 

 [facility] [place] [facility]  [address] [id number] [place] [address]-
9496     

 

 [facility] [place] [facility]  [address] [id number] [place] [address]-
3051     

 

 [facility] [place] [facility]  [address] [id number] [place] [address]-
1191     

 

 [facility] [place] [facility]  [address] [id number] [place] [address]-
8266     

 

 [facility] [place] [facility]  [address] [id number] [place] [address]-
5584     

 

 [facility] [place] [facility]  [address] [id number] [place] [address]-
3693     

 

 [facility] [place] [facility]  [address] [id number] [place] [address]-
3860     

 

 [facility] [place] [facility]  [address] [id number] [place] [address]-
6506     

 

 [facility] [place] [facility]  [address] [id number] [place] [address]-
1775     

 

 [facility] [place] [facility]  [address] [id number] [place] [address]-
9655  17 Mar, 2014   

 

 [facility] [place] [facility]  [address] [id number] [place] [address]-
8896  17 Mar, 2014   

 

 [facility] [place] [facility]  [address] [id number] [place] [address]-
8876     

 

 [facility] [place] [facility]  [address] [id number] [place] [address]-
0458     

 

 [facility] [place] [facility]  [address] [id number] [place] [address]-
6667     

 

 [facility] [place] [facility]  [address] [id number] [place] [address]-
3[address]     

 

 [facility] [place] [facility]  [address] [id number] [place] [address]-
8069  25 Oct, 2013   

 

 [facility] [place] [facility]  [address] [id number] [place] [address]-
2303  25 Oct, 2013   

 

 [facility] [place] [facility]  [address] [id number] [place] [address]-
2094  23 Sep, 2013   

 

 [facility] [place] [facility]  [address] [id number] [place] [address]-
6130  06 Aug, 2013   

 

 [facility] [place] [facility]  [address] [id number] [place] [address]-
8575  05 Aug, 2013   

 

 [facility] [place] [facility]  [address] [id number] [place] [address]
2542     

 

 [facility] [place] [facility]  [address] [id number] [place] [address]-
0434     

 

 [facility] [place] [facility]  [address] [id number] [place] [address]-
2783     

 

 [facility] [place] [facility]  [address] [id number] [place] [address]
2547  28 Dec, 2012   

 

 [facility] [place] [facility]  [address] [id number] [place] [address]-
9275  19 Dec, 2012   

 

 [facility] [place] [facility]  [address] [id number] [place] [address]
3294  19 Dec, 2012   

 

 [facility] [place] [facility]  [address] [id number] [place] [address]
4866  13 Dec, 2012   

 

 [facility] [place] [facility]  [address] [id number] [place] [address]-
3058  13 Dec, 2012   

 

 [facility] [place] [facility]  [address] [id number] [place] [address]-
4442     

 

 [facility] [place] [facility]  [address] [id number] [place] [address]-
2380     

 

 [facility] [place] [facility]  [address] [id number] [place] [address]-
0519  19 Oct, 2012   

 

 [facility] [place] [facility]  [address] [id number] [place] [address]-
0977  18 Oct, 2012   

 

 [facility] [place] [facility]  [address] [id number] [place] [address]-
4704  17 Oct, 2012   

 

 [facility] [place] [facility]  [address] [id number] [place] [address]-
2329  17 Oct, 2012   

 

 [facility] [place] [facility]  [address] [facility] [id number] [place] [address]-
0587  16 Oct, 2012   

 

 [facility] [place] [facility]  [address] [id number] [place] [address]-
5439  16 Oct, 2012   

 

 [facility] [place] [facility]  [address] [id number] [place] [address]-
1241  15 Oct, 2012   

 

 [facility] [place] [facility]  [address] [id number] [place] [address]-
0664  27 Sep, 2012   

 

 [facility] [place] [facility]  [address] [id number] [place] [address]-
7496  26 Sep, 2012   

 

 [facility] [place] [facility]  [address] [id number] [place] [address]-
0724     

 

 [facility] [place] [facility]  [address] [id number] [place] [address]-
1207     

 

 [facility] [place] [facility]  [address] [id number] [place] [address]-
7533     

 

 [facility] [place] [facility]  [address] [id number] [place] [address]-
9852     

 

 [facility] [place] [facility]  [address] [id number] [place] [address]-
5113     

 

 [facility] [place] [facility]  [address] [id number] [place] [address]-
2887     

 

 [facility] [place] [facility]  [address] [id number] [place] [address]-
7825     

 

 [facility] [place] [facility]  [address] [id number] [place] [address]-
4112     

 

 [facility] [place] [facility]  [address] [id number] [place] [address]-
9599  01 Mar, 2012   

 

 [facility] [place] [facility]  [address] [id number] [place] [address]-
9819     

 

 [facility] [place] [facility]  [address] [id number] [place] [address]-
0396     

 

 [facility] [place] [facility]  [address] [id number] [place] [address]-
1642     

 

 [facility] [place] [facility]  [address] [id number] [place] [address]-
4999  10 Andrei, 2012   

 

 [facility] [place] [facility]  [address] [id number] [place] [address]-
3134     

 

 [facility] [place] [facility]  [address] [id number] [place] [address]-
1154  20 Dec, 2011   

 

 [facility] [place] [facility]  [address] [id number] [place] [address]-
0301  20 Dec, 2011   

 

 [facility] [place] [facility]  [address], [place] [address]-
6144  13 Dec, 2011   

 

 [facility]  [address], [place] [address]-
0772  08 Dec, 2011   

 

 [facility]  [address], [place] [address]-
6041  08 Dec, 2011   

 

 [facility]  [address] [address]-
3396  06 Dec, 2011   

 

 [facility]  [address] [address]-
7619  10 Oct, 2011   

 

 [facility]  [id number] [place] [address]-
7944  10 Oct, 2011   

 

 [facility]  [address], [place] [address]-
1905     

 

 [facility]  [id number] [place] [address]-
2502  20 Dec, 2010   

 

 [facility]  [address], [place] [address]-
3881  20 Dec, 2010   

 

 [facility]  [address] [address]-
9157     

 

 [facility]  [address] [address]-
6716     

 

 [facility]  [address] [address]-
1765  15 Oct, 2009   

 

 [facility]  [id number] [address]-
3081  15 Oct, 2009   

 

 [facility]  [address], [place] [address]-
3309     







IMMUNIZATIONS

No Known Immunizations



SOCIAL HISTORY

Never Assessed



REASON FOR VISIT

3 mo f/u   --COLELEN Swan



PLAN OF CARE







 Activity  Details









  









 Follow Up  3 Months Reason:DM

 

 Pending Test  TSH 



 



VITAL SIGNS







 Height  62 in  2018-10-01

 

 Weight  140.6 lbs  2018-10-01

 

 Temperature  97.8 degrees Fahrenheit  2018-10-01

 

 Heart Rate  72 bpm  2018-10-01

 

 Respiratory Rate  18   2018-10-01

 

 BMI  25.71 kg/m2  2018-10-01

 

 Blood pressure systolic  104 mmHg  2018-10-01

 

 Blood pressure diastolic  62 mmHg  2018-10-01







MEDICATIONS







 Medication  Instructions  Dosage  Frequency  Start Date  End Date  Duration  
Status

 

 Glucocard Expression Monitor w/Device     as directed     08 May, 2017     50  
Active

 

 MetFORMIN HCl  mg     TAKE TWO TABLETS BY MOUTH TWICE DAILY WITH MEALS.  
MUST BE TEVA BRAND           90  Active

 

 Ibuprofen 800 MG  Orally Once a day  1 tablet  24h           Active

 

 Potassium 99 MG  Orally Once a day  1 tablet  24h           Active

 

 Amaryl 4 MG  Orally once in AM and 1 in PM  TAKE TWO TABLETS BY MOUTH         
  90 days  Active







RESULTS







 Name  Result  Date  Reference Range

 

 A1C (IN HOUSE)     2018-10-01   

 

 A1C IN HOUSE  8.1     4.3 - 5.6 %

 

 Previous A1c  9.4      

 

 Lot   [id number]      

 

 Exp date  2020      







PROCEDURES







 Procedure  Date Ordered  Result  Body Site

 

 GLYCATED HEMOGLOBIN TEST  Oct 01, 2018      

 

 ASSAY THYROID STIM HORMONE  Oct 01, 2018      

 

 VENIPUNCT, ROUTINE*  Oct 01, 2018      







INSTRUCTIONS





MEDICATIONS ADMINISTERED

No Known Medications



MEDICAL (GENERAL) HISTORY







 Type  Description  Date

 

 Medical History  hypothyroidism   

 

 Medical History  type II diabetes   

 

 Medical History  back pain   

 

 Medical History  hyperlipidemia   

 

 Medical History  anxiety   

 

 Medical History  mood disorder   

 

 Medical History  heart murmur   

 

 Medical History  chronic bronchitis   

 

 Medical History  [place]   

 

 Surgical History  appendectomy   

 

 Surgical History  gallbladder removal   

 

 Hospitalization History  childbirth   

 

 Hospitalization History  pancreatitis  2005

 

 Hospitalization History  appendectomy   

 

 Hospitalization History  Anaphylaxis to Bactrim

## 2019-01-30 NOTE — XMS REPORT
[facility]

 Created on: 2018



Libra Galvez

External Reference #: [id number]

: 1965

Sex: Female



Demographics







 Address  [address] [place] [address]  [address]

 

 Preferred Language  Unknown

 

 Marital Status  Unknown

 

 Church Affiliation  Unknown

 

 Race  Unknown

 

 Ethnic Group  Unknown





Author







 Author  ANIKET MAYFIELD

 

 Organization  [facility]

 

 Address  [address] [place]  [address]



 

 Phone  [phone]







Care Team Providers







 Care Team Member Name  Role  Phone

 

 ANIKET MAYFIELD  Unavailable  [phone]







PROBLEMS







 Type  Condition  ICD9-CM Code  HNA73-EJ Code  Onset Dates  Condition Status  
SNOMED Code

 

 Problem  Anxiety disorder, unspecified     F41.9     Active  952172536

 

 Problem  Violation of controlled substance agreement     Z91.14     Active  
[id number]

 

 Problem  Intestinal malabsorption, unspecified     K90.9     Active  55167180

 

 Problem  Acquired hypothyroidism     E03.9     Active  027039237

 

 Problem  Diabetes     E11.9     Active  173571269

 

 Problem  Hypertriglyceridemia     E78.1     Active  192805234

 

 Problem  Status post cholecystectomy     Z90.49     Active  [id number]







ALLERGIES

No Information



ENCOUNTERS







 Encounter  Location  Date  Diagnosis

 

 [facility]  [id number] [place] [address] 40686-
9197    Intestinal malabsorption, unspecified K90.9 ; Diarrhea, 
unspecified R19.7 ; Diabetes E11.9 and Marijuana smoker F12.90

 

 [facility]  [address] [place] [address] 
[id number]    Dental examination Z01.20

 

 [facility]  [id number] [place] [address] 
[id number]  10 Jul, 2018  Dental examination Z01.20 and Dental caries K02.9

 

 [facility]  [address] [place] [address]45-
2698    Benzodiazepine use agreement exists Z02.89 ; Therapeutic 
drug monitoring Z51.81 and Violation of controlled substance agreement Z91.14

 

 [facility]  [address] [place] [address]-
4460     

 

 [facility]  [id number] [place] [address] 80032-
4831    Ketoacidosis E87.2 ; Status post cholecystectomy Z90.49 ; 
Diabetes E11.9 ; Acquired hypothyroidism E03.9 ; Hypertriglyceridemia E78.1 and 
Vaginal yeast infection B37.3

 

 [facility]  [id number] [place] [address] 38199-
8718  15 May, 2018   

 

 [place] [address] [address]-
9168     

 

 [facility]  [address] [address]-
0079  20 Mar, 2018   

 

 [address] [address]-
4850     

 

 [address] [address] [address]-
3972     

 

 [address] [address] [address]-
4908  10 Andrei, 2018  Diabetes E11.9 and Drug-induced acute pancreatitis with 
uninfected necrosis K85.31

 

 [place] [id number] [place] [address] 89163-
0624  27 Dec, 2017   

 

 [address] [address]-
3456     

 

 [facility] [place] [id number] [place] [address] 16540
-[phone]  10 Nov, 2017  Crushing injury of right foot, initial encounter S97.81XA

 

 [place] [id number] [place] [address] 93541-
6191  27 Sep, 2017   

 

 [place] [address] [address]-
7951  21 Sep, 2017   

 

 [facility]  [id number] [place] [address] [phone]
[phone]  19 Sep, 2017  Acute non-recurrent pansinusitis J01.40

 

 [place] [address] [address]24-
3997  19 Sep, 2017   

 

 [facility]  [id number] [place] [address] 01445-
9066  04 Aug, 2017   

 

 [facility]  [id number] [place] [address] 65496-
8772     

 

 [facility]  [id number] [place] [address] 866338-
6444  26 May, 2017   

 

 [facility]  [id number] [place] [address] 160685-
9328  08 May, 2017  Diabetes E11.9 ; Anxiety disorder, unspecified F41.9 and 
Acquired hypothyroidism E03.9

 

 [facility]  [id number] [place] [address] 17952-
3754  01 May, 2017   

 

 [facility]  [id number] [place] [address] 96253-
7351     

 

 [facility]  [id number] [place] [address] 07431-
7968     

 

 [facility]  [id number] [place] [address] 13573-
6199  06 Mar, 2017   

 

 [facility]  [id number] [place] [address] 73043-
5930     

 

 [facility]  [id number] [place] [address] 96060-
2046     

 

 [facility]  [id number] [place] [address] 78763-
3728     

 

 [facility]  [id number] [place] [address] 11934-
9330     

 

 [facility]  [id number] [place] [address] 33705-
8318    Acute non-recurrent maxillary sinusitis J01.00

 

 [facility]  [id number] [place] [address] 54108-
9743     

 

 [facility]  [address] [address]-
6173  09 2017   

 

 [facility]  [id number] [place] [address] 17717-
0117  12 Dec, 2016   

 

 [facility]  [id number] [place] [address] 41906-
7620  15 Nov, 2016   

 

 [facility]  [id number] [place] [address] 65631-
4511  12 Oct, 2016  Diabetes E11.9

 

 [facility]  [id number] [place] [address] 07710-
4808  28 Sep, 2016  Pelvic pain R10.2 ; Leukocytosis, unspecified D72.829 ; 
Constipation, unspecified constipation type K59.00 and History of pancreatitis 
Z87.19

 

 [facility]  [id number] [place] [address] 93232-
6659  22 Sep, 2016   

 

 [facility]  [id number] [place] [address] 87152-
2182  14 Sep, 2016  Diabetes E11.9

 

 [facility]  [id number] [place] [address] 60201-
7579  13 Sep, 2016   

 

 [facility]  [id number] [place] [address] 23154-
4862  09 Sep, 2016  Vaginal yeast infection B37.3

 

 [facility]  [address] [place] [address] 90817-
5305  08 Sep, 2016   

 

 [facility]  [id number] [place] [address] 20977-
0639  30 Aug, 2016  Diabetes E11.9

 

 [facility]  [id number] [place] [address] 71830-
2219  25 Aug, 2016  Anxiety disorder, unspecified F41.9

 

 [facility]  [address] [place] [address] 32891-
7797  17 Aug, 2016  Vaginal yeast infection B37.3

 

 [facility]  [id number] [place] [address] 59622-
5767  17 Aug, 2016   

 

 [facility]  [address] [address]-
5457    Anxiety disorder, unspecified F41.9

 

 [facility]  [address] 927249-
0606    Vaginal yeast infection B37.3

 

 [facility]  [id number] [place] [address] 405669-
6296    Anxiety disorder, unspecified F41.9

 

 [facility]  [address] [address]483-
5120    Anxiety disorder, unspecified F41.9

 

 [facility]  [id number] [place] [address] 477728-
0295  06 May, 2016  Anxiety disorder, unspecified F41.9

 

 [facility]  [id number] [place] [address] 44028-
8033  04 May, 2016  Diabetes E11.9

 

 [facility]  [address] [address]-
7173    Anxiety disorder, unspecified F41.9

 

 [facility]  [address] 519324-
3993     

 

 [facility]  [address] [address]-
3590  25 Mar, 2016  Vaginal yeast infection B37.3

 

 [facility]  [address] [address]-
0978  15 Mar, 2016   

 

 [facility]  [address] 823368-
3931     

 

 [facility]  [address] 426850-
1884     

 

 [facility]  [address] [address]-
4271     

 

 [facility]  [id number] [place] [address] 75191-
1549  15 Andrei, 2016   

 

 [facility]  [id number] [place] [address] 18605-
1616  22 Dec, 2015   

 

 [facility]  [id number] [place] [address] 21540-
5835    Diabetes E11.9 ; Acquired hypothyroidism E03.9 ; 
Hyperlipidemia, unspecified hyperlipidemia E78.5 and Anxiety F41.9

 

 [facility]  [id number] [place] [address] 23153-
0997     

 

 [facility]  [id number] [place] [address] 35418-
6018  27 Oct, 2015   

 

 [facility]  [id number] [place] [address] 92937-
9219  29 Sep, 2015   

 

 [facility]  [id number] [place] [address] 11346-
3110  01 Sep, 2015   

 

 [facility]  [id number] [place] [address] 92062-
4939  04 Aug, 2015   

 

 [facility]  [id number] [place] [address] 47406-
9770  15 Jul, 2015   

 

 [facility]  [id number] [place] [address] 22278-
7259    DUB (dysfunctional uterine bleeding) 626.8 and Pap test, as 
part of routine gynecological examination V76.2

 

 [facility]  [id number] [place] [address] 64310-
3435     

 

 [facility]  [id number] [place] [address] 34610-
3825     

 

 [facility]  [id number] [place] [address] 74525-
7589     

 

 [facility]  [id number] [place] [address] 55000-
7773     

 

 [facility]  [address] [facility] [id number] [place] [address]-
4940  15 Eagle, 2015  Diabetes 250.00

 

 [facility]  [address] [id number] [place] [address]-
6819     

 

 [facility]  [address] [id number] [place] [address]-
7602  19 May, 2015   

 

 [facility]  [address] [id number] [place] [address]-
9515  13 May, 2015  Diabetes 250.00

 

 [facility]  [address] [id number] [place] [address]-
1153  12 May, 2015   

 

 [facility]  [address] [id number] [place] [address]-
5156  07 May, 2015   

 

 [facility]  [address] [id number] [place] [address]-
1378  07 May, 2015   

 

 [facility]  [address] [id number] [place] [address]-
6621  05 May, 2015   

 

 [facility]  [address] [id number] [place] [address]-
3918  01 May, 2015   

 

 [facility]  [address] [id number] [place] [address]-
2347     

 

 [facility]  [address] [id number] [place] [address]-
3319     

 

 [facility]  [address] [id number] [address]-
5877     

 

 [facility]  [address] [id number] [place] [address]-
1776  17 Mar, 2015   

 

 [facility]  [address] [id number] [place] [address]-
3726  17 Mar, 2015   

 

 [facility]  [address] [id number] [place] [address]-
7027  17 Mar, 2015   

 

 [facility]  [address] [id number] [address]-
1811  17 Mar, 2015   

 

 [facility] [place] [facility]  [address] [id number] [place] [address]-
1937  09 Mar,    

 

 [facility] [place] [facility]  [address] [id number] [place] [address]-
5814  09 Mar,    

 

 [facility] [place] [facility]  [address] [facility] [id number] [place] [address]-
4820  06 Mar,    

 

 [facility] [place] [facility]  [address] [id number] [place] [address]-
9903  03 Mar,    

 

 [facility] [place] [facility]  [address] [id number] [place] [address]-
7880  03 Mar, 2015   

 

 [facility] [place] [facility]  [address] [id number] [place] [address]-
1789  ,    

 

 [facility] [place] [facility]  [address] [facility] [id number] [place] [address]-
6459  ,    

 

 [facility] [place] [facility]  [address] [facility] [id number] [place] [address]-
6718  ,    

 

 [facility] [place] [facility]  [address] [facility] [id number] [place] [address]-
7570  ,    

 

 [facility] [place] [facility]  [address] [facility] [id number] [place] [address]-
0067  ,    

 

 [facility] [place] [facility]  [address] [facility] [id number] [place] [address]-
3875  ,    

 

 [facility] [place] [facility]  [address] [facility] [id number] [place] [address]-
6817  ,    

 

 [facility] [place] [facility]  [address] [id number] [place] [address]-
6315  ,    

 

 [facility] [place] [facility]  [address] [facility] [id number] [place] [address]-
4322     

 

 [facility] [place] [facility]  [address] [facility] [id number] [place] [address]-
5973     

 

 [facility] [place] [facility]  [address] [id number] [place] [address]-
6089     

 

 [facility] [place] [facility]  [address] [id number] [place] [address]-
2007     

 

 [facility] [place] [facility]  [address] [id number] [place] [address]-
2443     

 

 [facility] [place] [facility]  [address] [id number] [place] [address]-
0255     

 

 [facility] [place] [facility]  [address] [id number] [place] [address]-
2640     

 

 [facility] [place] [facility]  [address] [id number] [place] [address]-
4144     

 

 [facility] [place] [facility]  [address] [id number] [place] [address]-
5661     

 

 [facility] [place] [facility]  [address] [id number] [place] [address]-
9991     

 

 [facility] [place] [facility]  [address] [id number] [place] [address]-
8610     

 

 [facility] [place] [facility]  [address] [id number] [place] [address]-
8342     

 

 [facility] [place] [facility]  [address] [id number] [place] [address]-
7441  23 Dec, 2014   

 

 [facility] [place] [facility]  [address] [id number] [place] [address]-
3535  23 Dec, 2014   

 

 [facility] [place] [facility]  [address] [id number] [place] [address]-
6302  22 Dec, 2014   

 

 [facility] [place] [facility]  [address] [id number] [place] [address]-
2040  22 Dec, 2014   

 

 [facility] [place] [facility]  [address] [id number] [place] [address]-
8934  17 Dec, 2014   

 

 [facility] [place] [facility]  [address] [id number][place] [address]-
7881  17 Dec, 2014   

 

 [facility] [place] [facility]  [address] [id number] [place] [address]-
9312  15 Dec, 2014   

 

 [facility] [place] [facility]  [address] [id number] [place]-
0002  15 Dec, 2014   

 

 [facility] [place] [facility]  [address] [id number] [place] [address]-
4557  12 Dec, 2014   

 

 [facility] [place] [facility]  [address] [id number] [place] [address]-
4948  08 Dec, 2014   

 

 [facility] [place] [facility]  [address] [id number] [place] [address]-
3901  08 Dec, 2014   

 

 [facility] [place] [facility]  [address] [id number] [place] [address]-
2555  08 Dec, 2014   

 

 [facility] [place] [facility]  [address] [facility] [id number] [place] [address]-
1126  08 Dec, 2014   

 

 [facility] [place] [facility]  [address] [id number] [place] [address]-
9702     

 

 [facility] [place] [facility]  [address] [id number] [place] [address]-
4769     

 

 [facility] [place] [facility]  [address] [id number][place] [address]-
4359     

 

 [facility] [place] [facility]  [address] [id number][place] [address]-
0221     

 

 [facility] [place] [facility]  [address] [id number][place] [address]-
7450  10 Nov, 2014   

 

 [facility] [place] [facility]  [address] [id number][place] [address]-
0432  10 Nov, 2014   

 

 [facility] [place] [facility]  [address] [id number][place] [address]-
1490  07 Oct, 2014   

 

 [facility] [place] [facility]  [address] [id number] [place] [address]-
5915  07 Oct, 2014   

 

 [facility] [place] [facility]  [address] [id number] [place] [address]-
6329  01 Oct, 2014   

 

 [facility] [place] [facility]  [address] [id number] [place] [address]-
3321  01 Oct, 2014   

 

 [facility] [place] [facility]  [address] [id number] [place] [address]-
2712  24 Sep, 2014   

 

 [facility] [place] [facility]  [address] [id number] [place] [address]-
9525  24 Sep, 2014   

 

 [facility] [place] [facility]  [address] [id number] [place] [address]-
5788  23 Sep, 2014   

 

 [facility] [place] [facility]  [address] [id number] [place] [address]-
5443  23 Sep, 2014   

 

 [facility] [place] [facility]  [address] [id number] [place] [address]-
7497  22 Sep, 2014   

 

 [facility] [place] [facility]  [address] [id number] [place] [address]-
1141  22 Sep, 2014   

 

 [facility] [place] [facility]  [address] [id number] [place] [address]-
0652  19 Aug, 2014   

 

 [facility] [place] [facility]  [address] [id number] [place] [address]-
8654  19 Aug, 2014   

 

 [facility] [place] [facility]  [address] [id number] [place] [address]-
2584     

 

 [facility] [place] [facility]  [address] [id number] [place] [address]-
5348     

 

 [facility] [place] [facility]  [address] [id number] [place] [address]-
0449  10 Eagle, 2014   

 

 [facility] [place] [facility]  [address] [id number] [place] [address]-
5868     

 

 [facility] [place] [facility]  [address] [id number] [place] [address]-
1643     

 

 [facility] [place] [facility]  [address] [id number] [place] [address]-
5940  07 May, 2014   

 

 [facility] [place] [facility]  [address] [id number] [place] [address]-
2714  07 May, 2014   

 

 [facility] [place] [facility]  [address] [id number] [place] [address]-
7454     

 

 [facility] [place] [facility]  [address] [id number] [place]
7206     

 

 [facility] [place] [facility]  [address] [id number] [place] [address]-
6360     

 

 [facility] [place] [facility]  [address] [id number] [place] [address]-
5976     

 

 [facility] [place] [facility]  [address] [id number] [place] [address]-
9727     

 

 [facility] [place] [facility]  [address] [id number] [place] [address]-
7397     

 

 [facility] [place] [facility]  [address] [id number] [place] [address]-
1594     

 

 [facility] [place] [facility]  [address] [id number] [place] [address]-
7508     

 

 [facility] [place] [facility]  [address] [id number] [place] [address]-
9826     

 

 [facility] [place] [facility]  [address] [id number] [place] [address]-
7270     

 

 [facility] [place] [facility]  [address] [id number][place] [address]-
2852  17 Mar, 2014   

 

 [facility] [place] [facility]  [address] [id number] [place] [address]-
1638  17 Mar, 2014   

 

 [facility] [place] [facility]  [address] [id number] [place] [address]-
6873     

 

 [facility] [place] [facility]  [address] [id number] [place] [address]-
9617     

 

 [facility] [place] [facility]  [address] [id number] [place] [address]-
3664     

 

 [facility]  [address] [id number] [place] [address]-
0777     

 

 [facility] [place] [facility]  [address] [id number] [place]
1878  25 Oct, 2013   

 

 [facility]  [address] [id number] [place] [address]-
6245  25 Oct, 2013   

 

 [facility] [place] [facility]  [address] [id number] [place] [address]-
3678  23 Sep, 2013   

 

 [facility]  [address] [id number] [place] [address]-
7157  06 Aug, 2013   

 

 [facility] [place] [facility]  [address] [id number] [place] [address]-
0030  05 Aug, 2013   

 

 [facility]  [address] [id number] [place] [address]-
6432     

 

 [facility]  [address] [id number] [place] [address]-
9123     

 

 [facility] [place] [facility]  [address] [id number] [place] [address]-
8752     

 

 [facility]  [address] [id number] [place] [address]-
6110  28 Dec, 2012   

 

 [facility] [place] [facility]  [address] [id number] [place] [address]-
7186  19 Dec, 2012   

 

 [facility] [place] [facility]  [address] [id number] [place] [address]-
5199  19 Dec, 2012   

 

 [facility] [place] [facility]  [address] [id number] [place] [address]-
9799  13 Dec, 2012   

 

 [facility] [place] [facility]  [address] [id number] [place] [address]-
3955  13 Dec, 2012   

 

 [facility] [place] [facility]  [address] [id number] [place] [address]-
2573     

 

 [facility] [place] [facility]  [address] [id number] [place] [address]-
5189     

 

 [facility] [place] [facility]  [address] [id number] [place] [address]-
4601  19 Oct, 2012   

 

 [facility] [place] [facility]  [address] [id number] [place] [address]-
8716  18 Oct, 2012   

 

 [facility] [place] [facility]  [address] [id number] [place] [address]-
1295  17 Oct, 2012   

 

 [facility] [place] [facility]  [address] [id number] [place] [address]-
5599  17 Oct, 2012   

 

 [facility] [place] [facility]  [address] [id number] [place] [address]-
7454  16 Oct, 2012   

 

 [facility] [place] [facility]  [address] [id number] [place] [address]-
3515  16 Oct, 2012   

 

 [facility] [place] [facility]  [address] [id number] [place] [address]-
1806  15 Oct, 2012   

 

 [facility] [place] [facility]  [address] [id number] [place] [address]-
0091  27 Sep, 2012   

 

 [facility] [place] [facility]  [address] [id number] [place] [address]-
5315  26 Sep, 2012   

 

 [facility] [place] [facility]  [address] [id number] [place] [address]-
1553     

 

 [facility] [place] [facility]  [address] [id number][place] [address]-
3150     

 

 [facility] [place] [facility]  [address] [id number] [place] [address]-
5204     

 

 [facility] [place] [facility]  [address] [id number] [place] [address]-
4748     

 

 [facility] [place] [facility]  [address] [id number] [place] [address]-
1183     

 

 [facility] [place] [facility]  [address] [id number], [place] [address]-
9049     

 

 [facility]  [address] [id number] [place] [address]-
9672     

 

 [facility] [place] [facility]  [address] [id number] [place] [address]-
3376     

 

 [facility] [place] [facility]  [address] [id number] [place] [address]
2546  01 Mar, 2012   

 

 [facility] [place] [facility]  [address] [id number] [place] [address]-
5416     

 

 [facility] [place] [facility]  [address] [id number] [place] [address]-
2918     

 

 [facility] [place] [facility]  [address] [id number] [place] [address]-
0506     

 

 [facility]  [address] [id number] [place] [address]-
2688  10 Andrei, 2012   

 

 [facility] [place] [facility]  [address] [id number] [place] [address]-
3824     

 

 [facility]  [address] [id number] [place] [address]-
7221  20 Dec, 2011   

 

 [facility] [place] [facility]  [address] [id number] [place] [address]-
0236  20 Dec, 2011   

 

 [facility] [place] [facility]  [address] [id number], [place] [address]-
4921  13 Dec, 2011   

 

 [facility] [place] [facility]  [address] [id number][place] [address]-
6377  08 Dec, 2011   

 

 [facility] [place] [facility]  [address] [id number] [place] [address]-
1729  08 Dec, 2011   

 

 [facility] [place] [facility]  [address] [id number] [place] [address]-
0296  06 Dec, 2011   

 

 [facility] [place] [facility]  [address] [id number] [place] [address]-
1683  10 Oct, 2011   

 

 [facility] [place] [facility]  [address] [id number], [place] [address]-
5796  10 Oct, 2011   

 

 [facility]  [address] [id number], [place] [address]-
4075     

 

 [facility]  [id number], [place] [address]
8953  20 Dec, 2010   

 

 [facility]  [address] [id number], [place] [address]
0839  20 Dec, 2010   

 

 [facility]  [id number], [place] [address]-
1578     

 

 [facility]  [id number], [place] [address]-
9542     

 

 [facility]  [id number], [place] [address]-
9999  15 Oct, 2009   

 

 [facility]  [id number], [place] [address]-
[address]909  15 Oct, 2009   

 

 [facility]  [address] [id number], [place] [address]-
0007     







IMMUNIZATIONS

No Known Immunizations



SOCIAL HISTORY

Never Assessed



REASON FOR VISIT

Xanax on Hold



PLAN OF CARE





VITAL SIGNS





MEDICATIONS







 Medication  Instructions  Dosage  Frequency  Start Date  End Date  Duration  
Status

 

 Xanax 1 MG  Orally Once a day  1 tablet  24h          Active







RESULTS

No Results



PROCEDURES

No Known procedures



INSTRUCTIONS





MEDICATIONS ADMINISTERED

No Known Medications



MEDICAL (GENERAL) HISTORY







 Type  Description  Date

 

 Medical History  hypothyroidism   

 

 Medical History  type II diabetes   

 

 Medical History  back pain   

 

 Medical History  hyperlipidemia   

 

 Medical History  anxiety   

 

 Medical History  mood disorder   

 

 Medical History  heart murmur   

 

 Medical History  chronic bronchitis   

 

 Medical History  [place]   

 

 Surgical History  appendectomy   

 

 Surgical History  gallbladder removal   

 

 Hospitalization History  childbirth   

 

 Hospitalization History  pancreatitis  2005

 

 Hospitalization History  appendectomy   

 

 Hospitalization History  Anaphylaxis to Bactrim  2016

## 2019-01-30 NOTE — XMS REPORT
[facility]

 Created on: 2018



Galvez Libra

External Reference #: [id number]

: 1965

Sex: Female



Demographics







 Address  [address] [place] [address]  [address]

 

 Preferred Language  Unknown

 

 Marital Status  Unknown

 

 Restoration Affiliation  Unknown

 

 Race  Unknown

 

 Ethnic Group  Unknown





Author







 Author  ANIKET MAYFIELD

 

 [facility]

 

 Address  [address] [place]  [address]



 

 Phone  [phone]







Care Team Providers







 Care Team Member Name  Role  Phone

 

 ANIKET MAYFIELD  Unavailable  [phone]







PROBLEMS







 Type  Condition  ICD9-CM Code  QPO53-SR Code  Onset Dates  Condition Status  
SNOMED Code

 

 Problem  Anxiety disorder, unspecified     F41.9     Active  356773689

 

 Problem  Violation of controlled substance agreement     Z91.14     Active  
[id number]

 

 Problem  Intestinal malabsorption, unspecified     K90.9     Active  73770368

 

 Problem  Acquired hypothyroidism     E03.9     Active  389849533

 

 Problem  Diabetes     E11.9     Active  658838957

 

 Problem  Hypertriglyceridemia     E78.1     Active  717837785

 

 Problem  Status post cholecystectomy     Z90.49     Active  199398986







ALLERGIES

No Information



ENCOUNTERS







 Encounter  Location  Date  Diagnosis

 

 [facility]  [address] [address]-
0[address]  01 Oct, 2018   

 

 [facility]  [address] [address]-
7467  29 Aug, 2018  Yeast infection B37.9

 

 [place] [address] [address]-
7283  21 Aug, 2018   

 

 [facility]  [address] [address]-
0586    Intestinal malabsorption, unspecified K90.9 ; Diarrhea, 
unspecified R19.7 ; Diabetes E11.9 and Marijuana smoker F12.90

 

 [facility]  [address] 
[id number]    Dental examination Z01.20

 

 [facility]  [address] [place] [id number] [place] [address] 
[id number]  10 Jul, 2018  Dental examination Z01.20 and Dental caries K02.9

 

 [facility]  [address] [address]-
3545    Benzodiazepine use agreement exists Z02.89 ; Therapeutic 
drug monitoring Z51.81 and Violation of controlled substance agreement Z91.14

 

 [place] [address] [address]-
9406  05 2018   

 

 [place] [address] [address]-
7751    Ketoacidosis E87.2 ; Status post cholecystectomy Z90.49 ; 
Diabetes E11.9 ; Acquired hypothyroidism E03.9 ; Hypertriglyceridemia E78.1 and 
Vaginal yeast infection B37.3

 

 [place] [address] [address]-
1438  15 May, 2018   

 

 [place] [address] [address]-
1832     

 

 [place] [address] [address]-
6537  20 Mar, 2018   

 

 [facility]  [address] [address]-
2763     

 

 [place] [address] [address]-
1521     

 

 [place] [address] [address]-
2525  10 Andrei, 2018  Diabetes E11.9 and Drug-induced acute pancreatitis with 
uninfected necrosis K85.31

 

 [place] [address] [address]-
7251  27 Dec, 2017   

 

 [facility]  [address] [address]-
9701     

 

 [facility]  [address] [address]
-[address]  10 Nov, 2017  Crushing injury of right foot, initial encounter S97.81XA

 

 [place] [address] [address]-
8678  27 Sep, 2017   

 

 [facility]  [address], [place] [address]-
4403  21 Sep, 2017   

 

 [facility]  [address], [place] [address]
-[address]  19 Sep, 2017  Acute non-recurrent pansinusitis J01.40

 

 [facility]  [address], [place] [address]-
4558  19 Sep, 2017   

 

 [facility]  [address] [place] [id number] [place] [address]-
1199  04 Aug, 2017   

 

 [facility]  [address] [id number] [place] [address]-
4599     

 

 [facility]  [address] [place] [id number] [place] [address]-
9635  26 May, 2017   

 

 [facility]  [address] [place] [id number] [place] [address]-
9312  08 May, 2017  Diabetes E11.9 ; Anxiety disorder, unspecified F41.9 and 
Acquired hypothyroidism E03.9

 

 [facility]  [address], [place] [address]-
3792  01 May, 2017   

 

 [facility]  [address] [id number] [place] [address]-
1571     

 

 [facility]  [address], [place] [address]-
3189     

 

 [facility]  [address] [address]-
0427  06 Mar, 2017   

 

 [facility]  [address]65[address], [place] [address]-
8648     

 

 [facility]  [address] [id number] [place] [address]-
1492     

 

 [facility]  [address], [place] [address]-
6382     

 

 [facility]  [address] [address]-
3342     

 

 [facility]  [address] [place] [id number] [place] [address]-
0247    Acute non-recurrent maxillary sinusitis J01.00

 

 [facility]  [address] [place] [address]-
6433     

 

 [facility]  [address] [place] [id number] [place] [address]-
0796     

 

 [facility]  [address] [place] [id number] [place] [address]-
4903  12 Dec, 2016   

 

 [facility]  [address] [place] [id number] [place] [address]-
7498  15 Nov, 2016   

 

 [facility]  [address] [place] [id number] [place] [address]-
3868  12 Oct, 2016  Diabetes E11.9

 

 [facility]  [address] [place] [id number] [place] [address]-
2425  28 Sep, 2016  Pelvic pain R10.2 ; Leukocytosis, unspecified D72.829 ; 
Constipation, unspecified constipation type K59.00 and History of pancreatitis 
Z87.19

 

 [facility]  [address]65[address], [place] [address]-
5315  22 Sep, 2016   

 

 [facility]  [address] [id number] [place] [address]-
4154  14 Sep, 2016  Diabetes E11.9

 

 [facility]  [address], [place] [address]-
2668  13 Sep, 2016   

 

 [facility]  [address] [place] [id number] [place] [address]-
9640  09 Sep, 2016  Vaginal yeast infection B37.3

 

 [facility]  [address] [place] [address]-
5312  08 Sep, 2016   

 

 [facility]  [address] [place] [id number] [place] [address]-
3952  30 Aug, 2016  Diabetes E11.9

 

 [facility]  [address], [place] [address]-
1018  25 Aug, 2016  Anxiety disorder, unspecified F41.9

 

 [facility]  [address] [place] [address] 086665-
9814  17 Aug, 2016  Vaginal yeast infection B37.3

 

 [facility]  [address] [place] [address]-
9139  17 Aug, 2016   

 

 [facility]  [address] [place] [address]-
1908    Anxiety disorder, unspecified F41.9

 

 [facility]  [address] [place] [id number] [place] [address]-
2289    Vaginal yeast infection B37.3

 

 [facility]  [address] [place] [address]-
5671    Anxiety disorder, unspecified F41.9

 

 [facility]  [address] [place] [address] [place] [address]-
2116    Anxiety disorder, unspecified F41.9

 

 [facility]  [address] [place] [address]-
8762  06 May, 2016  Anxiety disorder, unspecified F41.9

 

 [facility]  [address], [place] [address]-
4345  04 May, 2016  Diabetes E11.9

 

 [facility]  [address] [place] [address]-
1883    Anxiety disorder, unspecified F41.9

 

 [facility]  [address] [place] [address]-
5462     

 

 [facility]  [address] [place] [address] 289[address]-
1323  25 Mar, 2016  Vaginal yeast infection B37.3

 

 [facility]  [address], [place] [address]-
7433  15 Mar, 2016   

 

 [facility]  [address], [place] [address]-
8008     

 

 [facility]  [address] [place] [address] 821462-
0393     

 

 [facility]  [address] [place] [address]-
4347     

 

 [facility]  [address] [place] [place] [address] 6[address]-
1350  15 Andrei, 2016   

 

 [facility]  [address] [place] [address] 732438-
4394  22 Dec, 2015   

 

 [facility]  [address] [place] [id number] [place] [address] 784243-
8191    Diabetes E11.9 ; Acquired hypothyroidism E03.9 ; 
Hyperlipidemia, unspecified hyperlipidemia E78.5 and Anxiety F41.9

 

 [facility]  [address] [place] [address]-
5263     

 

 [facility]  [address] [place] [address]-
2910  27 Oct, 2015   

 

 [facility]  [address] [place] [address] [place] [address] 35272345-
1787  29 Sep, 2015   

 

 [facility]  [address] [place] [address] [place] [address]-
7222  01 Sep, 2015   

 

 [facility]  [address] [place] [address]-
8008  04 Aug, 2015   

 

 [facility]  [address] [place] [address]-
0937  15 Jul, 2015   

 

 [facility]  [address] [id number] [place] [address]-
4622    DUB (dysfunctional uterine bleeding) 626.8 and Pap test, as 
part of routine gynecological examination V76.2

 

 [facility]  [address] [place] [id number] [place] [address]-
6395     

 

 [facility]  [address] [id number] [place] [address]-
0965     

 

 [facility]  [address] [id number] [place] [address]-
2120     

 

 [facility]  [address] [id number] [place] [address]-
3730     

 

 [facility]  [address] [id number] [place] [address]-
3407  15 Eagle, 2015  Diabetes 250.00

 

 [facility]  [address] [id number] [place] [address]-
3315     

 

 [facility]  [address] [id number] [place] [address]-
2016  19 May, 2015   

 

 [facility]  [address] [id number] [place] [address]-
6219  13 May, 2015  Diabetes 250.00

 

 [facility]  [address] [id number] [place] [address]-
3743  12 May, 2015   

 

 [facility]  [address] [id number] [place] [address]-
7051  07 May, 2015   

 

 [facility]  [address] [id number] [place] [address]-
8609  07 May, 2015   

 

 [facility]  [address] [id number] [place] [address]-
7016  05 May, 2015   

 

 [facility]  [address] [id number] [address]-
5156  01 May, 2015   

 

 [facility]  [address] [id number] [place] [address]-
1689     

 

 [facility]  [address] [id number] [place] [address]-
3738     

 

 [facility]  [address] [id number] [place] [address]-
7596     

 

 [facility]  [address] [id number] [place] [address]-
9457  17 Mar,    

 

 [facility] [place] [facility]  [address] [id number] [place] [address]-
9212  17 Mar,    

 

 [facility] [place] [facility]  [address] [id number] [place] [address]-
6126  17 Mar,    

 

 [facility] [place] [facility]  [address] [id number] [place] [address]-
1828  17 Mar,    

 

 [facility] [place] [facility]  [address] [id number] [place] [address]-
0217  09 Mar,    

 

 [facility] [place] [facility]  [address] [id number] [place] [address]-
7241  09 Mar,    

 

 [facility] [place] [facility]  [address] [id number] [place] [address]-
3056  06 Mar,    

 

 [facility] [place] [facility]  [address] [id number] [place] [address]-
8542  03 Mar,    

 

 [facility] [place] [facility]  [address] [id number] [place] [address]-
4776  03 Mar,    

 

 [facility] [place] [facility]  [address] [id number] [place] [address]-
6804  ,    

 

 [facility] [place] [facility]  [address] [facility] [id number] [place] [address]-
5689  ,    

 

 [facility] [place] [facility]  [address] [id number] [place] [address]-
2886  ,    

 

 [facility] [place] [facility]  [address] [facility] [id number] [place] [address]-
2546  ,    

 

 [facility] [place] [facility]  [address] [id number] [place] [address]-
9906  ,    

 

 [facility] [place] [facility]  [address] [facility] [id number] [place] [address]-
2546  ,    

 

 [facility] [place] [facility]  [address] [facility] [id number] [place] [address]-
2546     

 

 [facility] [place] [facility]  [address] [id number] [place] [address]-
4770     

 

 [facility] [place] [facility]  [address] [id number] [place] [address]-
5576     

 

 [facility] [place] [facility]  [address] [id number] [place] [address]-
6935     

 

 [facility] [place] [facility]  [address] [id number] [place] [address]-
4427     

 

 [facility] [place] [facility]  [address] [id number] [place] [address]-
4608     

 

 [facility] [place] [facility]  [address] [id number] [place] [address]-
7876     

 

 [facility] [place] [facility]  [address] [id number] [place] [address]-
1976     

 

 [facility] [place] [facility]  [address] [id number] [place] [address]-
7293     

 

 [facility] [place] [facility]  [address] [id number] [place] [address]-
8659     

 

 [facility] [place] [facility]  [address] [id number] [place] [address]-
0743     

 

 [facility] [place] [facility]  [address] [id number] [address]-
3978     

 

 [facility] [place] [facility]  [address] [id number][place] [address]-
3875     

 

 [facility] [place] [facility]  [address] [id number] [place] [address]-
0175     

 

 [facility] [place] [facility]  [address] [id number] [place] [address]-
8466  23 Dec, 2014   

 

 [facility] [place] [facility]  [address] [id number] [place] [address]-
7776  23 Dec, 2014   

 

 [facility] [place] [facility]  [address] [id number] [place] [address]-
1041  22 Dec, 2014   

 

 [facility] [place] [facility]  [address] [id number] [place] [address]-
8862  22 Dec, 2014   

 

 [facility] [place] [facility]  [address] [id number] [place]
0576  17 Dec, 2014   

 

 [facility] [place] [facility]  [address] [id number] [place]
1786  17 Dec, 2014   

 

 [facility] [place] [facility]  [address] [id number] [place] [address]-
3330  15 Dec, 2014   

 

 [facility] [place] [facility]  [address] [id number] [place] [address]-
1552  15 Dec, 2014   

 

 [facility] [place] [facility]  [address] [id number] [place] [address]-
4551  12 Dec, 2014   

 

 [facility] [place] [facility]  [address] [id number] [place] [address]-
3549  08 Dec, 2014   

 

 [facility] [place] [facility]  [address] [id number] [place] [address]-
1037  08 Dec, 2014   

 

 [facility] [place] [facility]  [address] [id number] [place] [address]-
7968  08 Dec, 2014   

 

 [facility] [place] [facility]  [address] [facility] [id number] [place] [address]-
6071  08 Dec, 2014   

 

 [facility] [place] [facility]  [address] [id number] [place] [address]-
5989     

 

 [facility] [place] [facility]  [address] [id number] [place] [address]-
7806     

 

 [facility] [place] [facility]  [address] [id number] [place] [address]-
1289     

 

 [facility] [place] [facility]  [address] [id number] [place] [address]-
9815  13 2014   

 

 [facility] [place] [facility]  [address] [id number] [place]
4647  10 Nov, 2014   

 

 [facility] [place] [facility]  [address] [id number] [place] [address]-
8086  10 Nov, 2014   

 

 [facility] [place] [facility]  [address] [id number] [place] [address]-
0658  07 Oct, 2014   

 

 [facility] [place] [facility]  [address] [id number] [place]
8034  07 Oct, 2014   

 

 [facility] [place] [facility]  [address] [id number] [place] [address]-
5938  01 Oct, 2014   

 

 [facility] [place] [facility]  [address] [id number] [place] [address]-
3939  01 Oct, 2014   

 

 [facility] [place] [facility]  [address] [id number] [place] [address]-
8676  24 Sep, 2014   

 

 [facility] [place] [facility]  [address] [id number] [place] [address]-
9787  24 Sep, 2014   

 

 [facility] [place] [facility]  [address] [id number] [place] [address]-
9931  23 Sep, 2014   

 

 [facility] [place] [facility]  [address] [id number] [place] [address]-
7198  23 Sep, 2014   

 

 [facility] [place] [facility]  [address] [id number] [place] [address]-
8246  22 Sep, 2014   

 

 [facility] [place] [facility]  [address] [id number] [place] [address]-
8723  22 Sep, 2014   

 

 [facility] [place] [facility]  [address] [id number] [place] [address]-
6000  19 Aug, 2014   

 

 [facility] [place] [facility]  [address] [id number] [place] [address]-
1302  19 Aug, 2014   

 

 [facility] [place] [facility]  [address] [id number] [place] [address]-
7312     

 

 [facility] [place] [facility]  [address] [id number] [place] [address]-
6225     

 

 [facility] [place] [facility]  [address] [id number] [place] [address]-
5008  10 Eagle, 2014   

 

 [facility] [place] [facility]  [address] [id number] [place] [address]-
5139     

 

 [facility] [place] [facility]  [address] [id number] [place]-
8838     

 

 [facility] [place] [facility]  [address] [id number] [place] [address]-
4570  07 May, 2014   

 

 [facility] [place] [facility]  [address] [id number] [place] [address]-
3450  07 May, 2014   

 

 [facility] [place] [facility]  [address] [id number] [place] [address]-
0610     

 

 [facility] [place] [facility]  [address] [id number] [place] [address]-
4795     

 

 [facility] [place] [facility]  [address] [id number] [place] [address]-
0736     

 

 [facility] [place] [facility]  [address] [id number] [place] [address]-
3072     

 

 [facility] [place] [facility]  [address] [id number] [place] [address]-
7846     

 

 [facility] [place] [facility]  [address] [id number] [place] [address]-
3504     

 

 [facility] [place] [facility]  [address] [id number] [place] [address]-
8619     

 

 [facility] [place] [facility]  [address] [id number] [place] [address]-
8177     

 

 [facility] [place] [facility]  [address] [id number] [place] [address]-
2231     

 

 [facility] [place] [facility]  [address] [id number] [place] [address]-
6763     

 

 [facility] [place] [facility]  [address] [id number] [place] [address]-
9933  17 Mar, 2014   

 

 [facility] [place] [facility]  [address] [id number] [place] [address]-
2546  17 Mar, 2014   

 

 [facility]  [address] [id number] [place] [address]-
3896     

 

 [facility] [place] [facility]  [address] [id number] [place] [address]-
2546     

 

 [facility]  [address] [id number] [place] [address]-
2546     

 

 [facility] [place] [facility]  [address] [id number] [place] [address]-
2546     

 

 [facility] [place] [facility]  [address] [id number] [place] [address]-
6476  25 Oct, 2013   

 

 [facility] [place] [facility]  [address] [id number] [place] [address]-
2966  25 Oct, 2013   

 

 [facility] [place] [facility]  [address] [id number] [place] [address]-
2546  23 Sep, 2013   

 

 [facility]  [address] [id number] [place] [address]-
2546  06 Aug, 2013   

 

 [facility] [place] [facility]  [address] [id number] [place] [address]-
2546  05 Aug, 2013   

 

 [facility]  [address] [id number] [place] [address]-
[address]46     

 

 [facility] [place] [facility]  [address] [id number] [place] [address]-
2546     

 

 [facility] [place] [facility]  [address] [id number] [place] [address]-
2546     

 

 [facility] [place] [facility]  [address] [id number] [place] [address]-
2546  28 Dec, 2012   

 

 [facility] [place] [facility]  [address] [id number] [place] [address]-
2546  19 Dec, 2012   

 

 [facility] [place] [facility]  [address] [id number] [place] [address]-
0748  19 Dec, 2012   

 

 [facility] [place] [facility]  [address] [id number] [place] [address]-
5881  13 Dec, 2012   

 

 [facility] [place] [facility]  [address] [id number] [place] [address]-
4272  13 Dec, 2012   

 

 [facility] [place] [facility]  [address] [id number] [place] [address]-
3577     

 

 [facility] [place] [facility]  [address] [id number] [place] [address]-
9778     

 

 [facility] [place] [facility]  [address] [id number] [place] [address]-
6470  19 Oct, 2012   

 

 [facility] [place] [facility]  [address] [id number] [place] [address]-
9173  18 Oct, 2012   

 

 [facility] [place] [facility]  [address] [id number] [place] [address]-
0247  17 Oct, 2012   

 

 [facility] [place] [facility]  [address] [id number] [place] [address]-
4270  17 Oct, 2012   

 

 [facility] [place] [facility]  [address] [id number] [place] [address]-
8941  16 Oct, 2012   

 

 [facility] [place] [facility]  [address] [facility] [id number] [address]-
1350  16 Oct, 2012   

 

 [facility] [place] [facility]  [address] [id number][place] [address]-
7082  15 Oct, 2012   

 

 [facility] [place] [facility]  [address] [id number], [place] [address]-
6001  27 Sep, 2012   

 

 [facility] [place] [facility]  [address] [id number] [place] [address]-
3157  26 Sep, 2012   

 

 [facility] [place] [facility]  [address] [id number][place] [address]-
3355     

 

 [facility] [place] [facility]  [address] [facility] [id number] [place] [address]-
9318     

 

 [facility] [place] [facility]  [address] [id number] [place] [address]-
7276     

 

 [facility] [place] [facility]  [address] [id number] [place] [address]-
5305     

 

 [facility] [place] [facility]  [address] [id number] [place] [address]     

 

 [facility] [place] [facility]  [address] [id number] [place] [address]-
1156     

 

 [facility] [place] [facility]  [address] [id number] [place] [address]-
7575     

 

 [facility] [place] [facility]  [address] [id number] [place] [address]-
7682     

 

 [facility] [place] [facility]  [address] [id number] [place] [address]-
4056  01 Mar, 2012   

 

 [facility] [place] [facility]  [address] [id number] [place] [address]-
6805     

 

 [facility] [place] [facility]  [address] [id number] [place] [address]-
8631     

 

 [facility] [place] [facility]  [address] [id number] [place] [address]-
1045     

 

 [facility] [place] [facility]  [address] [id number] [place] [address]-
6222  10 Andrei, 2012   

 

 [facility] [place] [facility]  [address] [id number] [place] [address]-
7113     

 

 [facility] [place] [facility]  [address] [id number] [place] [address]-
6450  20 Dec, 2011   

 

 [facility] [place] [facility]  [address] [id number] [place] [address]-
4763  20 Dec, 2011   

 

 [facility] [place] [facility]  [address] [id number] [place] [address]-
5516  13 Dec, 2011   

 

 [facility] [place] [facility]  [address] [id number] [place] [address]-
7980  08 Dec, 2011   

 

 [facility]  [address], [place] [address]
2571  08 Dec, 2011   

 

 [facility]  [address], [place] [address]
6971  06 Dec, 2011   

 

 [facility]  [address] [facility] [id number] [address]-
1357  10 Oct, 2011   

 

 [facility]  [address] [place] [address]-
8183  10 Oct, 2011   

 

 [facility]  [address], [place] [address]-
9158     

 

 [facility]  [address] [place] [address]-
7183  20 Dec, 2010   

 

 [facility]  [address], [place] [address]-
4405  20 Dec, 2010   

 

 [facility]  [address], [place] [address]-
2826     

 

 [facility]  [address], [place] [address]-
2672     

 

 [facility]  [address], [place] [address]-
6587  15 Oct, 2009   

 

 [facility]  [address], [place] [address]-
9110  15 Oct, 2009   

 

 [facility]  [address], [place] [address]-
1455     







IMMUNIZATIONS

No Known Immunizations



SOCIAL HISTORY

Never Assessed



REASON FOR VISIT

Xanax/Contract Violation



PLAN OF CARE





VITAL SIGNS





MEDICATIONS

Unknown Medications



RESULTS

No Results



PROCEDURES

No Known procedures



INSTRUCTIONS





MEDICATIONS ADMINISTERED

No Known Medications



MEDICAL (GENERAL) HISTORY







 Type  Description  Date

 

 Medical History  hypothyroidism   

 

 Medical History  type II diabetes   

 

 Medical History  back pain   

 

 Medical History  hyperlipidemia   

 

 Medical History  anxiety   

 

 Medical History  mood disorder   

 

 Medical History  heart murmur   

 

 Medical History  chronic bronchitis   

 

 Medical History  [place]   

 

 Surgical History  appendectomy   

 

 Surgical History  gallbladder removal   

 

 Hospitalization History  childbirth   

 

 Hospitalization History  pancreatitis  2005

 

 Hospitalization History  appendectomy   

 

 Hospitalization History  Anaphylaxis to Bactrim  2016

## 2019-01-30 NOTE — XMS REPORT
[facility]

 Created on: 2016



Libra Galvez

External Reference #: [id number]

: 1965

Sex: Female



Demographics







 Address  [address]  [address]

 

 Home Phone  [phone]

 

 Preferred Language  Unknown

 

 Marital Status  Unknown

 

 Anglican Affiliation  Unknown

 

 Race  White

 

 Ethnic Group  Not  or 





Author







 Author  BULMARO OVALLE

 

 Organization  [employer]

 

 Address  Unknown

 

 Phone  Unavailable







Care Team Providers







 Care Team Member Name  Role  Phone

 

 BULMARO OVALLE  CP  Unavailable



                                                                



Allergies

          No Known Allergies                                                   
                                     



Problems

          





 Problem Type  Condition  Code  Onset Dates  Condition Status

 

 Assessment  Vaginal yeast infection  B37.3     Active

 

 Problem  Anxiety state, unspecified  300.00     Active

 

 Problem  Pure hyperglyceridemia  272.1     Active

 

 Problem  Diabetes  250.00     Active

 

 Problem  Dysuria  788.1     Active

 

 Problem  Anxiety disorder, unspecified  F41.9     Active

 

 Problem  Other chronic nonalcoholic liver disease  571.8     Active

 

 Problem  Unspecified episodic mood disorder  296.90     Active

 

 Problem  Other bursitis disorders  727.3     Active

 

 Problem  Lumbago  724.2     Active



                                                                               
                                                                               
                    



Medications

          





 Medication  Code System  Code  Instructions  Start Date  End Date  Status  
Dosage

 

 Amaryl  NDC  [address]-09  2 MG             TAKE ONE TABLET BY MOUTH TWICE 
DAILY IN THE MORNING AND THE EVENING

 

 MetFORMIN HCl ER  NDC  [id number]  500 MG Orally 2 times a day           2 
tablet with morning and evening meal

 

 Xanax  NDC  [address]-13  1 MG Orally Once a day  2016        1 
tablet

 

 Diflucan  NDC  [address]-06  100 MG Orally Once a day  Aug 17, 2016  Aug 24, 
2016     1 tablet



                                                                               
                             



Results

          No Known Results                                                     
               



Summary Purpose

          [employer] Submission

## 2019-01-30 NOTE — XMS REPORT
[facility]

 Created on: 2015



Galvez Libra

External Reference #: [id number]

: 1965

Sex: Female



Demographics







 Address  [address]  [address]

 

 Home Phone  [phone]

 

 Preferred Language  Unknown

 

 Marital Status  Unknown

 

 Yazdanism Affiliation  Unknown

 

 Race  White

 

 Ethnic Group  Not  or 





Author







 Author  ANIKET MAYFIELD

 

 [facility]  [employer]

 

 Address  Unknown

 

 Phone  Unavailable







Care Team Providers







 Care Team Member Name  Role  Phone

 

 ANIKET MAYFIELD  CP  Unavailable



                                                                



Allergies, Adverse Reactions, Alerts

          





 Substance  Reaction  Event Type

 

 N.K.D.A.  Info Not Available  Non Drug Allergy



                                                                               
         



Problems

          





 Problem Type  Condition  Code  Onset Dates  Condition Status

 

 Assessment  Acquired hypothyroidism  E03.9     Active

 

 Problem  Pure hyperglyceridemia  272.1     Active

 

 Assessment  Diabetes  E11.9     Active

 

 Assessment  Anxiety  F41.9     Active

 

 Assessment  Hyperlipidemia, unspecified hyperlipidemia  E78.5     Active

 

 Problem  Dysuria  788.1     Active

 

 Problem  Other bursitis disorders  727.3     Active

 

 Problem  Diabetes  250.00     Active

 

 Problem  Unspecified episodic mood disorder  296.90     Active

 

 Problem  Anxiety state, unspecified  300.00     Active

 

 Problem  Lumbago  724.2     Active

 

 Problem  Other chronic nonalcoholic liver disease  571.8     Active



                                                                               
                                                                               
                                        



Medications

          





 Medication  Code System  Code  Instructions  Start Date  End Date  Status  
Dosage

 

 MetFORMIN HCl ER  NDC  [id number]  500 MG Orally 2 times a day           2 
tablet with morning and evening meal

 

 Xanax  NDC  [address]-00  1 MG Orally Once a day  May 12, 2015        1 tablet

 

 Amaryl  NDC  [id number]  2 MG Orally 2 times a day           1 tablets in the 
morning and 1 in the evening



                                                                               
                             



Procedures

          





 Procedure  Coding System  Code  Date

 

 MICROALBUMIN, SEMIQUANT  CPT-4  03024  2015

 

 ASSAY OF URINE CREATININE  CPT-4  82179  2015

 

 GLYCATED HEMOGLOBIN TEST  CPT-4  10224  2015

 

 Office Visit, Est Pt., Level 3  CPT-4  23468  2015

 

 MICROALBUMIN, QUANTITATIVE  CPT-4  96566  2015



                                                                               
                                                           



Vital Signs

          





 Date/Time:  2015

 

 Temperature  98.6 F

 

 Weight  153.0 lbs

 

 Height  62 in

 

 BMI  27.98 Index

 

 Blood Pressure Diastolic  80 mmHg

 

 Blood Pressure Systolic  128 mmHg

 

 Cardiac Monitoring Heart Rate  92 bpm



                                                                    



Results

          





 Name  Result  Date  Reference Range  Unit  Abnormality Flag

 

 MICROALBUMIN/CREATININE RATIO, URINE               

 

 ----Microalb/Creat Ratio  54.6  2015  0.0-30.0   mg/g creat  H

 

 ----Creatinine, Urine  71.2  2015  15.0-278.0   mg/dL   

 

 ----Microalbumin, Urine  38.9  2015  0.0-17.0   ug/mL  H

 

 A1C (IN HOUSE)               

 

 ----A1C IN HOUSE  8.2  2015  4.30 - 5.6 %       

 

 ----Previous A1c  7.6  2015         

 

 ----Lot #  [id number]  2015         

 

 ----Exp date  2015         

 

 MICROALBUMIN, URINE (IN HOUSE)               

 

 ----Lot #  [id number]  2015         

 

 ----CRE  100  2015         

 

 ----Exp date  2015         

 

 ----ALB  80  2015         

 

 ----Control  +  2015         

 

 ----Control  +  2015         

 

 ----A:C (IN HOUSE)  30-300mg/g  2015         

 

 ----Clarity  Clear  2015         

 

 ----Color  Yellow  2015         

 

 ----Lot #  [id number]  2015         

 

 ----Exp date  2015         

 

 ----MICROALBUMIN  Abnormal  2015         



                                                                               
         



Summary Purpose

          [employer] Submission

## 2019-01-30 NOTE — XMS REPORT
Continuity of Care Document

 Created on: 2019



Libra Glavez

External Reference #: [id number]

: 1965

Sex: Female



Demographics







 Address  [address]  [address]

 

 Home Phone  [phone] x

 

 Preferred Language  Unknown

 

 Marital Status  Unknown

 

 Restorationist Affiliation  Unknown

 

 Race  Unknown

 

 Ethnic Group  Unknown





Author







 Author  [facility]

 

 Address  Unknown

 

 Phone  Unavailable



              



Allergies

      





 Active            Description            Code            Type            
Severity            Reaction            Onset            Reported/Identified   
         Relationship to Patient            Clinical Status        

 

 Yes            Amaryl                         Drug Allergy                    
                               2011                                  

 

 Yes            Amaryl                         Drug Allergy            N/A     
       N/A                         2011                                  

 

 Yes            sulfamethoxazole            [id number]            Drug Allergy 
           Moderate            HIVES                         2016        
                          

 

 Yes            trimethoprim            [id number]            Drug Allergy     
       Moderate            HIVES                         2016            
                      



                        



Medications

      



There is no data.                  



Problems

      





 Date Dx Coded            Attending            Type            Code            
Diagnosis            Diagnosed By        

 

 2009            ANIKET GALLARDO S                         272.1    
        Essential Hypertriglyceridemia                     

 

 2009            EFRAIN GALLARDOA S                         626.8    
        Dysfunctional Uterine Bleeding                     

 

 2009            EFRAIN GALLARDOA S                         724.2    
        Lower Back Pain                     

 

 2009                                      272.1            Essential 
Hypertriglyceridemia                     

 

 2009                                      626.8            Dysfunctional 
Uterine Bleeding                     

 

 2009                                      724.2            Lower Back 
Pain                     

 

 2009            NAVARRO CONDON MD                         272.1         
   Essential Hypertriglyceridemia                     

 

 2009            NAVARRO CONDON MD                         626.8         
   Dysfunctional Uterine Bleeding                     

 

 2009            NAVARRO CONDON MD                         724.2         
   Lower Back Pain                     

 

 2009            ANIKET GALLARDO S                         272.1    
        Essential Hypertriglyceridemia                     

 

 2009            BRUNO GALLARDONDA S                         626.8    
        Dysfunctional Uterine Bleeding                     

 

 2009            BRUNO GALLARDONDA S                         724.2    
        Lower Back Pain                     

 

 2009            EFRAIN GALLARDOA S                         272.1    
        Essential Hypertriglyceridemia                     

 

 2009            BRUNO GALLARDONDA S                         626.8    
        Dysfunctional Uterine Bleeding                     

 

 2009            BRUNO GALLARDONDA S                         724.2    
        Lower Back Pain                     

 

 2009            EFRAIN GALLARDOA S                         272.1    
        Essential Hypertriglyceridemia                     

 

 2009            TRAN APRN, ANIKET S                         626.8    
        Dysfunctional Uterine Bleeding                     

 

 2009            TRAN APRN, ANIKET S                         724.2    
        Lower Back Pain                     

 

 2009            TRAN PYLEN, ANIKET S                         272.1    
        Essential Hypertriglyceridemia                     

 

 2009            TRAN APRN, ANIKET S                         626.8    
        Dysfunctional Uterine Bleeding                     

 

 2009            TRAN PYLEN, ANIKET S                         724.2    
        Lower Back Pain                     

 

 2009            MIRANDA LCPC, JUANA B                         272.1      
      Essential Hypertriglyceridemia                     

 

 2009            MIRANDA LCPC, JUANA B                         626.8      
      Dysfunctional Uterine Bleeding                     

 

 2009            MIRANDA LCPC, JUANA B                         724.2      
      Lower Back Pain                     

 

 2009            TRAN SOLOMON, ANIKET S                         272.1    
        Essential Hypertriglyceridemia                     

 

 2009            TRAN PYLEN, ANIKET S                         626.8    
        Dysfunctional Uterine Bleeding                     

 

 2009            TRAN SOLOMON, ANIKET S                         724.2    
        Lower Back Pain                     

 

 2009            TRAN SOLOMON, ANIKET S                         272.1    
        Essential Hypertriglyceridemia                     

 

 2009            TRAN PYLEN, ANIKET S                         626.8    
        Dysfunctional Uterine Bleeding                     

 

 2009            TRAN SOLOMON, ANIKET S                         724.2    
        Lower Back Pain                     

 

 2009            TRAN SOLOMON, ANIKET S                         272.1    
        Essential Hypertriglyceridemia                     

 

 2009            TRAN SOLOMON, ANIKET S                         626.8    
        Dysfunctional Uterine Bleeding                     

 

 2009            TRNA SOLOMON, ANIKET S                         724.2    
        Lower Back Pain                     

 

 2009            TRAN SOLOMON, ANIKET S                         244.9    
        HYPOTHYROIDISM                     

 

 2009                                      244.9            
HYPOTHYROIDISM                     

 

 2009            NAVARRO CONDON MD                         244.9         
   HYPOTHYROIDISM                     

 

 2009            TRAN SOLOMON, ANIKET S                         244.9    
        HYPOTHYROIDISM                     

 

 2009            TRAN SOLOMON, ANIKET S                         244.9    
        HYPOTHYROIDISM                     

 

 2009            TRAN PYLEN, ANIKET S                         244.9    
        HYPOTHYROIDISM                     

 

 2009            TRAN SOLOMON, ANIKET S                         244.9    
        HYPOTHYROIDISM                     

 

 2009            MIRANDA LCPC, JUANA B                         244.9      
      HYPOTHYROIDISM                     

 

 2009            TRAN APRN, ANIKET S                         244.9    
        HYPOTHYROIDISM                     

 

 2009            TRAN APRN, ANIKET S                         244.9    
        HYPOTHYROIDISM                     

 

 2009            TRAN APRN, ANIKET S                         244.9    
        HYPOTHYROIDISM                     

 

 2009            TRAN APRN, ANIKET S                         703.0    
        Ingrowing Nail                     

 

 2009                                      703.0            Ingrowing 
Nail                     

 

 2009            NAVARRO CONDON MD                         703.0         
   Ingrowing Nail                     

 

 2009            TRAN APRN, ANIKET S                         703.0    
        Ingrowing Nail                     

 

 2009            TRAN APRN, ANIKET S                         703.0    
        Ingrowing Nail                     

 

 2009            TRAN APRN, ANIKET S                         703.0    
        Ingrowing Nail                     

 

 2009            TRAN APRN, ANIKET S                         703.0    
        Ingrowing Nail                     

 

 2009            JUANA JEAN LCPC                         703.0      
      Ingrowing Nail                     

 

 2009            TRAN APRN, ANIKET S                         703.0    
        Ingrowing Nail                     

 

 2009            TRAN APRN, ANIKET S                         703.0    
        Ingrowing Nail                     

 

 2009            TRAN APRN, ANIKET S                         703.0    
        Ingrowing Nail                     

 

 2009            TRAN APRN, ANIKET S                         788.1    
        Dysuria                     

 

 2009                                      788.1            Dysuria      
               

 

 2009            NAVARRO CONDON MD                         788.1         
   Dysuria                     

 

 2009            TRAN APRN, ANIKET S                         788.1    
        Dysuria                     

 

 2009            TRAN APRN, ANIKET S                         788.1    
        Dysuria                     

 

 2009            TRAN APRN, ANIKET S                         788.1    
        Dysuria                     

 

 2009            TRAN APRN, ANIKET S                         788.1    
        Dysuria                     

 

 2009            MIRANDA PETERS, JUANA B                         788.1      
      Dysuria                     

 

 2009            TRAN APRN, ANIKET S                         788.1    
        Dysuria                     

 

 2009            TRAN APRN, ANIKET S                         788.1    
        Dysuria                     

 

 2009            TRAN APRN, ANIKET S                         788.1    
        Dysuria                     

 

 2009            TRAN APRN, ANIKET S                         462      
      Acute Pharyngitis                     

 

 2009            TRAN APRN, ANIKET S                         528.9    
        Mouth Pain                     

 

 2009                                      462            Acute 
Pharyngitis                     

 

 2009                                      528.9            Mouth Pain   
                  

 

 2009            NAVARRO CONDON MD                         462            
Acute Pharyngitis                     

 

 2009            NAVARRO CONDON MD                         528.9         
   Mouth Pain                     

 

 2009            TRAN APRN, ANIKET S                         462      
      Acute Pharyngitis                     

 

 2009            TRAN APRN, ANIKET S                         528.9    
        Mouth Pain                     

 

 2009            TRAN APRN, ANIKET S                         462      
      Acute Pharyngitis                     

 

 2009            TRAN APRN, ANIKET S                         528.9    
        Mouth Pain                     

 

 2009            TRAN APRN, ANIKET S                         462      
      Acute Pharyngitis                     

 

 2009            TRAN APRN, ANIKET S                         528.9    
        Mouth Pain                     

 

 2009            TRAN APRN, ANIKET S                         462      
      Acute Pharyngitis                     

 

 2009            TRAN APRN, ANIKET S                         528.9    
        Mouth Pain                     

 

 2009            MIRANDA LCPC, JUANA B                         462        
    Acute Pharyngitis                     

 

 2009            MIRANDA LCPC, JUANA B                         528.9      
      Mouth Pain                     

 

 2009            TRAN APRN, ANIKET S                         462      
      Acute Pharyngitis                     

 

 2009            TRAN APRN, ANIKET S                         528.9    
        Mouth Pain                     

 

 2009            TRAN APRN, ANIKET S                         462      
      Acute Pharyngitis                     

 

 2009            TRAN APRN, ANIKET S                         528.9    
        Mouth Pain                     

 

 2009            TRAN APRN, ANIKET S                         462      
      Acute Pharyngitis                     

 

 2009            TRAN APRN, ANIKET S                         528.9    
        Mouth Pain                     

 

 2010            TRAN APRN, ANIKET S                         250.00   
         DIABETES MELLITUS                     

 

 2010            TRAN APRN, ANIKET S                         272.2    
        HYPERLIPOPROTEINEMIA TYPE II-B                     

 

 2010                                      250.00            DIABETES 
MELLITUS                     

 

 2010                                      272.2            
HYPERLIPOPROTEINEMIA TYPE II-B                     

 

 2010            NAVARRO CONDON MD                         250.00        
    DIABETES MELLITUS                     

 

 2010            NAVARRO CONDON MD                         272.2         
   HYPERLIPOPROTEINEMIA TYPE II-B                     

 

 2010            TRAN APRN, ANIKET S                         250.00   
         DIABETES MELLITUS                     

 

 2010            TRAN APRN, ANIKET S                         272.2    
        HYPERLIPOPROTEINEMIA TYPE II-B                     

 

 2010            TRAN APRN, ANIKET S                         250.00   
         DIABETES MELLITUS                     

 

 2010            TRAN APRN, ANIKET S                         272.2    
        HYPERLIPOPROTEINEMIA TYPE II-B                     

 

 2010            TRAN APRN, ANIKET S                         250.00   
         DIABETES MELLITUS                     

 

 2010            TRAN APRN, ANIKET S                         272.2    
        HYPERLIPOPROTEINEMIA TYPE II-B                     

 

 2010            TRAN APRN, ANIKET S                         250.00   
         DIABETES MELLITUS                     

 

 2010            TRAN APRN, ANIKET S                         272.2    
        HYPERLIPOPROTEINEMIA TYPE II-B                     

 

 2010            MIRANDA LCPC, JUANA B                         250.00     
       DIABETES MELLITUS                     

 

 2010            MIRANDA LCPC, JUANA B                         272.2      
      HYPERLIPOPROTEINEMIA TYPE II-B                     

 

 2010            TRAN APRN, ANIKET S                         250.00   
         DIABETES MELLITUS                     

 

 2010            TRAN APRN, ANIKET S                         272.2    
        HYPERLIPOPROTEINEMIA TYPE II-B                     

 

 2010            TRAN APRN, ANIKET S                         250.00   
         DIABETES MELLITUS                     

 

 2010            TRAN APRN, ANIKET S                         272.2    
        HYPERLIPOPROTEINEMIA TYPE II-B                     

 

 2010            TRAN APRN, ANIKET S                         250.00   
         DIABETES MELLITUS                     

 

 2010            TRAN APRN, ANIKET S                         272.2    
        HYPERLIPOPROTEINEMIA TYPE II-B                     

 

 2011            TRAN APRN, ANIKET S                         112.1    
        VAGINITIS DWAYNE ALBICANS                     

 

 2011                                      112.1            VAGINITIS 
DWAYNE ALBICANS                     

 

 2011            NAVARRO CONDON MD                         112.1         
   VAGINITIS DWAYNE ALBICANS                     

 

 2011            TRAN SOLOMON, ANIKET S                         112.1    
        VAGINITIS DWAYNE ALBICANS                     

 

 2011            TRAN SOLOMON, ANIKET S                         112.1    
        VAGINITIS DWAYNE ALBICANS                     

 

 2011            TRAN SOLOMON, ANIKET S                         112.1    
        VAGINITIS DWAYNE ALBICANS                     

 

 2011            TRAN PYLEN, ANIKET S                         112.1    
        VAGINITIS DWAYNE ALBICANS                     

 

 2011            MIRANDA RODRIGUEZPC, JUANA B                         112.1      
      VAGINITIS DWAYNE ALBICANS                     

 

 2011            TRAN PYLEN, ANIKET S                         112.1    
        VAGINITIS DWAYNE ALBICANS                     

 

 2011            TRAN SOLOMON, ANIKET S                         112.1    
        VAGINITIS DWAYNE ALBICANS                     

 

 2011            TRAN SOLOMON, ANIKET S                         112.1    
        VAGINITIS DWAYNE ALBICANS                     

 

 2011            TRAN SOLOMON, ANIKET S                         524.62   
         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 
JOINT                     

 

 2011                                      524.62            
TEMPOROMANDIBULAR JOINT DISORDERS ARTHRALGIA OF TEMPOROMANDIBULAR JOINT        
             

 

 2011            NAVARRO CONDON MD                         524.62        
    TEMPOROMANDIBULAR JOINT DISORDERS ARTHRALGIA OF TEMPOROMANDIBULAR JOINT    
                 

 

 2011            TRAN SOLOMON, ANIKET S                         524.62   
         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 
JOINT                     

 

 2011            TRAN SOLOMON, ANIKET S                         524.62   
         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 
JOINT                     

 

 2011            TRAN SOLOMON, ANIKET S                         524.62   
         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 
JOINT                     

 

 2011            TRAN SOLOMON, ANIKET S                         524.62   
         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 
JOINT                     

 

 2011            MIRANDA PETERS, JUANA B                         524.62     
       TEMPOROMANDIBULAR JOINT DISORDERS ARTHRALGIA OF TEMPOROMANDIBULAR JOINT 
                    

 

 2011            TRAN SOLOMON, ANIKET S                         524.62   
         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 
JOINT                     

 

 2011            TRAN SOLOMON, ANIKET S                         524.62   
         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 
JOINT                     

 

 2011            TRAN APRN, ANIKET S                         524.62   
         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 
JOINT                     

 

 2012            TRAN APRN, ANIKET S                         571.8    
        OTHER CHRONIC NONALCOHOLIC LIVER DISEASE                     

 

 2012                                      571.8            OTHER CHRONIC 
NONALCOHOLIC LIVER DISEASE                     

 

 2012            NAVARRO CONDON MD                         571.8         
   OTHER CHRONIC NONALCOHOLIC LIVER DISEASE                     

 

 2012            TRAN APRN, ANIKET S                         571.8    
        OTHER CHRONIC NONALCOHOLIC LIVER DISEASE                     

 

 2012            TRAN APRN, ANIKET S                         571.8    
        OTHER CHRONIC NONALCOHOLIC LIVER DISEASE                     

 

 2012            TRAN APRN, ANIKET S                         571.8    
        OTHER CHRONIC NONALCOHOLIC LIVER DISEASE                     

 

 2012            TRAN APRN, ANIKET S                         571.8    
        OTHER CHRONIC NONALCOHOLIC LIVER DISEASE                     

 

 2012            JUANA JEAN LCPC B                         571.8      
      OTHER CHRONIC NONALCOHOLIC LIVER DISEASE                     

 

 2012            TRAN APRN, ANIKET S                         571.8    
        OTHER CHRONIC NONALCOHOLIC LIVER DISEASE                     

 

 2012            TRAN APRN, ANIKET S                         571.8    
        OTHER CHRONIC NONALCOHOLIC LIVER DISEASE                     

 

 2012            TRAN APRN, ANIKET S                         571.8    
        OTHER CHRONIC NONALCOHOLIC LIVER DISEASE                     

 

 2012            TRAN PYLEN, ANIKET S                         724.2    
        LUMBAGO                     

 

 2012                                      724.2            LUMBAGO      
               

 

 2012            NAVARRO CONDON MD                         724.2         
   LUMBAGO                     

 

 2012            TRAN APRN, ANIKET S                         724.2    
        LUMBAGO                     

 

 2012            TRAN APRN, ANIKET S                         724.2    
        LUMBAGO                     

 

 2012            TRAN APRN, ANIKET S                         724.2    
        LUMBAGO                     

 

 2012            TRAN APRN, ANIKET S                         724.2    
        LUMBAGO                     

 

 2012            MIRANDA PETERS, JUANA B                         724.2      
      LUMBAGO                     

 

 2012            TRAN PYLEN, ANIKET S                         724.2    
        LUMBAGO                     

 

 2012            TRAN APRN, ANIKET S                         724.2    
        LUMBAGO                     

 

 2012            TRAN APRN, ANIKET S                         724.2    
        LUMBAGO                     

 

 2012            TRAN APRN, ANIKET S                         272.1    
        PURE HYPERGLYCERIDEMIA                     

 

 2012            TRAN APRN, ANIKET S                         477.0    
        ALLERGIC RHINITIS DUE TO POLLEN                     

 

 2012                                      272.1            PURE 
HYPERGLYCERIDEMIA                     

 

 2012                                      477.0            ALLERGIC 
RHINITIS DUE TO POLLEN                     

 

 2012            NAVARRO CONDON MD                         272.1         
   PURE HYPERGLYCERIDEMIA                     

 

 2012            NAVARRO CONDON MD                         477.0         
   ALLERGIC RHINITIS DUE TO POLLEN                     

 

 2012            TRAN APRN, ANIKET S                         272.1    
        PURE HYPERGLYCERIDEMIA                     

 

 2012            TRAN APRN, ANIKET S                         477.0    
        ALLERGIC RHINITIS DUE TO POLLEN                     

 

 2012            TRAN APRN, ANIKET S                         272.1    
        PURE HYPERGLYCERIDEMIA                     

 

 2012            TRAN PYLEN, ANIKET S                         477.0    
        ALLERGIC RHINITIS DUE TO POLLEN                     

 

 2012            TRAN APRN, ANIKET S                         272.1    
        PURE HYPERGLYCERIDEMIA                     

 

 2012            TRAN APRN, ANIKET S                         477.0    
        ALLERGIC RHINITIS DUE TO POLLEN                     

 

 2012            TRAN APRN, ANIKET S                         272.1    
        PURE HYPERGLYCERIDEMIA                     

 

 2012            TRAN APRN, ANIKET S                         477.0    
        ALLERGIC RHINITIS DUE TO POLLEN                     

 

 2012            MIRANDA LCPC, JUANA B                         272.1      
      PURE HYPERGLYCERIDEMIA                     

 

 2012            MIRANDA LCPC, JUANA B                         477.0      
      ALLERGIC RHINITIS DUE TO POLLEN                     

 

 2012            TRAN APRN, ANIKET S                         272.1    
        PURE HYPERGLYCERIDEMIA                     

 

 2012            TRAN APRN, ANIKET S                         477.0    
        ALLERGIC RHINITIS DUE TO POLLEN                     

 

 2012            TRAN APRN, ANIKET S                         272.1    
        PURE HYPERGLYCERIDEMIA                     

 

 2012            TRAN APRN, ANIKET S                         477.0    
        ALLERGIC RHINITIS DUE TO POLLEN                     

 

 2012            TRAN APRN, ANIKET S                         272.1    
        PURE HYPERGLYCERIDEMIA                     

 

 2012            TRAN APRN, ANIKET S                         477.0    
        ALLERGIC RHINITIS DUE TO POLLEN                     

 

 2013                                      788.1            DYSURIA      
               

 

 2013            ROSEANNA GHOTRA, NAVARRO                         788.1         
   DYSURIA                     

 

 2013            TRAN APRN, ANIKET S                         788.1    
        DYSURIA                     

 

 2013            TRAN APRN, ANIKET S                         788.1    
        DYSURIA                     

 

 2013            TRAN APRN, ANIKET S                         788.1    
        DYSURIA                     

 

 2013            TRAN APRN, ANIKET S                         788.1    
        DYSURIA                     

 

 2013            JUANA JEAN LCPC B                         788.1      
      DYSURIA                     

 

 2013            TRAN APRN, ANIKET S                         788.1    
        DYSURIA                     

 

 2013            TRAN APRN, ANIKET S                         788.1    
        DYSURIA                     

 

 2013            TRAN APRN, ANIKET S                         788.1    
        DYSURIA                     

 

 12/15/2014            JUANA JEAN LCPC B                         296.90     
       MOOD DISORDER NOS                     

 

 12/15/2014            TRAN APRN, ANIKET S                         296.90   
         MOOD DISORDER NOS                     

 

 12/15/2014            TRAN APRN, ANIKET S                         296.90   
         MOOD DISORDER NOS                     

 

 12/15/2014            TRAN APRN, ANIKET S                         296.90   
         MOOD DISORDER NOS                     

 

 2014            JUANA JEAN LCPC B                         300.00     
       AN ANXIETY UNSPEC                     

 

 2014            TRAN APRN, ANIKET S                         300.00   
         AN ANXIETY UNSPEC                     

 

 2014            TRAN PYLEN, ANIKET S                         300.00   
         AN ANXIETY UNSPEC                     

 

 2014            TRAN PYLEN, ANIKET S                         300.00   
         AN ANXIETY UNSPEC                     

 

 2015            TRAN APRN, ANIKET S                         727.3    
        OTHER BURSITIS DISORDERS                     

 

 2016            BARB GHOTRA, JOSE NEVES            Ot            E11.9 
           TYPE 2 DIABETES MELLITUS WITHOUT COMPLIC                     

 

 2016            BARB GHOTRA, JOSE NEVES Ot            L50.0 
           ALLERGIC URTICARIA                     

 

 2016            JOSE DAY MD            Ot            R00.0 
           TACHYCARDIA, UNSPECIFIED                     

 

 2016            JOSE DAY MD, Ot            R21   
         RASH AND OTHER NONSPECIFIC SKIN ERUPTION                     

 

 2016            BRUEGGEMANN MD, JOSE T            Ot            
T78.2XXA            ANAPHYLACTIC SHOCK, UNSPECIFIED, INITIAL                   
  

 

 2016            BARB GHOTRA, JOSE NEVES            Ot            E11.9 
           TYPE 2 DIABETES MELLITUS WITHOUT COMPLIC                     

 

 2016            BARB GHOTRA, JOSE NEVES            Ot            L50.0 
           ALLERGIC URTICARIA                     

 

 2016            BARB GHOTRA, JOSE NEVES            Ot            R00.0 
           TACHYCARDIA, UNSPECIFIED                     

 

 2016            BARB GHOTRA, JOSE NEVES            Ot            R21   
         RASH AND OTHER NONSPECIFIC SKIN ERUPTION                     

 

 2016            BARB GHOTRA, JOSE NEVES            Ot            
T78.2XXA            ANAPHYLACTIC SHOCK, UNSPECIFIED, INITIAL                   
  

 

 2018            ULI DO REYNOLD B            Ot            E11.65       
     TYPE 2 DIABETES MELLITUS WITH HYPERGLYCE                     

 

 2018            DELCARTER DO, REYNOLD B            Ot            E87.2        
    ACIDOSIS                     

 

 2018            ULI DO, REYNOLD B            Ot            K81.0        
    ACUTE CHOLECYSTITIS                     

 

 2018            ULI DO, REYNOLD B            Ot            R19.7        
    DIARRHEA, UNSPECIFIED                     

 

 2018            ULI DO, REYNOLD B            Ot            Z79.84       
     LONG TERM (CURRENT) USE OF ORAL HYPOGLYC                     

 

 2018            DELCARTER DO, REYNOLD B            Ot            Z79.899      
      OTHER LONG TERM (CURRENT) DRUG THERAPY                     

 

 2018            ULI BOURGEOIS, REYNOLD B            Ot            E11.65       
     TYPE 2 DIABETES MELLITUS WITH HYPERGLYCE                     

 

 2018            ULI DO, REYNOLD B            Ot            E87.2        
    ACIDOSIS                     

 

 2018            DELCARTER DO, REYNOLD B            Ot            K81.0        
    ACUTE CHOLECYSTITIS                     

 

 2018            ULI DO, REYNOLD B            Ot            R19.7        
    DIARRHEA, UNSPECIFIED                     

 

 2018            DELCARTER DO, REYNOLD B            Ot            Z79.84       
     LONG TERM (CURRENT) USE OF ORAL HYPOGLYC                     

 

 2018            DELCARTER DO, REYNOLD B            Ot            Z79.899      
      OTHER LONG TERM (CURRENT) DRUG THERAPY                     

 

 2018            ULI DO, REYNOLD B            Ot            E11.65       
     TYPE 2 DIABETES MELLITUS WITH HYPERGLYCE                     

 

 2018            DELCARTER DO, REYNOLD B            Ot            E87.2        
    ACIDOSIS                     

 

 2018            DELMAN DO, REYNLOD B            Ot            K81.0        
    ACUTE CHOLECYSTITIS                     

 

 2018            ULI DO, REYNOLD B            Ot            R19.7        
    DIARRHEA, UNSPECIFIED                     

 

 2018            REYNOLD MCNALLY DO B            Ot            Z79.84       
     LONG TERM (CURRENT) USE OF ORAL HYPOGLYC                     

 

 2018            REYNOLD MCNALLY DO B            Ot            Z79.899      
      OTHER LONG TERM (CURRENT) DRUG THERAPY                     

 

 10/13/2018            REYNOLD MCNALLY DO B            Ot            E11.65       
     TYPE 2 DIABETES MELLITUS WITH HYPERGLYCE                     

 

 10/13/2018            JOSEF MCNALLY DOIC B            Ot            E87.2        
    ACIDOSIS                     

 

 10/13/2018            REYNOLD MCNALLY DO B            Ot            K81.0        
    ACUTE CHOLECYSTITIS                     

 

 10/13/2018            REYNOLD MCNALLY DO B            Ot            R19.7        
    DIARRHEA, UNSPECIFIED                     

 

 10/13/2018            REYNOLD MCNALLY DO B            Ot            Z79.84       
     LONG TERM (CURRENT) USE OF ORAL HYPOGLYC                     

 

 10/13/2018            REYNOLD MCNALLY DO B            Ot            Z79.899      
      OTHER LONG TERM (CURRENT) DRUG THERAPY                     



                                                                               
                                                                               
                                                                               
                                                                               
                                                                               
                                                                               



Procedures

      





 Code            Description            Performed By            Performed On   
     

 

             [id number]                                  PAP SMEAR                  
                 2012        

 

             18938                                  ROUTINE VENIPUNCTURE       
                            2012        

 

             04125                                  LIPID PANEL                
                   2012        

 

             77148                                  TSH                        
           2012        

 

             80685                                  UA LONG DIP                
                   2013        

 

             25805                                  ROUTINE VENIPUNCTURE       
                            2014        

 

             08568                                  MICRO ALBUMIN-IN HOUSE     
                              2014        

 

             33488                                  CMP                        
           2014        

 

             58685                                  CBC                        
           2014        

 

             1285403                                  GFR CALC (RESULT ONLY)   
                                2014        

 

             62757                                  LIPID PANEL                
                   2014        

 

             76262                                  MICROALBUMIN               
                    2014        

 

             65586                                  TSH                        
           2014        

 

             12899                                  A1C (RML)                  
                 2014        

 

             07382                                  ROUTINE VENIPUNCTURE       
                            2014        

 

             21224                                  CMP                        
           2014        

 

             03216                                  LIPID PANEL                
                   2014        

 

             9710886                                  GFR CALC (RESULT ONLY)   
                                2014        

 

             74000                                  CBC                        
           2014        

 

             31419                                  TSH                        
           2014        

 

             27079                                  ROUTINE VENIPUNCTURE       
                            12/15/2014        

 

             20744                                  MICRO ALBUMIN-IN HOUSE     
                              12/15/2014        

 

             74346                                  A1C (IN-HOUSE)             
                      12/15/2014        

 

             22241                                  UA LONG DIP                
                   12/15/2014        

 

             26705                                  MICROALBUMIN               
                    12/15/2014        

 

             06106                                  CBC                        
           12/15/2014        

 

             [id number]                                  GFR CALC (RESULT ONLY)   
                                12/15/2014        

 

             15577                                  CMP                        
           12/15/2014        

 

             57795                                  LIPID PANEL                
                   12/15/2014        

 

             94196                                  LIPASE                     
              12/15/2014        

 

             38462                                  TSH                        
           12/15/2014        

 

             84008                                  [facility] DIAGNOSTIC EVALUATION
                                   2014        



                                                                                



Results

      





 Test            Result            Range        









 Complete blood count (CBC) with automated white blood cell (WBC) differential 
- 16 22:02         









 Blood leukocytes automated count (number/volume)            15.1 10*3/uL      
      4.3-11.0        

 

 Blood erythrocytes automated count (number/volume)            4.57 10*6/uL    
        4.35-5.85        

 

 Venous blood hemoglobin measurement (mass/volume)            13.0 g/dL        
    11.5-16.0        

 

 Blood hematocrit (volume fraction)            38 %            35-52        

 

 Automated erythrocyte mean corpuscular volume            83 [foz_us]          
  80-99        

 

 Automated erythrocyte mean corpuscular hemoglobin (mass per erythrocyte)      
      28 pg            25-34        

 

 Automated erythrocyte mean corpuscular hemoglobin concentration measurement (
mass/volume)            34 g/dL            32-36        

 

 Automated erythrocyte distribution width ratio            13.2 %            
10.0-14.5        

 

 Automated blood platelet count (count/volume)            267 10*3/uL          
  130-400        

 

 Automated blood platelet mean volume measurement            10.2 [foz_us]     
       7.4-10.4        

 

 Automated blood neutrophils/100 leukocytes            91 %            42-75   
     

 

 Automated blood lymphocytes/100 leukocytes            7 %            12-44    
    

 

 Blood monocytes/100 leukocytes            2 %            0-12        

 

 Automated blood eosinophils/100 leukocytes            0 %            0-10     
   

 

 Automated blood basophils/100 leukocytes            0 %            0-10        

 

 Blood neutrophils automated count (number/volume)            13.7 10*3        
    1.8-7.8        

 

 Blood lymphocytes automated count (number/volume)            1.1 10*3         
   1.0-4.0        

 

 Blood monocytes automated count (number/volume)            0.3 10*3            
0.0-1.0        

 

 Automated eosinophil count            0.0 10*3/uL            0.0-0.3        

 

 Automated blood basophil count (count/volume)            0.0 10*3/uL          
  0.0-0.1        









 PT panel in platelet poor plasma by coagulation assay - 16 22:02         









 Prothrombin time (PT) in platelet poor plasma by coagulation assay            
13.7 s            12.2-14.7        

 

 INR in platelet poor plasma or blood by coagulation assay            1.1      
       0.8-1.4        









 Activated partial thromboplastin time (aPTT) in platelet poor plasma 
bycoagulation assay - 16 22:02         









 Activated partial thromboplastin time (aPTT) in platelet poor plasma 
bycoagulation assay            21 s            24-35        









 Comprehensive metabolic panel - 16 22:02         









 Serum or plasma sodium measurement (moles/volume)            130 mmol/L       
     135-145        

 

 Serum or plasma potassium measurement (moles/volume)            4.1 mmol/L    
        3.6-5.0        

 

 Serum or plasma chloride measurement (moles/volume)            98 mmol/L      
              

 

 Carbon dioxide            17 mmol/L            21-32        

 

 Serum or plasma anion gap determination (moles/volume)            15 mmol/L   
         5-14        

 

 Serum or plasma urea nitrogen measurement (mass/volume)            17 mg/dL   
         7-18        

 

 Serum or plasma creatinine measurement (mass/volume)            0.91 mg/dL    
        0.60-1.30        

 

 Serum or plasma urea nitrogen/creatinine mass ratio            19             
NRG        

 

 Serum or plasma creatinine measurement with calculation of estimated 
glomerular filtration rate            >             NRG        

 

 Serum or plasma glucose measurement (mass/volume)            357 mg/dL        
            

 

 Serum or plasma calcium measurement (mass/volume)            9.6 mg/dL        
    8.5-10.1        

 

 Serum or plasma total bilirubin measurement (mass/volume)            0.5 mg/dL
            0.1-1.0        

 

 Serum or plasma alkaline phosphatase measurement (enzymatic activity/volume)  
          130 U/L                    

 

 Serum or plasma aspartate aminotransferase measurement (enzymatic activity/
volume)            24 U/L            5-34        

 

 Serum or plasma alanine aminotransferase measurement (enzymatic activity/volume
)            34 U/L            0-55        

 

 Serum or plasma protein measurement (mass/volume)            8.0 g/dL         
   6.4-8.2        

 

 Serum or plasma albumin measurement (mass/volume)            3.7 g/dL         
   3.2-4.5        









 Magnesium - 16 22:02         









 Magnesium            1.8 mg/dL            1.8-2.4        









 Serum or plasma troponin i.cardiac measurement (mass/volume) - 16 22:02 
        









 Serum or plasma troponin i.cardiac measurement (mass/volume)            < ng/
mL            <0.30        









 Myoglobin, serum - 16 22:02         









 Myoglobin, serum            12.0 ng/mL            10.0-92.0        









 Blood manual differential performed detection - 16 22:02         









 Blood monocytes/100 leukocytes            1 %            NRG        

 

 Manual blood segmented neutrophils/100 leukocytes            90 %            
NRG        

 

 Manual blood lymphocytes/100 leukocytes            9 %            NRG        

 

 Blood erythrocyte morphology finding identification            NORMAL         
    NRG        









 Complete urinalysis with reflex to culture - 16 23:23         









 Urine color determination            YELLOW             NRG        

 

 Urine clarity determination            CLEAR             NRG        

 

 Urine pH measurement by test strip            6             5-9        

 

 Specific gravity of urine by test strip            1.015             1.016-
1.022        

 

 Urine protein assay by test strip, semi-quantitative            NEGATIVE      
       NEGATIVE        

 

 Urine glucose detection by automated test strip            4+             
NEGATIVE        

 

 Erythrocytes detection in urine sediment by light microscopy            1+    
         NEGATIVE        

 

 Urine ketones detection by automated test strip            1+             
NEGATIVE        

 

 Urine nitrite detection by test strip            NEGATIVE             NEGATIVE
        

 

 Urine total bilirubin detection by test strip            NEGATIVE             
NEGATIVE        

 

 Urine urobilinogen measurement by automated test strip (mass/volume)          
  NORMAL             NORMAL        

 

 Urine leukocyte esterase detection by dipstick            1+             
NEGATIVE        

 

 Automated urine sediment erythrocyte count by microscopy (number/high power 
field)            RARE             NRG        

 

 Automated urine sediment leukocyte count by microscopy (number/high power field
)             [HPF]            NRG        

 

 Bacteria detection in urine sediment by light microscopy            TRACE     
        NRG        

 

 Squamous epithelial cells detection in urine sediment by light microscopy     
       2-5             NRG        

 

 Crystals detection in urine sediment by light microscopy            NONE      
       NRG        

 

 Casts detection in urine sediment by light microscopy            NONE         
    NRG        

 

 Mucus detection in urine sediment by light microscopy            NEGATIVE     
        NRG        

 

 Complete urinalysis with reflex to culture            NO             NRG      
  









 Capillary blood glucose measurement by glucometer (mass/volume) - 16 00:
28         









 Capillary blood glucose measurement by glucometer (mass/volume)            343 
mg/dL                    









 CBC With Differential/Platelet - 16 11:58         









 WBC            5.3 x10E3/uL            3.4-10.8        

 

 RBC            4.18 x10E6/uL            3.77-5.28        

 

 Hemoglobin            11.8 g/dL            11.1-15.9        

 

 Hematocrit            35.6 %            34.0-46.6        

 

 MCV            85 fL            79-97        

 

 MCH            28.2 pg            26.6-33.0        

 

 MCHC            33.1 g/dL            31.5-35.7        

 

 RDW            14.0 %            12.3-15.4        

 

 Platelets            187 x10E3/uL            150-379        

 

 Neutrophils            81 %                     

 

 Lymphs            11 %                     

 

 Monocytes            6 %                     

 

 Eos            1 %                     

 

 Basos            0 %                     

 

 Neutrophils (Absolute)            4.3 x10E3/uL            1.4-7.0        

 

 Lymphs (Absolute)            0.6 x10E3/uL            0.7-3.1        

 

 Monocytes(Absolute)            0.3 x10E3/uL            0.1-0.9        

 

 Eos (Absolute)            0.0 x10E3/uL            0.0-0.4        

 

 Baso (Absolute)            0.0 x10E3/uL            0.0-0.2        

 

 Immature Granulocytes            1 %                     

 

 Immature Grans (Abs)            0.1 x10E3/uL            0.0-0.1        









 Comp. Metabolic Panel (14) - 16 11:58         









 Glucose, Serum            351 mg/dL            65-99        

 

 BUN            11 mg/dL            6-24        

 

 Creatinine, Serum            0.53 mg/dL            0.57-1.00        

 

 eGFR If NonAfricn Am            110 mL/min/1.73                >59        

 

 eGFR If Africn Am            127 mL/min/1.73                >59        

 

 BUN/Creatinine Ratio            21             9-23        

 

 Sodium, Serum            136 mmol/L            134-144        

 

 Potassium, Serum            4.4 mmol/L            3.5-5.2        

 

 Chloride, Serum            94 mmol/L                    

 

 Carbon Dioxide, Total            23 mmol/L            18-29        

 

 Calcium, Serum            9.2 mg/dL            8.7-10.2        

 

 Protein, Total, Serum            7.8 g/dL            6.0-8.5        

 

 Albumin, Serum            4.1 g/dL            3.5-5.5        

 

 Globulin, Total            3.7 g/dL            1.5-4.5        

 

 A/G Ratio            1.1             1.1-2.5        

 

 Bilirubin, Total            0.3 mg/dL            0.0-1.2        

 

 Alkaline Phosphatase, S            119 IU/L                    

 

 AST (SGOT)            18 IU/L            0-40        

 

 ALT (SGPT)            22 IU/L            0-32        









 Amylase, Serum - 16 11:58         









 Amylase, Serum            49 U/L                    









 Lipase, Serum - 16 11:58         









 Lipase, Serum            47 U/L            0-59        









 Comp. Metabolic Panel (14) - 17 11:31         









 Glucose, Serum            337 mg/dL            65-99        

 

 BUN            14 mg/dL            6-24        

 

 Creatinine, Serum            0.80 mg/dL            0.57-1.00        

 

 eGFR If NonAfricn Am            86 mL/min/1.73                >59        

 

 eGFR If Africn Am            99 mL/min/1.73                >59        

 

 BUN/Creatinine Ratio            18             9-23        

 

 Sodium, Serum            138 mmol/L            134-144        

 

 Potassium, Serum            4.4 mmol/L            3.5-5.2        

 

 Chloride, Serum            97 mmol/L                    

 

 Carbon Dioxide, Total            24 mmol/L            18-29        

 

 Calcium, Serum            9.8 mg/dL            8.7-10.2        

 

 Protein, Total, Serum            7.6 g/dL            6.0-8.5        

 

 Albumin, Serum            4.3 g/dL            3.5-5.5        

 

 Globulin, Total            3.3 g/dL            1.5-4.5        

 

 A/G Ratio            1.3             1.2-2.2        

 

 Bilirubin, Total            0.2 mg/dL            0.0-1.2        

 

 Alkaline Phosphatase, S            99 IU/L                    

 

 AST (SGOT)            23 IU/L            0-40        

 

 ALT (SGPT)            35 IU/L            0-32        









 TSH - 17 11:31         









 TSH            2.320 uIU/mL            0.450-4.500        









 Complete blood count (CBC) with automated white blood cell (WBC) differential 
- 18 04:30         









 Blood leukocytes automated count (number/volume)            5.1 10*3/uL       
     4.3-11.0        

 

 Blood erythrocytes automated count (number/volume)            4.93 10*6/uL    
        4.35-5.85        

 

 Venous blood hemoglobin measurement (mass/volume)            14.2 g/dL        
    11.5-16.0        

 

 Blood hematocrit (volume fraction)            40 %            35-52        

 

 Automated erythrocyte mean corpuscular volume            82 [foz_us]          
  80-99        

 

 Automated erythrocyte mean corpuscular hemoglobin (mass per erythrocyte)      
      29 pg            25-34        

 

 Automated erythrocyte mean corpuscular hemoglobin concentration measurement (
mass/volume)            35 g/dL            32-36        

 

 Automated erythrocyte distribution width ratio            13.4 %            
10.0-14.5        

 

 Automated blood platelet count (count/volume)            131 10*3/uL          
  130-400        

 

 Automated blood platelet mean volume measurement            10.1 [foz_us]     
       7.4-10.4        

 

 Automated blood neutrophils/100 leukocytes            90 %            42-75   
     

 

 Automated blood lymphocytes/100 leukocytes            7 %            12-44    
    

 

 Blood monocytes/100 leukocytes            3 %            0-12        

 

 Automated blood eosinophils/100 leukocytes            0 %            0-10     
   

 

 Automated blood basophils/100 leukocytes            0 %            0-10        

 

 Blood neutrophils automated count (number/volume)            4.6 10*3         
   1.8-7.8        

 

 Blood lymphocytes automated count (number/volume)            0.4 10*3         
   1.0-4.0        

 

 Blood monocytes automated count (number/volume)            0.2 10*3            
0.0-1.0        

 

 Automated eosinophil count            0.0 10*3/uL            0.0-0.3        

 

 Automated blood basophil count (count/volume)            0.0 10*3/uL          
  0.0-0.1        









 Blood manual differential performed detection - 18 04:30         









 Blood monocytes/100 leukocytes            2 %            NRG        

 

 Manual blood segmented neutrophils/100 leukocytes            80 %            
NRG        

 

 Blood band neutrophils/100 leukocytes            12 %            NRG        

 

 Manual blood lymphocytes/100 leukocytes            6 %            NRG        

 

 Manual eosinophils/100 leukocytes in nose            0 %            NRG        

 

 Manual blood basophils/100 leukocytes            0 %            NRG        

 

 Blood erythrocyte morphology finding identification            NORMAL         
    NRG        









 Comprehensive metabolic panel - 18 04:30         









 Serum or plasma sodium measurement (moles/volume)            136 mmol/L       
     135-145        

 

 Serum or plasma potassium measurement (moles/volume)            4.0 mmol/L    
        3.6-5.0        

 

 Serum or plasma chloride measurement (moles/volume)            102 mmol/L     
               

 

 Carbon dioxide            18 mmol/L            21-32        

 

 Serum or plasma anion gap determination (moles/volume)            16 mmol/L   
         5-14        

 

 Serum or plasma urea nitrogen measurement (mass/volume)            16 mg/dL   
         7-18        

 

 Serum or plasma creatinine measurement (mass/volume)            0.82 mg/dL    
        0.60-1.30        

 

 Serum or plasma urea nitrogen/creatinine mass ratio            20             
NRG        

 

 Serum or plasma creatinine measurement with calculation of estimated 
glomerular filtration rate            >             NRG        

 

 Serum or plasma glucose measurement (mass/volume)            325 mg/dL        
            

 

 Serum or plasma calcium measurement (mass/volume)            9.8 mg/dL        
    8.5-10.1        

 

 Serum or plasma total bilirubin measurement (mass/volume)            1.2 mg/dL
            0.1-1.0        

 

 Serum or plasma alkaline phosphatase measurement (enzymatic activity/volume)  
          88 U/L                    

 

 Serum or plasma aspartate aminotransferase measurement (enzymatic activity/
volume)            25 U/L            5-34        

 

 Serum or plasma alanine aminotransferase measurement (enzymatic activity/volume
)            33 U/L            0-55        

 

 Serum or plasma protein measurement (mass/volume)            8.6 g/dL         
   6.4-8.2        

 

 Serum or plasma albumin measurement (mass/volume)            4.7 g/dL         
   3.2-4.5        









 Magnesium - 18 04:30         









 Magnesium            1.9 mg/dL            1.8-2.4        









 Serum or plasma triglyceride measurement (mass/volume) - 18 04:30        
 









 Serum or plasma triglyceride measurement (mass/volume)            315 mg/dL   
         <150        









 Lipase - 18 04:30         









 Lipase            61 U/L            8-78        









 Serum or plasma C reactive protein measurement (mass/volume) - 18 04:30 
        









 Serum or plasma C reactive protein measurement (mass/volume)            1.45 mg
/dL            0.00-0.50        









 Capillary blood glucose measurement by glucometer (mass/volume) - 18 04:
34         









 Capillary blood glucose measurement by glucometer (mass/volume)            308 
mg/dL                    









 Complete urinalysis with reflex to culture - 18 05:10         









 Urine color determination            YELLOW             NRG        

 

 Urine clarity determination            CLEAR             NRG        

 

 Urine pH measurement by test strip            6             5-9        

 

 Specific gravity of urine by test strip            1.010             1.016-
1.022        

 

 Urine protein assay by test strip, semi-quantitative            1+             
NEGATIVE        

 

 Urine glucose detection by automated test strip            4+             
NEGATIVE        

 

 Erythrocytes detection in urine sediment by light microscopy            
NEGATIVE             NEGATIVE        

 

 Urine ketones detection by automated test strip            3+             
NEGATIVE        

 

 Urine nitrite detection by test strip            NEGATIVE             NEGATIVE
        

 

 Urine total bilirubin detection by test strip            NEGATIVE             
NEGATIVE        

 

 Urine urobilinogen measurement by automated test strip (mass/volume)          
  NORMAL             NORMAL        

 

 Urine leukocyte esterase detection by dipstick            NEGATIVE             
NEGATIVE        

 

 Automated urine sediment erythrocyte count by microscopy (number/high power 
field)            RARE             NRG        

 

 Automated urine sediment leukocyte count by microscopy (number/high power field
)            NONE             NRG        

 

 Bacteria detection in urine sediment by light microscopy            TRACE     
        NRG        

 

 Squamous epithelial cells detection in urine sediment by light microscopy     
       2-5             NRG        

 

 Crystals detection in urine sediment by light microscopy            NONE      
       NRG        

 

 Casts detection in urine sediment by light microscopy            NONE         
    NRG        

 

 Mucus detection in urine sediment by light microscopy            NEGATIVE     
        NRG        

 

 Complete urinalysis with reflex to culture            NO             NRG      
  









 Urine drug screening test - 18 05:10         









 Urine phencyclidine detection by screening method            NEGATIVE         
    NEGATIVE        

 

 Urine benzodiazepines detection by screening method            POSITIVE       
      NEGATIVE        

 

 Urine cocaine detection            NEGATIVE             NEGATIVE        

 

 Urine amphetamines detection by screening method            NEGATIVE          
   NEGATIVE        

 

 Urine methamphetamine detection by screening method            NEGATIVE       
      NEGATIVE        

 

 Urine cannabinoids detection by screening method            POSITIVE          
   NEGATIVE        

 

 Urine opiates detection by screening method            NEGATIVE             
NEGATIVE        

 

 Urine barbiturates detection            NEGATIVE             NEGATIVE        

 

 Screening urine tricyclic antidepressants detection            NEGATIVE       
      NEGATIVE        

 

 Urine methadone detection by screening method            NEGATIVE             
NEGATIVE        

 

 Urine oxycodone detection            NEGATIVE             NEGATIVE        

 

 Urine propoxyphene detection            NEGATIVE             NEGATIVE        









 Capillary blood glucose measurement by glucometer (mass/volume) - 18 06:
27         









 Capillary blood glucose measurement by glucometer (mass/volume)            276 
mg/dL                    









 Whole blood basic metabolic panel - 18 06:38         









 Serum or plasma sodium measurement (moles/volume)            138 mmol/L       
     135-145        

 

 Serum or plasma potassium measurement (moles/volume)            3.7 mmol/L    
        3.6-5.0        

 

 Serum or plasma chloride measurement (moles/volume)            107 mmol/L     
               

 

 Carbon dioxide            18 mmol/L            21-32        

 

 Serum or plasma anion gap determination (moles/volume)            13 mmol/L   
         5-14        

 

 Serum or plasma urea nitrogen measurement (mass/volume)            14 mg/dL   
         7-18        

 

 Serum or plasma creatinine measurement (mass/volume)            0.71 mg/dL    
        0.60-1.30        

 

 Serum or plasma urea nitrogen/creatinine mass ratio            20             
NRG        

 

 Serum or plasma creatinine measurement with calculation of estimated 
glomerular filtration rate            >             NRG        

 

 Serum or plasma glucose measurement (mass/volume)            275 mg/dL        
            

 

 Serum or plasma calcium measurement (mass/volume)            8.2 mg/dL        
    8.5-10.1        









 Capillary blood glucose measurement by glucometer (mass/volume) - 18 09:
48         









 Capillary blood glucose measurement by glucometer (mass/volume)            253 
mg/dL                    









 Methicillin resistant Staphylococcus aureus (MRSA) screening culture -  10:20         









 Methicillin resistant Staphylococcus aureus (MRSA) screening culture          
  NEG             NRG        









 Blood type T Indirect antibody screen panel - 18 10:31         









 ABO+Rh group            OP             NRG        

 

 Transfusion band number            [id number]             NRG        

 

 Blood group antibody screen            NEGATIVE             NRG        









 TSH - 10/01/18 10:07         









 TSH            4.15 mIU/L            NRG        



                                                                              



Encounters

      





 ACCT No.            Visit Date/Time            Discharge            Status    
        Pt. Type            Provider            Facility            Loc./Unit  
          Complaint        

 

 [id number]            2018 09:00:00            2018 23:59:59          
  CLS            Outpatient            ANIKET GALLARDO                  
       [facility]                     

 

 [id number]            10/01/2018 09:00:00                                      
Document Registration                                                          
  

 

 [id number]            2017 08:39:00                                   
   Document Registration                                                       
     

 

 [id number]            2015 12:49:00            2015 23:59:59          
  CLS            Outpatient            ANIKET GALLARDO                  
                             

 

 [id number]            2015 10:36:00            2015 23:59:59          
  CLS            Outpatient            EFRAIN GALLARDOA S                  
                             

 

 [id number]            2014 09:45:00            2014 23:59:59          
  CLS            Outpatient            EFRAIN GALLARDOA S                  
                             

 

 [id number]            2014 14:03:00            2014 23:59:59          
  CLS            Outpatient            MIRANDA RODRIGUEZANTHONYJUANA HORTENCIA                    
                           

 

 [id number]            10/07/2014 16:10:00            10/07/2014 23:59:59          
  CLS            Outpatient            EFRAIN GALLARDOA S                  
                             

 

 [id number]            2014 08:03:00            2014 23:59:59          
  CLS            Outpatient            EFRAIN GALLARDOA S                  
                             

 

 [id number]            2014 16:13:00            2014 23:59:59          
  CLS            Outpatient            EFRAIN GALLARDOA S                  
                             

 

 [id number]            2014 09:24:00            2014 23:59:59          
  CLS            Outpatient            EFRAIN GALLARDOA S                  
                             

 

 [id number]            2013 15:11:00            2013 23:59:59          
  CLS            Outpatient            NAVARRO CONDON MD                       
                        

 

 [id number]            2013 14:35:00            2013 23:59:59          
  CLS            Outpatient                                                    
        

 

 [id number]            2012 14:18:00            2012 23:59:59          
  CLS            Outpatient            ANIKET GALLARDO S                  
                             

 

 [id number]            2018 09:37:00            2018 15:50:00    
        DIS            Outpatient            REYNOLD MCNALLY DO            [facility] 
[facility]            SDC            PANCREATITIS ATTACK,
VOMITING,POSS FEVER        

 

 [id number]            2016 20:35:00            2016 01:54:00    
        DIS            Emergency            JOSE DAY MD            
[facility]            ER            RASH, ITCHING, 
SWELLING        

 

 [id number]            2016 22:01:00                                   
   Document Registration                                                       
     

 

 [id number]            2016 08:43:00                                   
   Document Registration

## 2019-01-30 NOTE — XMS REPORT
[facility]

 Created on: 2018



Libra Galvez

External Reference #: [id number]

: 1965

Sex: Female



Demographics







 Address  [address]  [address]

 

 Preferred Language  Unknown

 

 Marital Status  Unknown

 

 Mandaeism Affiliation  Unknown

 

 Race  Unknown

 

 Ethnic Group  Unknown





Author







 Author  MAKSIM Qureshi

 

 [facility] WALK [facility]

 

 Address  [address] [place]  [address]



 

 Phone  [phone]







Care Team Providers







 Care Team Member Name  Role  Phone

 

 MAKSIM Qureshi  Unavailable  [phone]







PROBLEMS







 Type  Condition  ICD9-CM Code  CDR62-KN Code  Onset Dates  Condition Status  
SNOMED Code

 

 Problem  Acquired hypothyroidism     E03.9     Active  306088339

 

 Problem  Diabetes     E11.9     Active  [id number]

 

 Problem  Anxiety disorder, unspecified     F41.9     Active  573170311







ALLERGIES







 Substance  Reaction  Event Type  Date  Status

 

 Sulfamethoxazole-Trimethoprim  anaphylaxis  Drug Allergy  10 Nov, 2017  Active







ENCOUNTERS







 Encounter  Location  Date  Diagnosis

 

 [facility]  [id number] [place] [address] 83889-
3575  15 May, 2018   

 

 [facility]  [id number] [place] [address] 59287-
4174     

 

 [facility]  [id number] [place] [address] 68412-
5228  20 Mar, 2018   

 

 [facility]  [id number] [place] [address] 24175-
3263     

 

 [facility]  [id number] [place] [address] 59226-
0335     

 

 [facility]  [id number] [place] [address] 83453-
4920  10 Andrei, 2018  Diabetes E11.9 and Drug-induced acute pancreatitis with 
uninfected necrosis K85.31

 

 [facility]  [id number] [place] [address] 58778-
0954  27 Dec, 2017   

 

 [facility]  [address] [place] [address] 88847-
5780     

 

 [facility]  [id number] [place] [address] [phone]
-[phone]  10 Nov, 2017  Crushing injury of right foot, initial encounter S97.81XA

 

 [facility]  [id number] [place] [address] 74624-
2775  27 Sep, 2017   

 

 [facility]  [id number] [place] [address] 64130-
9535  21 Sep, 2017   

 

 [facility]  [id number] [place] [address] [phone]
[phone]  19 Sep, 2017  Acute non-recurrent pansinusitis J01.40

 

 [facility]  [id number] [place] [address] 01424-
2631  19 Sep, 2017   

 

 [facility]  [id number] [place] [address] 02219-
6910  04 Aug, 2017   

 

 [facility]  [id number] [place] [address] 04516-
0195     

 

 [facility]  [id number] [place] [address] 21613-
9982  26 May, 2017   

 

 [facility]  [id number] [place] [address] 42503-
5272  08 May, 2017  Diabetes E11.9 ; Anxiety disorder, unspecified F41.9 and 
Acquired hypothyroidism E03.9

 

 [facility]  [id number] [place] [address] 95076-
3584  01 May, 2017   

 

 [facility]  [id number] [place] [address] 40642-
0258     

 

 [facility]  [id number] [place] [address] 47771-
4293     

 

 [facility]  [id number] [place] [address] 91777-
8371  06 Mar, 2017   

 

 [facility]  [id number] [place] [address] 27278-
9515     

 

 [facility]  [id number] [place] [address] 29940-
4132     

 

 [facility]  [address] [address]-
7363     

 

 [facility]  [id number] [place] [address] [phone]-
4874     

 

 [facility]  [id number] [place] [address] 31122-
6167    Acute non-recurrent maxillary sinusitis J01.00

 

 [facility]  [id number] [place] [address] 54219-
7676     

 

 [facility]  [id number] [place] [address] 34724-
2293     

 

 [facility]  [address] [place] [address] 77137-
7099  12 Dec, 2016   

 

 [facility]  [id number] [place] [address] 70567-
5793  15 Nov, 2016   

 

 [facility]  [id number] [place] [address] 21485-
0121  12 Oct, 2016  Diabetes E11.9

 

 [facility]  [id number] [place] [address] 76324-
0817  28 Sep, 2016  Pelvic pain R10.2 ; Leukocytosis, unspecified D72.829 ; 
Constipation, unspecified constipation type K59.00 and History of pancreatitis 
Z87.19

 

 [facility]  [id number] [place] [address] 96312-
2255  22 Sep, 2016   

 

 [facility]  [id number] [place] [address] 87767-
0091  14 Sep, 2016  Diabetes E11.9

 

 [facility]  [id number] [place] [address] 76734-
1190  13 Sep, 2016   

 

 [facility]  [id number] [place] [address] 54712-
8316  09 Sep, 2016  Vaginal yeast infection B37.3

 

 [facility]  [address] [address]-
4109  08 Sep, 2016   

 

 [facility]  [address] [place] [address] 99987-
7286  30 Aug, 2016  Diabetes E11.9

 

 [facility]  [address] [address]-
7993  25 Aug, 2016  Anxiety disorder, unspecified F41.9

 

 [facility]  [id number] [place] [address] 09178-
3498  17 Aug, 2016  Vaginal yeast infection B37.3

 

 [facility]  [address] [place] [address] 54928-
7550  17 Aug, 2016   

 

 [facility]  [id number] [place] [address] 99475-
2699    Anxiety disorder, unspecified F41.9

 

 [facility]  [id number] [place] [address] 99321-
4228    Vaginal yeast infection B37.3

 

 [facility]  [address] [address]-
8737    Anxiety disorder, unspecified F41.9

 

 [facility]  [address], [place] [address]-
2562    Anxiety disorder, unspecified F41.9

 

 [facility]  [address] [address]-
8892  06 May, 2016  Anxiety disorder, unspecified F41.9

 

 [facility]  [address] [address]-
9939  04 May, 2016  Diabetes E11.9

 

 [facility]  [address], [place] [address]-
4393    Anxiety disorder, unspecified F41.9

 

 [facility]  [address], [place] [address]-
3054     

 

 [facility]  [address] [place] [address] 57240-
0139  25 Mar, 2016  Vaginal yeast infection B37.3

 

 [facility]  [id number] [place] [address] 17685-
7166  15 Mar, 2016   

 

 [facility]  [id number] [place] [address] 00424-
2579     

 

 [facility]  [address] [place] [address] 45468-
2159     

 

 [facility]  [address] [place] [address] 68881-
6035     

 

 [facility]  [address] [place] [address] 18609-
1924  15 Andrei, 2016   

 

 [facility]  [address] [place] [address] 97849-
0417  22 Dec, 2015   

 

 [facility]  [id number] [place] [address] 54093-
9143    Diabetes E11.9 ; Acquired hypothyroidism E03.9 ; 
Hyperlipidemia, unspecified hyperlipidemia E78.5 and Anxiety F41.9

 

 [facility]  [id number] [place] [address] 80146-
6313     

 

 [facility]  [address] [place] [address] 17737-
2954  27 Oct, 2015   

 

 [facility]  [address] [place] [address] 63370-
4430  29 Sep, 2015   

 

 [facility]  [address] [place] [address] 52286-
0919  01 Sep, 2015   

 

 [facility]  [address] [place] [address] 212771-
4628  04 Aug, 2015   

 

 [facility]  [address] [place] [address] [phone]-
1384  15 Jul, 2015   

 

 [facility]  [id number] [place] [address] 15920-
1110    DUB (dysfunctional uterine bleeding) 626.8 and Pap test, as 
part of routine gynecological examination V76.2

 

 [facility]  [address], [place] [address]-
6876     

 

 [facility]  [address], [place] [address]-
2546     

 

 [facility]  [address] [place] [address] 21256-
3986     

 

 [facility]  [address] [place] [address] 65923-
7026     

 

 [facility]  [address] [place] [address] 77258-
0712  15 Eagle, 2015  Diabetes 250.00

 

 [facility]  [address] [place] [address] 85086-
9996     

 

 [facility]  [address] [place] [address] 96566-
6166  19 May, 2015   

 

 [facility]  [address] [place] [address] 84282-
8724  13 May, 2015  Diabetes 250.00

 

 [facility]  [address] [place] [address] 11134-
5381  12 May, 2015   

 

 [facility]  [address], [place] [address]-
1496  07 May, 2015   

 

 [facility]  [address] [address]-
6646  07 May, 2015   

 

 [facility]  [address] [place] [address]-
9066  05 May, 2015   

 

 [facility]  [address] [place] [address]-
2546  01 May, 2015   

 

 [facility]  [address], [place] [address]-
1496     

 

 [facility]  [address] [place] [address] 40837-
3245  14 2015   

 

 [facility] [place] [facility]  [address] [id number] [place] [address]-
1502     

 

 [facility] [place] [facility]  [address] [id number] [place] [address]-
1366  17 Mar, 2015   

 

 [facility] [place] [facility]  [address] [facility] [id number] [place] [address]-
1934  17 Mar, 2015   

 

 [facility] [place] [facility]  [address] [id number] [place] [address]-
7903  17 Mar, 2015   

 

 [facility] [place] [facility]  [address] [id number] [place] [address]-
4285  17 Mar, 2015   

 

 [facility] [place] [facility]  [address] [facility] [id number] [place] [address]-
8114  09 Mar, 2015   

 

 [facility] [place] [facility]  [address] [facility] [id number] [place] [address]-
0304  09 Mar, 2015   

 

 [facility] [place] [facility]  [address] [facility] [id number] [place] [address]-
2112  06 Mar, 2015   

 

 [facility] [place] [facility]  [address] [id number] [place] [address]-
1160  03 Mar, 2015   

 

 [facility] [place] [facility]  [address] [facility] [id number] [place] [address]-
1172  03 Mar, 2015   

 

 [facility] [place] [facility]  [address] [id number] [address]-
4489  ,    

 

 [facility] [place] [facility]  [address] [id number][place] [address]-
1688  ,    

 

 [facility] [place] [facility]  [address] [id number] [place] [address]-
8835  ,    

 

 [facility] [place] [facility]  [address] [id number] [address]-
5407  ,    

 

 [facility] [place] [facility]  [address] [facility] [id number] [address]-
7832     

 

 [facility] [place] [facility]  [address] [id number] [place] [address]-
2681     

 

 [facility] [place] [facility]  [address] [id number] [place] [address]-
6320     

 

 [facility] [place] [facility]  [address] [id number] [place] [address]-
5264     

 

 [facility] [place] [facility]  [address] [id number] [place] [address]-
2560     

 

 [facility] [place] [facility]  [address] [id number] [place] [address]-
6112     

 

 [facility] [place] [facility]  [address] [id number] [place] [address]-
4806     

 

 [facility] [place] [facility]  [address] [id number] [place] [address]-
9615     

 

 [facility] [place] [facility]  [address] [id number] [place] [address]-
9384     

 

 [facility] [place] [facility]  [address] [id number] [place] [address]-
1253     

 

 [facility] [place] [facility]  [address] [id number] [place] [address]-
7515     

 

 [facility] [place] [facility]  [address] [id number] [place] [address]-
7016     

 

 [facility] [place] [facility]  [address] [id number][place] [address]-
2690     

 

 [facility] [place] [facility]  [address] [id number] [place] [address]-
3722     

 

 [facility] [place] [facility]  [address] [id number] [place] [address]-
8371     

 

 [facility] [place] [facility]  [address] [id number] [place] [address]-
3757     

 

 [facility] [place] [facility]  [address] [id number] [place] [address]-
9902  23 Dec, 2014   

 

 [facility] [place] [facility]  [address] [id number] [place] [address]-
9021  23 Dec, 2014   

 

 [facility] [place] [facility]  [address] [id number] [place] [address]-
6446  22 Dec, 2014   

 

 [facility] [place] [facility]  [address] [id number] [place]
0056  22 Dec, 2014   

 

 [facility] [place] [facility]  [address] [id number] [place]
1106  17 Dec, 2014   

 

 [facility] [place] [facility]  [address] [id number] [place]-
9728  17 Dec, 2014   

 

 [facility] [place] [facility]  [address] [id number] [place] [address]-
5745  15 Dec, 2014   

 

 [facility] [place] [facility]  [address] [id number] [place] [address]-
6425  15 Dec, 2014   

 

 [facility] [place] [facility]  [address] [id number] [place] [address]-
6538  12 Dec, 2014   

 

 [facility] [place] [facility]  [address] [id number] [place] [address]-
3788  08 Dec, 2014   

 

 [facility] [place] [facility]  [address] [id number] [place] [address]-
3288  08 Dec, 2014   

 

 [facility] [place] [facility]  [address] [id number] [place] [address]-
9049  08 Dec, 2014   

 

 [facility] [place] [facility]  [address] [id number] [place] [address]-
0352  08 Dec, 2014   

 

 [facility] [place] [facility]  [address] [id number] [place] [address]-
5878     

 

 [facility] [place] [facility]  [address] [id number] [place]-
4676     

 

 [facility] [place] [facility]  [address] [id number] [place] [address]-
9294     

 

 [facility] [place] [facility]  [address] [id number] [place] [address]-
8597     

 

 [facility] [place] [facility]  [address] [id number] [place] [address]-
9208  10 Nov, 2014   

 

 [facility] [place] [facility]  [address] [id number] [place] [address]-
5104  10 Nov, 2014   

 

 [facility] [place] [facility]  [address] [id number] [place] [address]-
6473  07 Oct, 2014   

 

 [facility] [place] [facility]  [address] [id number] [place] [address]-
9313  07 Oct, 2014   

 

 [facility] [place] [facility]  [address] [id number] [place] [address]-
6772  01 Oct, 2014   

 

 [facility] [place] [facility]  [address] [id number] [place] [address]-
1118  01 Oct, 2014   

 

 [facility] [place] [facility]  [address] [id number] [place] [address]-
6462  24 Sep, 2014   

 

 [facility] [place] [facility]  [address] [id number] [place] [address]-
7762  24 Sep, 2014   

 

 [facility] [place] [facility]  [address] [id number] [place] [address]-
5120  23 Sep, 2014   

 

 [facility] [place] [facility]  [address] [id number] [place] [address]-
3626  23 Sep, 2014   

 

 [facility] [place] [facility]  [address] [id number] [place] [address]-
6561  22 Sep, 2014   

 

 [facility] [place] [facility]  [address] [id number] [place] [address]-
3729  22 Sep, 2014   

 

 [facility] [place] [facility]  [address] [id number] [place] [address]-
4961  19 Aug, 2014   

 

 [facility] [place] [facility]  [address] [id number] [place] [address]-
6831  19 Aug, 2014   

 

 [facility] [place] [facility]  [address] [id number] [place] [address]-
6026     

 

 [facility] [place] [facility]  [address] [id number] [place] [address]-
8400     

 

 [facility] [place] [facility]  [address] [id number] [place] [address]-
9618  10 Eagle, 2014   

 

 [facility] [place] [facility]  [address] [id number] [place] [address]-
3441     

 

 [facility] [place] [facility]  [address] [id number] [place] [address]-
2205     

 

 [facility] [place] [facility]  [address] [id number] [place] [address]-
5537  07 May, 2014   

 

 [facility] [place] [facility]  [address] [id number] [place] [address]-
3551  07 May, 2014   

 

 [facility] [place] [facility]  [address] [id number] [place] [address]-
8641     

 

 [facility] [place] [facility]  [address] [id number] [place] [address]-
3291     

 

 [facility] [place] [facility]  [address] [id number] [place] [address]-
5701     

 

 [facility] [place] [facility]  [address] [id number] [place] [address]-
0660     

 

 [facility] [place] [facility]  [address] [id number] [place] [address]-
2558     

 

 [facility] [place] [facility]  [address] [id number] [address]-
5085     

 

 [facility] [place] [facility]  [address] [id number] [place] [address]-
9075     

 

 [facility] [place] [facility]  [address] [id number] [place] [address]-
4174     

 

 [facility] [place] [facility]  [address] [id number] [place] [address]-
2776     

 

 [facility] [place] [facility]  [address] [id number] [place] [address]
2542     

 

 [facility] [place] [facility]  [address] [id number] [place] [address]-
6143  17 Mar, 2014   

 

 [facility] [place] [facility]  [address] [id number] [place] [address]-
8746  17 Mar, 2014   

 

 [facility] [place] [facility]  [address] [id number] [place] [address]-
2546     

 

 [facility] [place] [facility]  [address] [id number] [place] [address]-
2549     

 

 [facility] [place] [facility]  [address] [id number] [place] [address]-
0691     

 

 [facility] [place] [facility]  [address] [id number] [place] [address]-
1813     

 

 [facility] [place] [facility]  [address] [id number] [place] [address]-
7760  25 Oct, 2013   

 

 [facility] [place] [facility]  [address] [id number] [place] [address]-
1312  25 Oct, 2013   

 

 [facility] [place] [facility]  [address] [id number] [place] [address]-
2731  23 Sep, 2013   

 

 [facility] [place] [facility]  [address] [id number] [place] [address]-
7841  06 Aug, 2013   

 

 [facility] [place] [facility]  [address] [id number] [place] [address]-
2546  05 Aug, 2013   

 

 [facility] [place] [facility]  [address] [id number] [place] [address]-
2546     

 

 [facility] [place] [facility]  [address] [id number] [place] [address]-
5191     

 

 [facility] [place] [facility]  [address] [id number] [place] [address]-
2546     

 

 [facility] [place] [facility]  [address] [id number] [place] [address]-
1340  28 Dec, 2012   

 

 [facility] [place] [facility]  [address] [id number] [place] [address]-
0286  19 Dec, 2012   

 

 [facility] [place] [facility]  [address] [id number] [place]
2545  19 Dec, 2012   

 

 [facility] [place] [facility]  [address] [id number] [place] [address]-
2050  13 Dec, 2012   

 

 [facility] [place] [facility]  [address] [id number] [place] [address], [place] [address]-
3032  13 Dec, 2012   

 

 [facility] [place] [facility]  [address] [id number] [place] [address]-
5394     

 

 [facility] [place] [facility]  [address] [id number] [place] [address]-
7813     

 

 [facility] [place] [facility]  [address] [id number] [place] [address]-
9673  19 Oct, 2012   

 

 [facility] [place] [facility]  [address] [id number] [place] [address]-
7575  18 Oct, 2012   

 

 [facility] [place] [facility]  [address] [id number] [place] [address]-
0833  17 Oct, 2012   

 

 [facility] [place] [facility]  [address] [id number] [place] [address]-
1789  17 Oct, 2012   

 

 [facility] [place] [facility]  [address] [facility] [id number] [place] [address]-
1156  16 Oct, 2012   

 

 [facility] [place] [facility]  [address] [id number] [address]-
1525  16 Oct, 2012   

 

 [facility] [place] [facility]  [address] [id number] [place] [address]-
1500  15 Oct, 2012   

 

 [facility] [place] [facility]  [address] [id number] [place] [address]-
6055  27 Sep, 2012   

 

 [facility] [place] [facility]  [address] [id number] [place] [address]-
6323  26 Sep, 2012   

 

 [facility] [place] [facility]  [address] [id number][place] [address]-
1841     

 

 [facility] [place] [facility]  [address] [id number] [place] [address]-
5379     

 

 [facility] [place] [facility]  [address] [id number] [place] [address]-
1853     

 

 [facility] [place] [facility]  [address] [id number] [place] [address]-
1659     

 

 [facility] [place] [facility]  [address] [id number] [place] [address]-
6127     

 

 [facility] [place] [facility]  [address] [id number] [place] [address]-
3199     

 

 [facility] [place] [facility]  [address] [id number] [place] [address]-
5166     

 

 [facility] [place] [facility]  [address] [id number] [place] [address]-
3146     

 

 [facility] [place] [facility]  [address] [id number] [place] [address]-
8876  01 Mar, 2012   

 

 [facility] [place] [facility]  [address] [id number] [place] [address]-
0134     

 

 [facility] [place] [facility]  [address] [id number] [place] [address]-
8120     

 

 [facility] [place] [facility]  [address] [id number] [place] [address]-
1532     

 

 [facility] [place] [facility]  [address] [id number] [place] [address]-
0006  10 Andrei, 2012   

 

 [facility] [place] [facility]  [address] [id number] [place] [address]-
1841     

 

 [facility] [place] [facility]  [address] [id number] [place] [address]-
9904  20 Dec, 2011   

 

 [facility] [place] [facility]  [address] [id number] [place] [address]-
4338  20 Dec, 2011   

 

 [facility] [place] [facility]  [address] [id number], [place] [address]-
9836  13 Dec, 2011   

 

 [facility]  [address] [facility] [id number] [address]-
1268  08 Dec, 2011   

 

 [facility]  [address] [facility] [id number] [place] [address]
6  08 Dec, 2011   

 

 [facility]  [address] [id number] [address]
5046  06 Dec, 2011   

 

 [facility]  [address] [id number] [place] [address]
2121  10 Oct, 2011   

 

 [facility]  [address] [id number] [place] [address]-
8004  10 Oct, 2011   

 

 [facility]  [address] [id number] [address]
7802     

 

 [facility]  [id number], [place] [address]
0331  20 Dec, 2010   

 

 [facility]  [id number] [address]-
4682  20 Dec, 2010   

 

 [facility]  [id number] [address]-
6690     

 

 [facility]  [address] [id number], [place] [address]
7494     

 

 [facility]  [id number], [place] [address]
0174  15 Oct, 2009   

 

 [facility]  [address] [id number] [address]
4715  15 Oct, 2009   

 

 [facility]  [address] [id number] [address]
3553     







IMMUNIZATIONS

No Known Immunizations



SOCIAL HISTORY

Never Assessed



REASON FOR VISIT

slammed fot in the door on  Shon



PLAN OF CARE







 Activity  Details









  









 Follow Up  prn Reason:







VITAL SIGNS







 Height  62 in  2017-11-10

 

 Weight  146.6 lbs  2017-11-10

 

 Temperature  98.5 degrees Fahrenheit  2017-11-10

 

 Heart Rate  80 bpm  2017-11-10

 

 Respiratory Rate  20   2017-11-10

 

 BMI  26.81 kg/m2  2017-11-10

 

 Blood pressure systolic  106 mmHg  2017-11-10

 

 Blood pressure diastolic  80 mmHg  2017-11-10







MEDICATIONS







 Medication  Instructions  Dosage  Frequency  Start Date  End Date  Duration  
Status

 

 Cinnamon 500 MG  Orally 2 times a day  2 capsules  12h           Active

 

 Ibuprofen 800 MG  Orally Once a day  1 tablet  24h           Active

 

 Dae Contour Test -  MZR89-C48.9 2 times a day- 3 times weekly.  test blood 
sugar             Active

 

 Xanax 1 MG  Orally Once a day  1 tablet  24h       28 days  Active

 

 Potassium 99 MG  Orally Once a day  1 tablet  24h           Active

 

 Glucocard Expression Monitor w/Device     as directed     08 May, 2017        
Active

 

 Amaryl 2 MG     TAKE TWO TABLETS BY MOUTH TWICE DAILY           90  Active

 

 MetFORMIN HCl  MG     TAKE TWO TABLETS BY MOUTH TWICE DAILY WITH MEALS.  
MUST BE TEVA BRAND           30  Active







RESULTS







 Name  Result  Date  Reference Range

 

 Xray : Foot, Right 3 views (IN HOUSE)     2017-11-10   







PROCEDURES







 Procedure  Date Ordered  Result  Body Site

 

 X-RAY EXAM OF FOOT  Nov 10, 2017      







INSTRUCTIONS





MEDICATIONS ADMINISTERED

No Known Medications



MEDICAL (GENERAL) HISTORY







 Type  Description  Date

 

 Medical History  hypothyroidism   

 

 Medical History  type II diabetes   

 

 Medical History  back pain   

 

 Medical History  hyperlipidemia   

 

 Medical History  anxiety   

 

 Medical History  mood disorder   

 

 Medical History  heart murmur   

 

 Medical History  chronic bronchitis   

 

 Medical History  [place]   

 

 Surgical History  appendectomy   

 

 Hospitalization History  childbirth   

 

 Hospitalization History  pancreatitis  2005

 

 Hospitalization History  appendectomy   

 

 Hospitalization History  Anaphylaxis to Bactrim

## 2019-01-30 NOTE — XMS REPORT
[facility]

 Created on: 2015



Libra Galvez

External Reference #: [id number]

: 1965

Sex: Female



Demographics







 Address  [address]  [address]

 

 Home Phone  [phone]

 

 Preferred Language  Unknown

 

 Marital Status  Unknown

 

 Faith Affiliation  Unknown

 

 Race  White

 

 Ethnic Group  Not  or 





Author







 Author  ANIKET MAYFIELD

 

 Organization  [employer]

 

 Address  Unknown

 

 Phone  Unavailable







Care Team Providers







 Care Team Member Name  Role  Phone

 

 ANIKET MAYFIELD  CP  Unavailable



                                                                



Allergies

          No Known Allergies                                                   
                                     



Problems

          





 Problem Type  Condition  ICD-9 Code  Onset Dates  Condition Status

 

 Problem  Pure hyperglyceridemia  272.1     Active

 

 Problem  Dysuria  788.1     Active

 

 Problem  Other bursitis disorders  727.3     Active

 

 Problem  Diabetes  250.00     Active

 

 Problem  Unspecified episodic mood disorder  296.90     Active

 

 Problem  Anxiety state, unspecified  300.00     Active

 

 Problem  Lumbago  724.2     Active

 

 Problem  Other chronic nonalcoholic liver disease  571.8     Active



                                                                               
                                                                               



Medications

          





 Medication  Code System  Code  Instructions  Start Date  End Date  Status  
Dosage

 

 Xanax  NDC  [address]-16  1 MG Orally Once a day  May 12, 2015        1 tablet



                                                                              



Results

          No Known Results                                                     
               



Summary Purpose

          [employer] Submission

## 2019-01-30 NOTE — ED GI
General


Chief Complaint:  Abdominal/GI Problems


Stated Complaint:  N/V


Nursing Triage Note:  


pt presents to er with complaint of abd pain, n/v, and back/kidney pain. states 


it feels like when she had dka back in the spring. pt states she woke up at 3 

am 


with symptoms.


Sepsis Screen:  No Definite Risk


Source of Information:  Patient


Exam Limitations:  No Limitations





History of Present Illness


Date Seen by Provider:  2019


Time Seen by Provider:  11:34


Initial Comments


To ER with abdominal pain nausea and vomiting as well as back and kidney pain 

that awakened her from sleep at 3 AM today. She is diabetic controlled with 

metformin and also smokes marijuana daily in the evening to help control her 

diabetes. No fevers or chills. She's had diarrhea chronically since her 

cholecystectomy last year.


Timing/Duration:  12-24 Hours


Severity/Quality:  Moderate


Location:  Generalized Abdomen


Radiation:  No Radiation


Activities at Onset:  None


Associated Symptoms:  Nausea/Vomiting





Allergies and Home Medications


Allergies


Coded Allergies:  


     sulfamethoxazole (Verified  Allergy, Intermediate, HIVES, 16)


     trimethoprim (Verified  Allergy, Intermediate, HIVES, 16)





Home Medications


Hydrocodone Bit/Acetaminophen 1 Tab Tab, 1 TAB PO Q8H PRN


   Prescribed by: REYNOLD MCNALLY on 18 1214


Prednisone 10 Mg Tab, 10 MG PO BID


   Prescribed by: JOSE ALFARO on 16 0149





Patient Home Medication List


Home Medication List Reviewed:  Yes





Review of Systems


Review of Systems


Constitutional:  see HPI


EENTM:  No Symptoms Reported


Respiratory:  No Symptoms Reported


Cardiovascular:  No Symptoms Reported


Gastrointestinal:  See HPI, Abdominal Pain, Nausea, Vomiting


Genitourinary:  No Symptoms Reported


Musculoskeletal:  no symptoms reported


Skin:  no symptoms reported


Psychiatric/Neurological:  No Symptoms Reported


Endocrine:  No Symptoms Reported


Hematologic/Lymphatic:  No Symptoms Reported





Past Medical-Social-Family Hx


Patient Social History


Alcohol Use:  Past History


Recreational Drug Use:  Yes


Drug of Choice:  marijuana


Smoking Status:  Never a Smoker


2nd Hand Smoke Exposure:  No


Recent Foreign Travel:  No


Contact w/Someone Who Travel:  No


Recent Infectious Disease Expo:  No


Recent Hopitalizations:  No





Immunizations Up To Date


Tetanus Booster (TDap):  Unknown


PED Vaccines UTD:  Yes





Seasonal Allergies


Seasonal Allergies:  No





Past Medical History


Surgeries:  Yes


Appendectomy


Respiratory:  No


Cardiac:  No


Neurological:  No


Reproductive Disorders:  No


Genitourinary:  No


Gastrointestinal:  Yes (choly /)


Pancreatitis


Musculoskeletal:  No


Endocrine:  Yes


Diabetes, Non-Insulin dep


HEENT:  No


Cancer:  No


Psychosocial:  Yes


Anxiety


Integumentary:  No


Blood Disorders:  No





Family Medical History


Cancer, Diabetes, Hypertension





Physical Exam


Vital Signs





Vital Signs - First Documented








 19





 11:10


 


Temp 97.1


 


Pulse 100


 


Resp 20


 


B/P (MAP) 125/86 (99)


 


Pulse Ox 100


 


O2 Delivery Room Air





Capillary Refill : Less Than 3 Seconds


Height/Weight/BMI


Height: 5'2.00"


Weight: 135lbs. 0.0oz. 61.523280qk; 22.7 BMI


Method:Stated


General Appearance:  WD/WN, no apparent distress


HEENT:  PERRL/EOMI, normal ENT inspection


Respiratory:  normal breath sounds, no respiratory distress, no accessory 

muscle use


Cardiovascular:  regular rate, rhythm


Gastrointestinal:  normal bowel sounds, soft, tenderness


Extremities:  normal range of motion, non-tender


Neurologic/Psychiatric:  alert, normal mood/affect, oriented x 3


Skin:  normal color, warm/dry





Progress/Results/Core Measures


Results/Orders


Lab Results





Laboratory Tests








Test


 19


11:10 19


11:20 19


11:24 Range/Units


 


 


Beta-Hydroxybutyrate (Chem


panel) 0.49 H


 


 


 0.00-0.27


MMOL/L


 


White Blood Count


 


 6.4 


 


 4.3-11.0


10^3/uL


 


Red Blood Count


 


 4.93 


 


 4.35-5.85


10^6/uL


 


Hemoglobin  13.9   11.5-16.0  G/DL


 


Hematocrit  41   35-52  %


 


Mean Corpuscular Volume  83   80-99  FL


 


Mean Corpuscular Hemoglobin  28   25-34  PG


 


Mean Corpuscular Hemoglobin


Concent 


 34 


 


 32-36  G/DL





 


Red Cell Distribution Width  13.7   10.0-14.5  %


 


Platelet Count


 


 115 L


 


 130-400


10^3/uL


 


Mean Platelet Volume  10.2   7.4-10.4  FL


 


Neutrophils (%) (Auto)  94 H  42-75  %


 


Lymphocytes (%) (Auto)  3 L  12-44  %


 


Monocytes (%) (Auto)  3   0-12  %


 


Eosinophils (%) (Auto)  0   0-10  %


 


Basophils (%) (Auto)  0   0-10  %


 


Neutrophils # (Auto)  6.0   1.8-7.8  X 10^3


 


Lymphocytes # (Auto)  0.2 L  1.0-4.0  X 10^3


 


Monocytes # (Auto)  0.2   0.0-1.0  X 10^3


 


Eosinophils # (Auto)


 


 0.0 


 


 0.0-0.3


10^3/uL


 


Basophils # (Auto)


 


 0.0 


 


 0.0-0.1


10^3/uL


 


Neutrophils % (Manual)  78    %


 


Lymphocytes % (Manual)  4    %


 


Monocytes % (Manual)  0    %


 


Eosinophils % (Manual)  0    %


 


Basophils % (Manual)  1    %


 


Band Neutrophils  17    %


 


Blood Morphology Comment  NORMAL    


 


Urine Color  YELLOW    


 


Urine Clarity  CLEAR    


 


Urine pH  5   5-9  


 


Urine Specific Gravity  1.020   1.016-1.022  


 


Urine Protein  1+ H  NEGATIVE  


 


Urine Glucose (UA)  2+ H  NEGATIVE  


 


Urine Ketones  2+ H  NEGATIVE  


 


Urine Nitrite  NEGATIVE   NEGATIVE  


 


Urine Bilirubin  NEGATIVE   NEGATIVE  


 


Urine Urobilinogen  NORMAL   NORMAL  MG/DL


 


Urine Leukocyte Esterase  1+ H  NEGATIVE  


 


Urine RBC (Auto)  NEGATIVE   NEGATIVE  


 


Urine RBC  NONE    /HPF


 


Urine WBC  5-10 H   /HPF


 


Urine Squamous Epithelial


Cells 


 10-25 H


 


  /HPF





 


Urine Crystals  NONE    /LPF


 


Urine Bacteria  TRACE    /HPF


 


Urine Casts  NONE    /LPF


 


Urine Mucus  NEGATIVE    /LPF


 


Urine Culture Indicated  YES    


 


Sodium Level  135   135-145  MMOL/L


 


Potassium Level  3.6   3.6-5.0  MMOL/L


 


Chloride Level  99     MMOL/L


 


Carbon Dioxide Level  21   21-32  MMOL/L


 


Anion Gap  15 H  5-14  MMOL/L


 


Blood Urea Nitrogen  20 H  7-18  MG/DL


 


Creatinine


 


 0.85 


 


 0.60-1.30


MG/DL


 


Estimat Glomerular Filtration


Rate 


 > 60 


 


  





 


BUN/Creatinine Ratio  24    


 


Glucose Level  226 H    MG/DL


 


Calcium Level  9.8   8.5-10.1  MG/DL


 


Corrected Calcium     8.5-10.1  MG/DL


 


Total Bilirubin  0.8   0.1-1.0  MG/DL


 


Aspartate Amino Transf


(AST/SGOT) 


 33 


 


 5-34  U/L





 


Alanine Aminotransferase


(ALT/SGPT) 


 31 


 


 0-55  U/L





 


Alkaline Phosphatase  100     U/L


 


Total Protein  9.2 H  6.4-8.2  GM/DL


 


Albumin  5.1 H  3.2-4.5  GM/DL


 


Lipase  38   8-78  U/L


 


Urine Opiates Screen  NEGATIVE   NEGATIVE  


 


Urine Oxycodone Screen  NEGATIVE   NEGATIVE  


 


Urine Methadone Screen  NEGATIVE   NEGATIVE  


 


Urine Propoxyphene Screen  NEGATIVE   NEGATIVE  


 


Urine Barbiturates Screen  NEGATIVE   NEGATIVE  


 


Ur Tricyclic Antidepressants


Screen 


 NEGATIVE 


 


 NEGATIVE  





 


Urine Phencyclidine Screen  NEGATIVE   NEGATIVE  


 


Urine Amphetamines Screen  NEGATIVE   NEGATIVE  


 


Urine Methamphetamines Screen  NEGATIVE   NEGATIVE  


 


Urine Benzodiazepines Screen  NEGATIVE   NEGATIVE  


 


Urine Cocaine Screen  NEGATIVE   NEGATIVE  


 


Urine Cannabinoids Screen  POSITIVE H  NEGATIVE  


 


Glucometer   228 H   MG/DL








My Orders





Orders - ROYER BENSON


Cbc With Automated Diff (19 11:26)


Comprehensive Metabolic Panel (19 11:26)


Iv Heplock-Insert (Order) (19 11:26)


Lipase (19 11:26)


Ua Culture If Indicated (19 11:26)


Ns Iv 1000 Ml (Sodium Chloride 0.9%) (19 11:30)


Drug Screen Stat (Urine) (19 11:27)


Ondansetron Injection (Zofran Injectio (19 11:30)


Beta Hydroxybutyrate (19 11:32)


Ns (Ivpb) (Sodium Chloride 0.9% Ivpb Bag (19 11:45)


Iohexol Injection (Omnipaque 350 Mg/Ml 1 (19 11:45)


Contrast Received (Contrast Received) (19 11:45)


Urine Culture (19 11:20)


Manual Differential (19 11:20)


Haloperidol Injection (Haldol Injectio (19 12:15)


Diphenhydramine Injection (Benadryl Inje (19 12:15)


Ct Abdomen/Pelvis W (19 12:19)


Ns Iv 1000 Ml (Sodium Chloride 0.9%) (19 12:20)


Fentanyl  Injection (Sublimaze Injection (19 13:00)


Lorazepam Injection (Ativan Injection) (19 13:00)





Medications Given in ED





Current Medications








 Medications  Dose


 Ordered  Sig/Olivia


 Route  Start Time


 Stop Time Status Last Admin


Dose Admin


 


 Diphenhydramine


 HCl  25 mg  ONCE  ONCE


 IVP  19 12:15


 19 12:16 DC 19 12:28


25 MG


 


 Fentanyl Citrate  50 mcg  ONCE  ONCE


 IVP  19 13:00


 1/30/19 13:01 DC 19 13:00


50 MCG


 


 Haloperidol


 Lactate  3 mg  ONCE  ONCE


 IV  19 12:15


 19 12:16 DC 19 12:28


3 MG


 


 Iohexol  100 ml  ONCE  ONCE


 IV  19 11:45


 19 11:50 DC 19 12:32


100 ML


 


 Lorazepam  1 mg  ONCE  PRN


 IVP  19 13:00


    19 13:00


1 MG


 


 Ondansetron HCl  8 mg  ONCE  ONCE


 IVP  19 11:30


 19 11:31 DC 19 11:35


8 MG


 


 Sodium Chloride  100 ml  ONCE  ONCE


 IV  19 11:45


 19 11:50 DC 19 12:32


80 ML








Vital Signs/I&O











 19





 11:10


 


Temp 97.1


 


Pulse 100


 


Resp 20


 


B/P (MAP) 125/86 (99)


 


Pulse Ox 100


 


O2 Delivery Room Air














Blood Pressure Mean:  99











Diagnostic Imaging





   Diagonstic Imaging:  CT


Comments


PT STATUS:   REG ER


:   1965


PHYSICIAN:   ROYER BENSON APRN


ADMIT DATE:   19/ER


 ***Draft***


Date of Exam:19





CT ABDOMEN/PELVIS W








PROCEDURE: CT abdomen and pelvis with contrast.





TECHNIQUE: Multiple contiguous axial images were obtained through


the abdomen and pelvis after administration of intravenous


contrast. 





INDICATION:  Nausea and vomiting. Upper abdominal pain. 





COMPARISON: Right upper quadrant ultrasound 2018.





FINDINGS: Lung bases are clear. Cholecystectomy. Reported


appendectomy. Indeterminate enhancing lesion in the posterior


right hepatic lobe near the dome measuring up 1.4 x 1.3 cm. There


is a similar lesion in the inferior right hepatic lobe measuring


0.9 x 1.0 cm. The pancreas, spleen, adrenals, kidneys, collecting


systems and unopacified bladder are negative. Low-attenuation


region in the left ovary measures up 1.7 cm and may represent


ovarian cyst. There are inflammatory changes in the mesentery and


retroperitoneum extending from the duodenum into the right colic


gutter about the cecum. No fluid collections. No free


intraperitoneal air. No lymphadenopathy. Mild fluid-filled


distention of most of the small bowel. No evidence of bowel


obstruction. No bowel wall thickening.





IMPRESSION:





1. Mild fluid-filled distention of most of the small bowel. No


evidence of bowel obstruction. There are also mild inflammatory


changes in the mesentery extending from region of the duodenum to


the right colic gutter. No fluid collections or evidence of bowel


obstruction. Findings most likely represent a nonspecific


enteritis versus mesenteric panniculitis.





2. There are two indeterminate enhancing lesions in the right


hepatic lobe. The appearance is most compatible with benign


hemangiomas but is indeterminate on this single phase exam.


Additionally, these lesions were not specifically identified on


the prior ultrasound. These could be better evaluated with


nonemergent dedicated multiphase CT.





  Dictated on workstation # [id number]








Dict:   19 [id number]


Trans:   19 1307


ANDREW [id number]





Interpreted by:     ARMANDO VEGA MD


Electronically signed by:





Departure


Impression





 Primary Impression:  


 Nausea and vomiting


 Qualified Codes:  R11.2 - Nausea with vomiting, unspecified


 Additional Impressions:  


 Abdominal pain


 Qualified Codes:  R10.84 - Generalized abdominal pain


 Urinary tract infection


 Qualified Codes:  N30.00 - Acute cystitis without hematuria


Disposition:   HOME, SELF-CARE


Condition:  Stable





Departure-Patient Inst.


Decision time for Depature:  13:22


Referrals:  


[facility]/ (PCP)


Primary Care Physician








ANIKET MAYFIELD (Family)


Primary Care Physician


Patient Instructions:  Acute Abdomen (Belly Pain), Adult (DC)





Add. Discharge Instructions:  


1. Return to ER for any concerns


2. Follow-up with her doctor within 1 week


3. All discharge instructions reviewed with patient and/or family. Voiced 

understanding.


Scripts


Prochlorperazine Maleate (Compazine) 10 Mg Tablet


10 MG PO Q6H PRN for NAUSEA/VOMITING-1ST LINE, #14 TAB


   Prov: ROYER BENSON APRN         19 


Cefuroxime Axetil (Cefuroxime) 250 Mg Tablet


250 MG PO BID, #10 TAB


   Prov: ROYER BENSON APRN         19











ROYER BENSON APRN 2019 11:36

## 2019-01-30 NOTE — XMS REPORT
[facility]

 Created on: 2018



Libra Galvez

External Reference #: [id number]

: 1965

Sex: Female



Demographics







 Address  [address]  [address]

 

 Preferred Language  Unknown

 

 Marital Status  Unknown

 

 Episcopal Affiliation  Unknown

 

 Race  Unknown

 

 Ethnic Group  Unknown





Author







 Author  MAURO TAM  [facility]

 

 Address  Unknown

 

 Phone  [phone]







Care Team Providers







 Care Team Member Name  Role  Phone

 

 MAURO TAM  Unavailable  [phone]







PROBLEMS







 Type  Condition  ICD9-CM Code  XMW18-NG Code  Onset Dates  Condition Status  
SNOMED Code

 

 Problem  Anxiety disorder, unspecified     F41.9     Active  082883181

 

 Problem  Violation of controlled substance agreement     Z91.14     Active  
[id number]

 

 Problem  Intestinal malabsorption, unspecified     K90.9     Active  50435404

 

 Problem  Acquired hypothyroidism     E03.9     Active  512474416

 

 Problem  Diabetes     E11.9     Active  290015603

 

 Problem  Hypertriglyceridemia     E78.1     Active  983700505

 

 Problem  Status post cholecystectomy     Z90.49     Active  551968637







ALLERGIES







 Substance  Reaction  Event Type  Date  Status

 

 Sulfamethoxazole-Trimethoprim  anaphylaxis  Drug Allergy    Active







ENCOUNTERS







 Encounter  Location  Date  Diagnosis

 

 [facility]  [address] [address]-
6384  01 Oct, 2018   

 

 [facility]  [address] [address]-
1896  29 Aug, 2018  Yeast infection B37.9

 

 [facility]  [address] [address]-
6635  21 Aug, 2018   

 

 [facility]  [address] [address]-
7529    Intestinal malabsorption, unspecified K90.9 ; Diarrhea, 
unspecified R19.7 ; Diabetes E11.9 and Marijuana smoker F12.90

 

 [facility]  [id number] [place] [address] 
[id number]    Dental examination Z01.20

 

 [facility]  [id number] [place] [address] 
[id number]  10 Jul, 2018  Dental examination Z01.20 and Dental caries K02.9

 

 [facility]  [address] [address]-
7253    Benzodiazepine use agreement exists Z02.89 ; Therapeutic 
drug monitoring Z51.81 and Violation of controlled substance agreement Z91.14

 

 [place] [address] [address]-
8115  05 2018   

 

 [place] [address] [address]-
5407    Ketoacidosis E87.2 ; Status post cholecystectomy Z90.49 ; 
Diabetes E11.9 ; Acquired hypothyroidism E03.9 ; Hypertriglyceridemia E78.1 and 
Vaginal yeast infection B37.3

 

 [place] [address] [address]-
8442  15 May, 2018   

 

 [place] [address] [address]-
5355     

 

 [place] [address] [address]-
8935  20 Mar, 2018   

 

 [facility]  [address] [address]-
2062     

 

 [place] [address] [address]-
8620     

 

 [place] [address] [address]-
2189  10 Andrei, 2018  Diabetes E11.9 and Drug-induced acute pancreatitis with 
uninfected necrosis K85.31

 

 [place] [address] [address]-
6120  27 Dec, 2017   

 

 [facility]  [address] [address]-
1039     

 

 [facility]  [address] [address]
-[phone]  10 Nov, 2017  Crushing injury of right foot, initial encounter S97.81XA

 

 [place] [address] [address]-
3637  27 Sep, 2017   

 

 [facility]  [address] [address]-
2317  21 Sep, 2017   

 

 [facility]  [address], [place] [address]
-3132  19 Sep, 2017  Acute non-recurrent pansinusitis J01.40

 

 [facility]  [address] [address]-
2522  19 Sep, 2017   

 

 [facility]  [id number] [place] [address]-
5376  04 Aug, 2017   

 

 [facility]  [id number] [place] [address]-
7441     

 

 [facility]  [id number] [place] [address]-
1196  26 May, 2017   

 

 [facility]  [id number] [place] [address]-
6050  08 May, 2017  Diabetes E11.9 ; Anxiety disorder, unspecified F41.9 and 
Acquired hypothyroidism E03.9

 

 [facility]  [address] [address]-
9620  01 May, 2017   

 

 [facility]  [id number] [place] [address]-
1347     

 

 [facility]  [address] [address]-
0028     

 

 [facility]  [address] [place] [address]-
6267  06 Mar, 2017   

 

 [facility]  [address]65[address] [address]-
7040     

 

 [facility]  [id number] [place] [address]-
8069     

 

 [facility]  [address] [address]-
5757     

 

 [facility]  [address] [address]-
0758     

 

 [facility]  [id number] [place] [address]-
8324    Acute non-recurrent maxillary sinusitis J01.00

 

 [facility]  [address] [place] [address]-
9945     

 

 [facility]  [id number] [place] [address]-
6872     

 

 [facility]  [id number] [place] [address]-
2754  12 Dec, 2016   

 

 [facility]  [id number] [place] [address] 6[address]-
7555  15 Nov, 2016   

 

 [facility]  [id number] [place] [address] 9[address]55-
7720  12 Oct, 2016  Diabetes E11.9

 

 [facility]  [id number] [place] [address]-
5467  28 Sep, 2016  Pelvic pain R10.2 ; Leukocytosis, unspecified D72.829 ; 
Constipation, unspecified constipation type K59.00 and History of pancreatitis 
Z87.19

 

 [facility]  [address]65[address] [address]-
8996  22 Sep, 2016   

 

 [facility]  [id number] [place] [address] 7[address]-
6265  14 Sep, 2016  Diabetes E11.9

 

 [facility]  [address]65[address] [address]-
8812  13 Sep, 2016   

 

 [facility]  [address] [place] [address] 7[address]-
3650  09 Sep, 2016  Vaginal yeast infection B37.3

 

 [facility]  [address] [place] [address] 97814-
3608  08 Sep, 2016   

 

 [facility]  [id number] [place] [address]97-
7411  30 Aug, 2016  Diabetes E11.9

 

 [facility]  [address]65[address] [address]-
2126  25 Aug, 2016  Anxiety disorder, unspecified F41.9

 

 [facility]  [address] [place] [address] 421881-
7986  17 Aug, 2016  Vaginal yeast infection B37.3

 

 [facility]  [address] [place] [address]-
2955  17 Aug, 2016   

 

 [facility]  [address] [place] [address]-
2938    Anxiety disorder, unspecified F41.9

 

 [facility]  [id number] [place] [address] 92611-
0789    Vaginal yeast infection B37.3

 

 [facility]  [address] [place] [address] 85976-
9950    Anxiety disorder, unspecified F41.9

 

 [facility]  [id number] [place] [address] 90944-
1716    Anxiety disorder, unspecified F41.9

 

 [facility]  [address] [place] [address] 26128-
6102  06 May, 2016  Anxiety disorder, unspecified F41.9

 

 [facility]  [address]65[address] [address]-
9948  04 May, 2016  Diabetes E11.9

 

 [facility]  [address] [place] [address] 97258-
2632    Anxiety disorder, unspecified F41.9

 

 [facility]  [address] [place] [address] 87590-
8579     

 

 [facility]  [address] [place] [address] 209898-
5802  25 Mar, 2016  Vaginal yeast infection B37.3

 

 [facility]  [address]65[address] [address]-
5018  15 Mar, 2016   

 

 [facility]  [address] [address]-
3907     

 

 [facility]  [id number] [place] [address] 332182-
9120     

 

 [facility]  [address] [place] [address] 98350-
7927     

 

 [facility]  [id number] [place] [address] 94004-
2545  15 Andrei, 2016   

 

 [facility]  [id number] [place] [address] 477176-
8307  22 Dec, 2015   

 

 [facility]  [id number] [place] [address] 749699-
6835    Diabetes E11.9 ; Acquired hypothyroidism E03.9 ; 
Hyperlipidemia, unspecified hyperlipidemia E78.5 and Anxiety F41.9

 

 [facility]  [id number] [place] [address] 14886-
7828     

 

 [facility]  [id number] [place] [address]85-
2366  27 Oct, 2015   

 

 [facility]  [id number] [place] [address] [id number]-
2511  29 Sep, 2015   

 

 [facility]  [address] [place] [address] 54[address]-
5255  01 Sep, 2015   

 

 [facility]  [address] [place] [address] 57637-
4271  04 Aug, 2015   

 

 [facility]  [address] [place] [address] 81990-
8087  15 Jul, 2015   

 

 [facility]  [id number] [place] [address] 21207-
9467    DUB (dysfunctional uterine bleeding) 626.8 and Pap test, as 
part of routine gynecological examination V76.2

 

 [facility]  [id number] [place] [address] 78233-
1741     

 

 [facility]  [address] [id number] [place] [address]-
1139     

 

 [facility]  [address] [id number] [place] [address]-
0239     

 

 [facility]  [address] [id number] [place] [address]-
0381     

 

 [facility]  [address] [id number] [place] [address]-
4221  15 Eagle, 2015  Diabetes 250.00

 

 [facility]  [address] [id number] [place] [address]-
8072     

 

 [facility]  [address] [id number] [place] [address]-
0196  19 May, 2015   

 

 [facility]  [address] [id number] [place] [address]-
0045  13 May, 2015  Diabetes 250.00

 

 [facility]  [address] [id number] [place] [address]-
3533  12 May, 2015   

 

 [facility]  [address] [id number] [place] [address]-
6862  07 May, 2015   

 

 [facility]  [address] [id number] [place] [address]-
9636  07 May, 2015   

 

 [facility]  [address] [id number] [place] [address]-
9186  05 May, 2015   

 

 [facility]  [address] [id number] [address]-
7766  01 May, 2015   

 

 [facility]  [address] [id number] [place] [address]-
9068     

 

 [facility]  [address] [id number] [place] [address]-
4174     

 

 [facility]  [address] [id number] [place] [address]-
5768     

 

 [facility]  [address] [id number] [place] [address]-
8345  17 Mar,    

 

 [facility] [place] [facility]  [address] [id number] [place] [address]-
4842  17 Mar,    

 

 [facility] [place] [facility]  [address] [id number] [place] [address]-
7386  17 Mar,    

 

 [facility] [place] [facility]  [address] [id number] [place] [address]-
3565  17 Mar,    

 

 [facility] [place] [facility]  [address] [id number] [place] [address]-
3334  09 Mar,    

 

 [facility] [place] [facility]  [address] [id number] [place] [address]-
6626  09 Mar,    

 

 [facility] [place] [facility]  [address] [id number] [place] [address]-
6556  06 Mar,    

 

 [facility] [place] [facility]  [address] [id number] [place] [address]-
8244  03 Mar,    

 

 [facility] [place] [facility]  [address] [id number] [place] [address]-
6318  03 Mar,    

 

 [facility] [place] [facility]  [address] [id number] [place] [address]-
9052  ,    

 

 [facility] [place] [facility]  [address] [facility] [id number] [place] [address]-
4047  ,    

 

 [facility] [place] [facility]  [address] [id number] [place] [address]-
1796  ,    

 

 [facility] [place] [facility]  [address] [facility] [id number] [place] [address]-
2546  ,    

 

 [facility] [place] [facility]  [address] [id number] [place] [address]-
5296  ,    

 

 [facility] [place] [facility]  [address] [facility] [id number] [place] [address]-
2546  ,    

 

 [facility] [place] [facility]  [address] [facility] [id number] [place] [address]-
2546     

 

 [facility] [place] [facility]  [address] [id number] [place] [address]-
1751     

 

 [facility] [place] [facility]  [address] [id number] [place] [address]-
3346     

 

 [facility] [place] [facility]  [address] [id number] [place] [address]-
9061     

 

 [facility] [place] [facility]  [address] [id number] [place] [address]-
6977     

 

 [facility] [place] [facility]  [address] [id number] [place] [address]-
2441     

 

 [facility] [place] [facility]  [address] [id number] [place] [address]-
9065     

 

 [facility] [place] [facility]  [address] [id number] [place] [address]-
0929     

 

 [facility] [place] [facility]  [address] [id number] [place] [address]-
3249     

 

 [facility] [place] [facility]  [address] [id number] [place] [address]-
4781     

 

 [facility] [place] [facility]  [address] [id number] [place] [address]-
3742     

 

 [facility] [place] [facility]  [address] [id number][place] [address]-
6709     

 

 [facility] [place] [facility]  [address] [id number][place] [address]-
8438     

 

 [facility] [place] [facility]  [address] [id number] [place] [address]-
2615     

 

 [facility] [place] [facility]  [address] [id number] [place] [address]-
2576  23 Dec, 2014   

 

 [facility] [place] [facility]  [address] [id number] [place] [address]-
1971  23 Dec, 2014   

 

 [facility] [place] [facility]  [address] [id number] [place] [address]-
8159  22 Dec, 2014   

 

 [facility] [place] [facility]  [address] [id number] [place] [address]-
3647  22 Dec, 2014   

 

 [facility] [place] [facility]  [address] [id number] [place]-
3456  17 Dec, 2014   

 

 [facility] [place] [facility]  [address] [id number] [place]
3846  17 Dec, 2014   

 

 [facility] [place] [facility]  [address] [id number] [place] [address]-
5583  15 Dec, 2014   

 

 [facility] [place] [facility]  [address] [id number] [place] [address]-
5173  15 Dec, 2014   

 

 [facility] [place] [facility]  [address] [id number] [place] [address]-
7710  12 Dec, 2014   

 

 [facility] [place] [facility]  [address] [id number] [place] [address]-
8030  08 Dec, 2014   

 

 [facility] [place] [facility]  [address] [id number] [place] [address]-
4557  08 Dec, 2014   

 

 [facility] [place] [facility]  [address] [id number] [place] [address]-
0332  08 Dec, 2014   

 

 [facility] [place] [facility]  [address] [facility] [id number] [place] [address]-
8755  08 Dec, 2014   

 

 [facility] [place] [facility]  [address] [id number] [place] [address]-
0270     

 

 [facility] [place] [facility]  [address] [id number] [place] [address]-
1252     

 

 [facility] [place] [facility]  [address] [id number] [place] [address]-
2951     

 

 [facility] [place] [facility]  [address] [id number] [place] [address]-
0559  13 2014   

 

 [facility] [place] [facility]  [address] [id number] [place]
8467  10 Nov, 2014   

 

 [facility] [place] [facility]  [address] [id number] [place] [address]-
3199  10 Nov, 2014   

 

 [facility] [place] [facility]  [address] [id number] [place] [address]-
8046  07 Oct, 2014   

 

 [facility] [place] [facility]  [address] [id number] [place]
9977  07 Oct, 2014   

 

 [facility] [place] [facility]  [address] [id number] [place] [address]-
6221  01 Oct, 2014   

 

 [facility] [place] [facility]  [address] [id number] [place] [address]-
5366  01 Oct, 2014   

 

 [facility] [place] [facility]  [address] [id number] [place] [address]-
3281  24 Sep, 2014   

 

 [facility] [place] [facility]  [address] [id number] [place] [address]-
7568  24 Sep, 2014   

 

 [facility] [place] [facility]  [address] [id number] [place] [address]-
3109  23 Sep, 2014   

 

 [facility] [place] [facility]  [address] [id number] [place] [address]-
9683  23 Sep, 2014   

 

 [facility] [place] [facility]  [address] [id number] [place] [address]-
9538  22 Sep, 2014   

 

 [facility] [place] [facility]  [address] [id number] [place] [address]-
0629  22 Sep, 2014   

 

 [facility] [place] [facility]  [address] [id number] [place] [address]-
2755  19 Aug, 2014   

 

 [facility] [place] [facility]  [address] [id number] [place] [address]-
8085  19 Aug, 2014   

 

 [facility] [place] [facility]  [address] [id number] [place] [address]-
4171     

 

 [facility] [place] [facility]  [address] [id number] [place] [address]-
6126     

 

 [facility] [place] [facility]  [address] [id number] [place] [address]-
5226  10 Eagle, 2014   

 

 [facility] [place] [facility]  [address] [id number] [place] [address]-
0796     

 

 [facility] [place] [facility]  [address] [id number] [place]-
1443     

 

 [facility] [place] [facility]  [address] [id number] [place] [address]-
4431  07 May, 2014   

 

 [facility] [place] [facility]  [address] [id number] [place] [address]-
8038  07 May, 2014   

 

 [facility] [place] [facility]  [address] [id number] [place] [address]-
3206     

 

 [facility] [place] [facility]  [address] [id number] [place] [address]-
0774     

 

 [facility] [place] [facility]  [address] [id number] [place] [address]-
0321     

 

 [facility] [place] [facility]  [address] [id number] [place] [address]-
0702     

 

 [facility] [place] [facility]  [address] [id number] [place] [address]-
9745     

 

 [facility] [place] [facility]  [address] [id number] [place] [address]-
1107     

 

 [facility] [place] [facility]  [address] [id number] [place] [address]-
9762     

 

 [facility] [place] [facility]  [address] [id number] [place] [address]-
4310     

 

 [facility] [place] [facility]  [address] [id number] [place] [address]-
8944     

 

 [facility] [place] [facility]  [address] [id number] [place] [address]-
4971     

 

 [facility] [place] [facility]  [address] [id number] [place] [address]-
5413  17 Mar, 2014   

 

 [facility] [place] [facility]  [address] [id number] [place] [address]-
2546  17 Mar, 2014   

 

 [facility]  [address] [id number] [place] [address]-
4316     

 

 [facility] [place] [facility]  [address] [id number] [place] [address]-
2546     

 

 [facility]  [address] [id number] [place] [address]-
2546     

 

 [facility] [place] [facility]  [address] [id number] [place] [address]-
2546     

 

 [facility] [place] [facility]  [address] [id number] [place] [address]-
8816  25 Oct, 2013   

 

 [facility] [place] [facility]  [address] [id number] [place] [address]-
8776  25 Oct, 2013   

 

 [facility] [place] [facility]  [address] [id number] [place] [address]-
2546  23 Sep, 2013   

 

 [facility]  [address] [id number] [place] [address]-
2546  06 Aug, 2013   

 

 [facility] [place] [facility]  [address] [id number] [place] [address]-
2546  05 Aug, 2013   

 

 [facility]  [address] [id number] [place] [address]-
2546     

 

 [facility] [place] [facility]  [address] [id number] [place] [address]-
2546     

 

 [facility] [place] [facility]  [address] [id number] [place] [address]-
2546     

 

 [facility] [place] [facility]  [address] [id number] [place] [address]-
2546  28 Dec, 2012   

 

 [facility] [place] [facility]  [address] [id number] [place] [address]-
2546  19 Dec, 2012   

 

 [facility] [place] [facility]  [address] [id number] [place] [address]-
1245  19 Dec, 2012   

 

 [facility] [place] [facility]  [address] [id number] [place] [address]-
5177  13 Dec, 2012   

 

 [facility] [place] [facility]  [address] [id number] [place] [address]-
6896  13 Dec, 2012   

 

 [facility] [place] [facility]  [address] [id number] [place] [address]
[address]     

 

 [facility] [place] [facility]  [address] [id number] [place] [address]-
9691     

 

 [facility] [place] [facility]  [address] [id number] [place] [address]-
3578  19 Oct, 2012   

 

 [facility] [place] [facility]  [address] [id number] [place] [address]-
4771  18 Oct, 2012   

 

 [facility] [place] [facility]  [address] [id number] [place] [address]-
2744  17 Oct, 2012   

 

 [facility] [place] [facility]  [address] [id number] [place] [address]-
9614  17 Oct, 2012   

 

 [facility] [place] [facility]  [address] [id number] [place] [address]-
7487  16 Oct, 2012   

 

 [facility] [place] [facility]  [address] [facility] [id number], [place] [address]-
7072  16 Oct, 2012   

 

 [facility] [place] [facility]  [address] [id number][place] [address]-
8131  15 Oct, 2012   

 

 [facility] [place] [facility]  [address] [id number][place] [address]-
0669  27 Sep, 2012   

 

 [facility] [place] [facility]  [address] [id number] [place] [address]-
2681  26 Sep, 2012   

 

 [facility] [place] [facility]  [address] [id number], [place] [address]-
3588     

 

 [facility] [place] [facility]  [address] [facility] [id number] [place] [address]-
9302     

 

 [facility] [place] [facility]  [address] [id number] [place] [address]-
8098     

 

 [facility] [place] [facility]  [address] [id number] [place] [address]-
6162     

 

 [facility] [place] [facility]  [address] [id number] [place] [address]-
9716     

 

 [facility] [place] [facility]  [address] [id number] [place] [address]-
5816     

 

 [facility] [place] [facility]  [address] [id number] [place] [address]-
4723     

 

 [facility] [place] [facility]  [address] [id number] [place] [address]-
4745     

 

 [facility] [place] [facility]  [address] [id number] [place] [address]-
9156  01 Mar, 2012   

 

 [facility] [place] [facility]  [address] [id number] [place] [address]-
8633     

 

 [facility] [place] [facility]  [address] [id number] [place] [address]-
[address]     

 

 [facility] [place] [facility]  [address] [id number] [place] [address]-
2644     

 

 [facility] [place] [facility]  [address] [id number] [place] [address]-
9972  10 Andrei, 2012   

 

 [facility] [place] [facility]  [address] [id number] [place] [address]-
8405     

 

 [facility] [place] [facility]  [address] [id number] [place] [address]-
0462  20 Dec, 2011   

 

 [facility] [place] [facility]  [address] [id number] [place] [address]-
9450  20 Dec, 2011   

 

 [facility] [place] [facility]  [address] [id number] [place] [address]-
7356  13 Dec, 2011   

 

 [facility] [place] [facility]  [address] [id number] [place] [address]-
5513  08 Dec, 2011   

 

 [facility]  [id number] [address]-
0190  08 Dec, 2011   

 

 [facility]  [id number] [place] [address] [phone]
[phone]  06 Dec, 2011   

 

 [facility]  [id number] [place] [phone]
[phone]  10 Oct, 2011   

 

 [facility]  [id number] [place] [phone]
[phone]  10 Oct, 2011   

 

 [facility]  [id number] [place] [phone]
0856     

 

 [facility]  [id number] [place] [phone]
[phone]  20 Dec, 2010   

 

 [facility]  [id number] [place] [phone]
[phone]  20 Dec, 2010   

 

 [facility]  [id number] [place] [phone]
[phone]     

 

 [facility]  [id number] [place] [phone]
[phone]     

 

 [facility]  [id number] [place] [phone]
[phone]  15 Oct, 2009   

 

 [facility]  [id number] [place] [phone]
[phone]  15 Oct, 2009   

 

 [facility]  [id number] [place] [phone]
[phone]     







IMMUNIZATIONS

No Known Immunizations



SOCIAL HISTORY

Never Assessed



REASON FOR VISIT

Pieces of tooth/pain after TE



PLAN OF CARE







 Activity  Details









  









 Follow Up  prn Reason:hygiene







VITAL SIGNS





MEDICATIONS







 Medication  Instructions  Dosage  Frequency  Start Date  End Date  Duration  
Status

 

 Amaryl 2 MG     TAKE TWO TABLETS BY MOUTH TWICE DAILY           90 days  Active

 

 MetFORMIN HCl  mg     TAKE TWO TABLETS BY MOUTH TWICE DAILY WITH MEALS.  
MUST BE TEVA BRAND           30 days  Active

 

 Glucocard Expression Monitor w/Device     as directed     08 May, 2017        
Active

 

 Potassium 99 MG  Orally Once a day  1 tablet  24h           Active

 

 Cinnamon 500 MG  Orally 2 times a day  2 capsules  12h           Active

 

 Ibuprofen 800 MG  Orally Once a day  1 tablet  24h           Active







RESULTS

No Results



PROCEDURES







 Procedure  Date Ordered  Result  Body Site

 

 INTRAORL-PERIAPICAL 1 FILM [id number]  2018      

 

 TX COMPS - UNUSUL CIRCUMSTANCES RPT  2018      







INSTRUCTIONS





MEDICATIONS ADMINISTERED

No Known Medications



MEDICAL (GENERAL) HISTORY







 Type  Description  Date

 

 Medical History  hypothyroidism   

 

 Medical History  type II diabetes   

 

 Medical History  back pain   

 

 Medical History  hyperlipidemia   

 

 Medical History  anxiety   

 

 Medical History  mood disorder   

 

 Medical History  heart murmur   

 

 Medical History  chronic bronchitis   

 

 Medical History  [place]   

 

 Surgical History  appendectomy   

 

 Surgical History  gallbladder removal   

 

 Hospitalization History  childbirth   

 

 Hospitalization History  pancreatitis  2005

 

 Hospitalization History  appendectomy   

 

 Hospitalization History  Anaphylaxis to Bactrim  2016

## 2022-04-03 NOTE — DIAGNOSTIC IMAGING REPORT
EXAM: Abdomen, supine and erect.



INDICATION: Nausea, vomiting



COMPARISON: There are no prior studies available for comparison.



FINDINGS:  

There is some gas in both the large and small bowel in a

nonspecific fashion. There is no evidence for a bowel

obstruction. There is no mass, organomegaly or pathological

calcification evident. There does appear to be degenerative disc

and bony disease at L4-5 and L5-S1. There is no fracture or acute

bony abnormality evident, however.



IMPRESSION:

The bowel gas pattern is nonspecific. There is no acute

abnormality evident. 



Dictated by: 



  Dictated on workstation # [id number] [phone]

## 2023-08-19 ENCOUNTER — HOSPITAL ENCOUNTER (EMERGENCY)
Dept: HOSPITAL 75 - ER | Age: 58
LOS: 1 days | Discharge: HOME | End: 2023-08-20
Payer: SELF-PAY

## 2023-08-19 VITALS — SYSTOLIC BLOOD PRESSURE: 110 MMHG | DIASTOLIC BLOOD PRESSURE: 72 MMHG

## 2023-08-19 VITALS — WEIGHT: 145.06 LBS | BODY MASS INDEX: 26.36 KG/M2 | HEIGHT: 62.2 IN
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
## 2023-08-19 DIAGNOSIS — S80.12XA: Primary | ICD-10-CM

## 2023-08-19 PROCEDURE — 73590 X-RAY EXAM OF LOWER LEG: CPT

## 2023-08-20 NOTE — DIAGNOSTIC IMAGING REPORT
INDICATION: Left lower leg pain.



COMPARISON: None.



DISCUSSION: Two views of left lower leg were obtained. Anterior

soft tissue swelling noted along the left shin. No soft tissue

gas or foreign body. No fracture or dislocation. Joint spaces are

maintained.



IMPRESSION:

1. Anterior left shin soft tissue swelling. No fracture.



Dictated by: 



  Dictated on workstation # [id number]

## 2023-08-20 NOTE — ED LOWER EXTREMITY
General


Chief Complaint:  Lower Extremity


Stated Complaint:  LEFT SHIN INJURY ,SWELLING


Nursing Triage Note:  


PT TO ROOM BY WHEELCHAIR. STATES SHE WAS TOSSING AROUND A STACK OF EMPTY 5 


GALLON BUCKETS AND A STACK HIT HER IN THE SHIN. PT STATES LOWER LEFT LEG INTO 


LEFT ANKLE HAS PAIN AND HAS HAD TROUBLE WALKING ON IT. PT IS A&OX4, SPEECH 


NORMAL ON ARRIVAL


Source:  patient


Exam Limitations:  no limitations





History of Present Illness


Date Seen by Provider:  Aug 20, 2023


Time Seen by Provider:  23:55





Allergies and Home Medications


Allergies


Coded Allergies:  


     sulfamethoxazole (Verified  Allergy, Intermediate, HIVES, 9/18/16)


     trimethoprim (Verified  Allergy, Intermediate, HIVES, 9/18/16)





Patient Home Medication List


Alprazolam (Alprazolam) 1 Mg Tablet, (Reported)


   Entered as Reported by: JASS BERRY on 5/26/18 0507


Cefuroxime Axetil (Cefuroxime) 250 Mg Tablet, 250 MG PO BID


   Prescribed by: ROYER BENSON on 1/30/19 1323


Glimepiride (Glimepiride) 2 Mg Tablet, (Reported)


   Entered as Reported by: JASS BERRY on 5/26/18 0507


Hydrocodone Bit/Acetaminophen (Lortab  5 Mg Tablet) 1 Tab Tab, 1 TAB PO Q8H PRN


   Prescribed by: REYNOLD MCNALLY on 5/26/18 1214


Metformin HCl (Metformin HCl ER) 500 Mg Tab.er.24h, (Reported)


   Entered as Reported by: JASS BERRY on 5/26/18 0507


Prednisone (Prednisone) 10 Mg Tab, 10 MG PO BID


   Prescribed by: JOSE ALFARO on 9/19/16 0149


Prochlorperazine Maleate (Compazine) 10 Mg Tablet, 10 MG PO Q6H PRN for SERGIO

SEA/VOMITING-1ST LINE


   Prescribed by: ROYER BENSON on 1/30/19 1323





Past Medical-Social-Family Hx


Patient Social History


Tobacco Use?:  No


Use of E-Cig and/or Vaping dev:  No


Substance use?:  No


Alcohol Use?:  No





Immunizations Up To Date


Tetanus Booster (TDap):  Unknown


PED Vaccines UTD:  Yes


Influenza Vaccine Up-to-Date:  Yes; Up-to-Date





Seasonal Allergies


Seasonal Allergies:  No





Past Medical History


Surgeries:  Yes


Appendectomy, Gallbladder


Respiratory:  No


Cardiac:  No


Neurological:  No


Reproductive Disorders:  No


Genitourinary:  No


Gastrointestinal:  Yes (choly 5/26/)


Pancreatitis


Musculoskeletal:  No


Endocrine:  Yes


Diabetes, Non-Insulin dep


HEENT:  No


Cancer:  No


Psychosocial:  Yes


Anxiety


Integumentary:  No


Blood Disorders:  No





Family Medical History


Cancer, Diabetes, Hypertension





Physical Exam


Vital Signs





Vital Signs - First Documented








 8/19/23





 23:49


 


Temp 36.4


 


Pulse 104


 


Resp 20


 


B/P (MAP) 110/72 (85)


 


Pulse Ox 95





Capillary Refill :


Height, Weight, BMI


Height: 5'2.00"


Weight: 135lbs. 0.0oz. 61.839106ro; 26.00 BMI


Method:Stated





Progress/Results/Core Measures


Results/Orders


My Orders





Orders - JOSE DAY MD


Tibia/Fibula, Left, 2 Views (8/20/23 00:01)


Ondansetron  Oral Dissolve Tab (Zofran (8/20/23 00:45)


Tramadol Tablet (Ultram Tablet) (8/20/23 00:45)





Vital Signs/I&O











 8/19/23





 23:49


 


Temp 36.4


 


Pulse 104


 


Resp 20


 


B/P (MAP) 110/72 (85)


 


Pulse Ox 95














Blood Pressure Mean:                    85











Departure


Impression





   Primary Impression:  


   Contusion of skin


Disposition:  01 HOME, SELF-CARE


Condition:  Stable





Departure-Patient Inst.


Decision time for Depature:  00:35


Referrals:  


[facility] (PCP)


Primary Care Physician








MARKELL ALVAREZ DO (Family)


Primary Care Physician


Patient Instructions:  Contusion (DC), HEMATOMA





Add. Discharge Instructions:  


You have a contusion (deep bruise) of your shin and possibly a hematoma 

(collection of blood under the surface).


Use Tylenol (acetaminophen) up to 1000 mg every 6 hours as needed for primary 

pain control.  Add Ultram (tramadol) as prescribed for pain not controlled by 

Tylenol.  Ultram may cause drowsiness, so use with caution.  Do not drive, 

operate machinery, or make important decisions on Ultram.  Ultram may also cause

constipation, so you may wish to use a stool softener such as Colace while 

taking it.


Icing in 20-minute intervals and elevating should reduce pain and swelling.  

When elevating, try to elevate your shin to the level of your heart.  Keep your 

foot level with or above your knee when elevating.


Expect significant discoloration over the next 1 to 2 weeks as blood products 

spread through the tissues of your leg.


Return to care if you have worsening symptoms despite following these 

instructions.





All discharge instructions reviewed with patient and/or family. Voiced 

understanding.


Scripts


Ondansetron (Ondansetron Odt) 4 Mg Tab.rapdis


4 MG SL Q4H PRN for NAUSEA/VOMITING, #10 TAB


   For nausea associated with Ultram


   Prov: JOSE DAY MD         8/20/23 


Tramadol HCl (Tramadol HCl) 50 Mg Tablet


50 MG PO Q6H PRN for PAIN BREAKTROUGH, #10 TAB


   Prov: JOSE DAY MD         8/20/23











JOSE DAY MD        Aug 20, 2023 00:41

## 2024-09-11 NOTE — ED ABDOMINAL PAIN
General


Stated Complaint:  PANCREATITIS ATTACK,VOMITING,POSS FEVER


Source of Information:  Patient, Other


Exam Limitations:  No Limitations


 (LUDWIG MITCHELL)





History of Present Illness


Date Seen by Provider:  May 26, 2018


Time Seen by Provider:  04:25


Initial Comments


The patient presents to the ER by private conveyance with a chief complaint she 

is having epigastric abdominal pain consistent with her history of 

pancreatitis. Her epigastric pain radiates to her back. It's sharp, constant. 

She has poorly controlled diabetes. She does not take her blood sugars anymore 

because stresses are out. She had an A1c about for 5 months ago of 10. She has 

a history of hypertriglyceridemia above 1200 several years ago when she had a 

pancreatitis attack. She does not take fish oil her statins due to adverse 

effects. She denies alcohol intake. She does not use insulin for her diabetes 

just Amaryl, metformin. She says she's been taking her medications as 

prescribed. She denies any trauma, dysuria, chills but she thinks she might of 

been having some fever the last several days. The pain started last night about 

2130 after eating dinner. She says the last time she had pancreatitis she had 

to be put in the hospital for 8 days on Dilaudid.


 (LUDWIG MITCHELL)





Allergies and Home Medications


Allergies


Coded Allergies:  


     sulfamethoxazole (Verified  Allergy, Intermediate, HIVES, 16)


     trimethoprim (Verified  Allergy, Intermediate, HIVES, 16)





Home Medications


Prednisone 10 Mg Tab, 10 MG PO BID


   Prescribed by: JOSE ALFARO on 16 0149





Patient Home Medication List


Home Medication List Reviewed:  Yes


 (LUDWIG MITCHELL)





Review of Systems


Constitutional:  No chills, No diaphoresis


EENTM:  No Blurred Vision, No Double Vision


Respiratory:  Denies Cough, Denies Shortness of Air


Cardiovascular:  Denies Chest Pain, Denies Edema


Gastrointestinal:  See HPI; Denies Abdomen Distended; Abdominal Pain; Denies 

Constipated, Denies Diarrhea; Nausea, Poor Appetite, Poor Fluid Intake, Vomiting


Genitourinary:  Denies Burning, Denies Discharge, Denies Drainage


Musculoskeletal:  No back pain, No joint pain


Skin:  No pruritus, No rash


Psychiatric/Neurological:  Denies Headache, Denies Numbness, Denies Paresthesia 

(LUDWIG MITCHELL)





Past Medical-Social-Family Hx


Patient Social History


Alcohol Use:  Denies Use


Recreational Drug Use:  No


Smoking Status:  Never a Smoker


Recent Foreign Travel:  No


Contact w/Someone Who Travel:  No


Recent Hopitalizations:  No


 (LUDWIG MITCHELL)





Seasonal Allergies


Seasonal Allergies:  No


 (LUDWIG MITCHELL)





Past Medical History


Appendectomy


Reproductive Disorders:  No


Pancreatitis


Diabetes, Non-Insulin dep


Anxiety


 (LUDWIG MITCHELL)





Physical Exam


Vital Signs





Vital Signs - First Documented








 18





 04:20


 


Temp 97.7


 


Pulse 117


 


Resp 20


 


B/P (MAP) 149/100 (116)


 


Pulse Ox 98


 


O2 Delivery Room Air





 (JOSE DAY MD)


Vital Signs


Capillary Refill :  


 (LUDWIG MITCHELL)


General Appearance:  WD/WN, mild distress


HEENT:  PERRL/EOMI, normal ENT inspection, TMs normal; No pharynx normal (

oropharynx is dry)


Neck:  non-tender, full range of motion, supple


Respiratory:  chest non-tender, lungs clear, normal breath sounds, no 

respiratory distress, no accessory muscle use


Cardiovascular:  normal peripheral pulses, regular rate, rhythm, no edema


Peripheral Pulses:  2+ Radial Pulses (R), 2+ Radial Pulses (L)


Gastrointestinal:  normal bowel sounds, no organomegaly, guarding (epigastric), 

tenderness


Extremities:  normal inspection, no pedal edema, normal capillary refill


Neurologic/Psychiatric:  alert, oriented x 3


Skin:  normal color, warm/dry (LUDWIG MITCHELL)





Progress/Results/Core Measures


Results/Orders


Lab Results





Laboratory Tests








Test


 18


04:30 18


04:34 18


05:10 18


06:27 Range/Units


 


 


White Blood Count


 5.1 


 


 


 


 4.3-11.0


10^3/uL


 


Red Blood Count


 4.93 


 


 


 


 4.35-5.85


10^6/uL


 


Hemoglobin 14.2     11.5-16.0  G/DL


 


Hematocrit 40     35-52  %


 


Mean Corpuscular Volume 82     80-99  FL


 


Mean Corpuscular Hemoglobin 29     25-34  PG


 


Mean Corpuscular Hemoglobin


Concent 35 


 


 


 


 32-36  G/DL





 


Red Cell Distribution Width 13.4     10.0-14.5  %


 


Platelet Count


 131 


 


 


 


 130-400


10^3/uL


 


Mean Platelet Volume 10.1     7.4-10.4  FL


 


Neutrophils (%) (Auto) 90 H    42-75  %


 


Lymphocytes (%) (Auto) 7 L    12-44  %


 


Monocytes (%) (Auto) 3     0-12  %


 


Eosinophils (%) (Auto) 0     0-10  %


 


Basophils (%) (Auto) 0     0-10  %


 


Neutrophils # (Auto) 4.6     1.8-7.8  X 10^3


 


Lymphocytes # (Auto) 0.4 L    1.0-4.0  X 10^3


 


Monocytes # (Auto) 0.2     0.0-1.0  X 10^3


 


Eosinophils # (Auto)


 0.0 


 


 


 


 0.0-0.3


10^3/uL


 


Basophils # (Auto)


 0.0 


 


 


 


 0.0-0.1


10^3/uL


 


Neutrophils % (Manual) 80      %


 


Lymphocytes % (Manual) 6      %


 


Monocytes % (Manual) 2      %


 


Eosinophils % (Manual) 0      %


 


Basophils % (Manual) 0      %


 


Band Neutrophils 12      %


 


Blood Morphology Comment NORMAL      


 


Sodium Level 136     135-145  MMOL/L


 


Potassium Level 4.0     3.6-5.0  MMOL/L


 


Chloride Level 102       MMOL/L


 


Carbon Dioxide Level 18 L    21-32  MMOL/L


 


Anion Gap 16 H    5-14  MMOL/L


 


Blood Urea Nitrogen 16     7-18  MG/DL


 


Creatinine


 0.82 


 


 


 


 0.60-1.30


MG/DL


 


Estimat Glomerular Filtration


Rate > 60 


 


 


 


  





 


BUN/Creatinine Ratio 20      


 


Glucose Level 325 H      MG/DL


 


Calcium Level 9.8     8.5-10.1  MG/DL


 


Magnesium Level 1.9     1.8-2.4  MG/DL


 


Total Bilirubin 1.2 H    0.1-1.0  MG/DL


 


Aspartate Amino Transf


(AST/SGOT) 25 


 


 


 


 5-34  U/L





 


Alanine Aminotransferase


(ALT/SGPT) 33 


 


 


 


 0-55  U/L





 


Alkaline Phosphatase 88       U/L


 


C-Reactive Protein High


Sensitivity 1.45 H


 


 


 


 0.00-0.50


MG/DL


 


Total Protein 8.6 H    6.4-8.2  GM/DL


 


Albumin 4.7 H    3.2-4.5  GM/DL


 


Triglycerides Level 315 H    <150  MG/DL


 


Lipase 61     8-78  U/L


 


Glucometer  308 H  276 H   MG/DL


 


Urine Color   YELLOW    


 


Urine Clarity   CLEAR    


 


Urine pH   6   5-9  


 


Urine Specific Gravity   1.010 L  1.016-1.022  


 


Urine Protein   1+ H  NEGATIVE  


 


Urine Glucose (UA)   4+ H  NEGATIVE  


 


Urine Ketones   3+ H  NEGATIVE  


 


Urine Nitrite   NEGATIVE   NEGATIVE  


 


Urine Bilirubin   NEGATIVE   NEGATIVE  


 


Urine Urobilinogen   NORMAL   NORMAL  MG/DL


 


Urine Leukocyte Esterase   NEGATIVE   NEGATIVE  


 


Urine RBC (Auto)   NEGATIVE   NEGATIVE  


 


Urine RBC   RARE    /HPF


 


Urine WBC   NONE    /HPF


 


Urine Squamous Epithelial


Cells 


 


 2-5 


 


  /HPF





 


Urine Crystals   NONE    /LPF


 


Urine Bacteria   TRACE    /HPF


 


Urine Casts   NONE    /LPF


 


Urine Mucus   NEGATIVE    /LPF


 


Urine Culture Indicated   NO    


 


Urine Opiates Screen   NEGATIVE   NEGATIVE  


 


Urine Oxycodone Screen   NEGATIVE   NEGATIVE  


 


Urine Methadone Screen   NEGATIVE   NEGATIVE  


 


Urine Propoxyphene Screen   NEGATIVE   NEGATIVE  


 


Urine Barbiturates Screen   NEGATIVE   NEGATIVE  


 


Ur Tricyclic Antidepressants


Screen 


 


 NEGATIVE 


 


 NEGATIVE  





 


Urine Phencyclidine Screen   NEGATIVE   NEGATIVE  


 


Urine Amphetamines Screen   NEGATIVE   NEGATIVE  


 


Urine Methamphetamines Screen   NEGATIVE   NEGATIVE  


 


Urine Benzodiazepines Screen   POSITIVE H  NEGATIVE  


 


Urine Cocaine Screen   NEGATIVE   NEGATIVE  


 


Urine Cannabinoids Screen   POSITIVE H  NEGATIVE  


 


Test


 18


06:38 


 


 


 Range/Units


 


 


Sodium Level 138     135-145  MMOL/L


 


Potassium Level 3.7     3.6-5.0  MMOL/L


 


Chloride Level 107       MMOL/L


 


Carbon Dioxide Level 18 L    21-32  MMOL/L


 


Anion Gap 13     5-14  MMOL/L


 


Blood Urea Nitrogen 14     7-18  MG/DL


 


Creatinine


 0.71 


 


 


 


 0.60-1.30


MG/DL


 


Estimat Glomerular Filtration


Rate > 60 


 


 


 


  





 


BUN/Creatinine Ratio 20      


 


Glucose Level 275 H      MG/DL


 


Calcium Level 8.2 L    8.5-10.1  MG/DL





 (JOSE DAY MD)


My Orders





Orders - JOSE DAY MD


Accucheck Stat ONCE (18 06:23)


Abdomen, Flat & Upright/Decub (18 06:23)


Us Gallbladder 43035 (18 06:23)


Basic Metabolic Panel (18 06:33)


Promethazine Injection (Phenergan Injec (18 07:30)


Metoclopramide Injection (Reglan Injecti (18 09:00)


Consult Family Medicine (18 09:18)


Accucheck Stat ONCE (18 09:32)


 (JOSE DAY MD)


Medications Given in ED





Current Medications








 Medications  Dose


 Ordered  Sig/Olivia


 Route  Start Time


 Stop Time Status Last Admin


Dose Admin


 


 Fentanyl Citrate  50 mcg  ONCE  ONCE


 IVP  18 04:30


 18 04:33 DC 18 04:38


50 MCG


 


 Fentanyl Citrate  50 mcg  ONCE  ONCE


 IVP  18 05:45


 18 05:46 DC 18 06:37


50 MCG


 


 Metoclopramide HCl  5 mg  ONCE  ONCE


 IVP  18 09:00


 18 09:01 DC 18 08:59


5 MG


 


 Ondansetron HCl  4 mg  ONCE  ONCE


 IVP  18 04:30


 18 04:33 DC 18 04:38


4 MG


 


 Ondansetron HCl  4 mg  ONCE  ONCE


 IVP  18 05:45


 18 05:46 DC 18 06:23


4 MG


 


 Promethazine HCl  25 mg  ONCE  ONCE


 IVP  18 07:30


 18 07:31 DC 18 07:32


25 MG





 (JOSE DAY MD)


Vital Signs/I&O











 18





 04:20 06:37


 


Temp 97.7 97.7


 


Pulse 117 


 


Resp 20 


 


B/P (MAP) 149/100 (116) 


 


Pulse Ox 98 


 


O2 Delivery Room Air 





 (JOSE DAY MD)





Progress


Progress Note #1:  


   Time:  04:36


Progress Note


The patient is requesting that we treat her pancreatitis here in the ER and if 

possible she would like to do outpatient therapy. We will start with 2 L of 

saline, 50 g of fentanyl, 4 mg Zofran and check her lipase, triglycerides, CBC

, CMP. Her vitals are okay except for tachycardia. She has significant elevated 

inflammatory markers or white cell count we would consider scanning looking for 

abscess formation around the pancreas.


Progress Note #2:  


   Time:  05:36


Progress Note


Urine is positive for ketones, she has hyperglycemia, elevated gap and 

metabolic acidosis. She is in DKA. We started her already on 2 L of fluids. Her 

pain and nausea still not under control. We will give her some more fentanyl 

and Zofran.


Gabby Ventura is on ICU diversion until late this morning or early afternoon at 

the earliest. We will look for placement nearby for ICU management of her DKA.


 (LUDWIG MITCHELL)


Progress Note #1:  


   Time:  06:28


Progress Note


Care of this patient was assumed from Dr. Mitchell at shift change.  She has been 

reexamined.  She received 2 L of IV fluids and a dose of Zofran.  Fentanyl has 

been given for pain.  On reassessment, patient seems to be a bit bloated in the 

upper abdomen.  She has mild upper abdominal tenderness and flinches with 

palpation of the right upper quadrant.  She began retching and dry heaving 

during my exam.  Case was discussed with Dr. Mitchell.  Labs were reviewed.  A 

repeat fingerstick blood sugar was 276.  Plan at present was to transfer to 

[facility].  However, EMS transport is not immediately available.  Due to 

the pain and recurrent heaving, I will perform some imaging including a KUB and 

upright x-ray and a gallbladder ultrasound.  A BMP will also be re-checked.  

Patient reported having some diarrhea over the past 3 days but her last bowel 

movement within the last 24 hours was normal.


Progress Note #2:  


   Time:  07:30


Progress Note


Despite repeat dose of Zofran, patient is retching again.  A dose of Phenergan 

is being administered.  Abdominal x-ray demonstrated no evidence for bowel 

obstruction.  Bowel gas pattern was nonspecific.  Gallbladder ultrasound is 

pending.


Progress Note #3:  


   Time:  08:35


Progress Note


Patient is feeling better now after Phenergan.  Gallbladder ultrasound revealed 

a thickened gallbladder wall with pericholecystic fluid suspicious for acute 

cholecystitis.  Case was reviewed with Dr. Smith he will present to the 

emergency room to assess the patient.  Surgery is tentatively anticipated.  

Vital signs are stable at this time and patient remains afebrile.  Tachycardia 

resolved.  Pain is controlled after multiple doses of fentanyl.  Given the 

overall picture and progression of her workup, I do not believe at this time 

patient has DKA.  I believe she likely has a metabolic acidosis related to 

vomiting and diarrhea secondary to acute cholecystitis.  Transfer to [facility] will be canceled as we now have bed availability.


Progress Note #4:  


   Time:  09:04


Progress Note


Patient began retching again.  Reglan was ordered.


Progress Note #5:  


   Time:  09:17


Progress Note


Dry heaving and retching is now controlled after Reglan.  Dr. Smith is in the 

room to assess the patient.


 (JOSE DAY MD)





Diagnostic Imaging





   Diagonstic Imaging:  Xray


   Plain Films/CT/US/NM/MRI:  abdomen, pelvis


Comments


KUB and upright x-ray viewed by me.  Report not yet available.  There is a 

nonspecific bowel gas pattern with no evidence of obstruction or free air.  

Stomach is decompressed.








   Diagonstic Imaging:  Ultrasound


   Plain Films/CT/US/NM/MRI:  abdomen


Comments


Gallbladder ultrasound was discussed with technician and report reviewed.  See 

report below:





NAME:   VERO PAYNE


[employer] REC#:   [id number]


ACCOUNT#:   [id number]


PT STATUS:   REG ER


:   1965


PHYSICIAN:   JOSE DAY MD


ADMIT DATE:   18/ER


***Draft***


Date of Exam:18





US GALLBLADDER [id number]








PROCEDURE: US Gallbladder.





TECHNIQUE: Multiple real-time grayscale images were obtained over


the right upper quadrant in various projections.





INDICATION: Abdominal pain, pancreatitis.





COMPARISON: There are no prior studies available for comparison.





FINDINGS:  


There is no evidence for cholelithiasis but the gallbladder wall


is markedly thickened and irregular. The wall measures


approximately 10 MM (normal 3 mm or less).  





There may be a trace amount of pericholecystic fluid present, as


well. These findings do suggest acute cholecystitis. If further


imaging is desired, then a nuclear medicine hepatobiliary scan


would be recommended.





The common bile duct was not visualized due to bowel gas. The


pancreas was also obscured by bowel gas.





The liver does not appear enlarged. There is no focal mass


involving the liver and the biliary tree is not abnormally


distended. 





The right kidney is unremarkable. 





The aorta was not well visualized. 





IMPRESSION:


1. The gallbladder wall is thickened and has a lobulated


appearance. There is also a trace amount of pericholecystic fluid


present. There is no evidence for cholelithiasis but the


appearance of the gallbladder does suggest acute cholecystitis.


Recommendations as above.





2. There is no acute abnormality of the right upper quadrant


noted otherwise.





  Dictated on workstation # [id number]








Dict:   [id number]


Trans:   18 0817


[facility] [id number]





Interpreted by:     JAYLEEN BATRES MD


 (JOSE DAY MD)


Transfer of Care Time:  06:03


Care transferred to:  Annalisa


 (LUDWIG MITCHELL)





Departure


Communication (Admissions)


Time/Spoke to Admitting Phy:  08:02


Dr. Smith


Time/Spoke to Consulting Phy:  09:09


Dr. Gomez


 (JOSE DAY MD)





Impression





 Primary Impression:  


 Acute cholecystitis


 Additional Impressions:  


 Metabolic acidosis


 Nausea vomiting and diarrhea


 Hyperglycemia


 Type II diabetes mellitus


 Qualified Codes:  E11.9 - Type 2 diabetes mellitus without complications


Disposition:   ADMITTED AS INPATIENT


Condition:  Improved





Admissions


Decision to Admit Reason:  Admit from ER (General)


Decision to Admit/Date:  May 26, 2018


Time/Decision to Admit Time:  08:02


 (JOSE DAY MD)


Transfer


Time Spoke to Accepting Phy:  05:50


Transfer Progress Notes


[facility]


Dr Flores, 


Transfer Facility:  


[place], [place].


Method of Transfer:  EMS


 (LUDWIG MITCHELL)





Departure-Patient Inst.


Referrals:  


[facility] (PCP)


Primary Care Physician








ANIKET MAYFIELD (Family)


Primary Care Physician











LUDWIG MITCHELL May 26, 2018 04:37


JOSE DAY MD May 26, 2018 06:31 Male